# Patient Record
Sex: MALE | Race: WHITE | NOT HISPANIC OR LATINO | Employment: OTHER | URBAN - METROPOLITAN AREA
[De-identification: names, ages, dates, MRNs, and addresses within clinical notes are randomized per-mention and may not be internally consistent; named-entity substitution may affect disease eponyms.]

---

## 2018-05-21 ENCOUNTER — OFFICE VISIT CONVERTED (OUTPATIENT)
Dept: CARDIOLOGY | Facility: CLINIC | Age: 73
End: 2018-05-21
Attending: INTERNAL MEDICINE

## 2018-05-21 ENCOUNTER — CONVERSION ENCOUNTER (OUTPATIENT)
Dept: CARDIOLOGY | Facility: CLINIC | Age: 73
End: 2018-05-21

## 2018-10-04 ENCOUNTER — OFFICE VISIT CONVERTED (OUTPATIENT)
Dept: UROLOGY | Facility: CLINIC | Age: 73
End: 2018-10-04
Attending: UROLOGY

## 2018-11-26 ENCOUNTER — OFFICE VISIT CONVERTED (OUTPATIENT)
Dept: CARDIOLOGY | Facility: CLINIC | Age: 73
End: 2018-11-26
Attending: INTERNAL MEDICINE

## 2018-11-26 ENCOUNTER — CONVERSION ENCOUNTER (OUTPATIENT)
Dept: OTHER | Facility: HOSPITAL | Age: 73
End: 2018-11-26

## 2019-04-09 ENCOUNTER — HOSPITAL ENCOUNTER (OUTPATIENT)
Dept: SURGERY | Facility: HOSPITAL | Age: 74
Setting detail: HOSPITAL OUTPATIENT SURGERY
Discharge: HOME | End: 2019-04-09
Attending: UROLOGY

## 2019-06-01 ENCOUNTER — HOSPITAL ENCOUNTER (OUTPATIENT)
Dept: OTHER | Facility: HOSPITAL | Age: 74
Discharge: HOME | End: 2019-06-01
Attending: INTERNAL MEDICINE

## 2019-06-01 LAB
ALBUMIN SERPL-MCNC: 4.3 G/DL (ref 3.5–5)
ALBUMIN/GLOB SERPL: 1.5 {RATIO} (ref 1.4–2.6)
ALP SERPL-CCNC: 79 U/L (ref 56–155)
ALT SERPL-CCNC: 18 U/L (ref 10–40)
ANION GAP SERPL CALC-SCNC: 17 MMOL/L (ref 8–19)
AST SERPL-CCNC: 15 U/L (ref 15–50)
BILIRUB SERPL-MCNC: 0.81 MG/DL (ref 0.2–1.3)
BUN SERPL-MCNC: 18 MG/DL (ref 5–25)
BUN/CREAT SERPL: 16 {RATIO} (ref 6–20)
CALCIUM SERPL-MCNC: 9.2 MG/DL (ref 8.7–10.4)
CHLORIDE SERPL-SCNC: 105 MMOL/L (ref 99–111)
CHOLEST SERPL-MCNC: 136 MG/DL (ref 107–200)
CHOLEST/HDLC SERPL: 4.1 {RATIO} (ref 3–6)
CONV CO2: 27 MMOL/L (ref 22–32)
CONV TOTAL PROTEIN: 7.1 G/DL (ref 6.3–8.2)
CREAT UR-MCNC: 1.1 MG/DL (ref 0.7–1.2)
GFR SERPLBLD BASED ON 1.73 SQ M-ARVRAT: >60 ML/MIN/{1.73_M2}
GLOBULIN UR ELPH-MCNC: 2.8 G/DL (ref 2–3.5)
GLUCOSE SERPL-MCNC: 107 MG/DL (ref 70–99)
HDLC SERPL-MCNC: 33 MG/DL (ref 40–60)
LDLC SERPL CALC-MCNC: 82 MG/DL (ref 70–100)
OSMOLALITY SERPL CALC.SUM OF ELEC: 302 MOSM/KG (ref 273–304)
POTASSIUM SERPL-SCNC: 4.4 MMOL/L (ref 3.5–5.3)
SODIUM SERPL-SCNC: 145 MMOL/L (ref 135–147)
TRIGL SERPL-MCNC: 104 MG/DL (ref 40–150)
VLDLC SERPL-MCNC: 21 MG/DL (ref 5–37)

## 2019-06-18 ENCOUNTER — OFFICE VISIT CONVERTED (OUTPATIENT)
Dept: CARDIOLOGY | Facility: CLINIC | Age: 74
End: 2019-06-18
Attending: NURSE PRACTITIONER

## 2019-08-09 ENCOUNTER — HOSPITAL ENCOUNTER (OUTPATIENT)
Dept: OTHER | Facility: HOSPITAL | Age: 74
Discharge: HOME | End: 2019-08-09
Attending: DERMATOLOGY

## 2019-08-09 LAB
ALBUMIN SERPL-MCNC: 4.3 G/DL (ref 3.5–5)
ALP SERPL-CCNC: 79 U/L (ref 56–155)
ALT SERPL-CCNC: 17 U/L (ref 10–40)
AST SERPL-CCNC: 15 U/L (ref 15–50)
BASOPHILS # BLD AUTO: 0.07 10*3/UL (ref 0–0.2)
BASOPHILS NFR BLD AUTO: 1.2 % (ref 0–3)
BILIRUB SERPL-MCNC: 0.47 MG/DL (ref 0.2–1.3)
CONV ABS IMM GRAN: 0.01 10*3/UL (ref 0–0.2)
CONV BILI, CONJUGATED: <0.2 MG/DL (ref 0–0.6)
CONV IMMATURE GRAN: 0.2 % (ref 0–1.8)
CONV TOTAL PROTEIN: 6.7 G/DL (ref 6.3–8.2)
CONV UNCONJUGATED BILIRUBIN: 0.3 MG/DL (ref 0–1.1)
DEPRECATED RDW RBC AUTO: 41.2 FL (ref 35.1–43.9)
EOSINOPHIL # BLD AUTO: 0.25 10*3/UL (ref 0–0.7)
EOSINOPHIL # BLD AUTO: 4.4 % (ref 0–7)
ERYTHROCYTE [DISTWIDTH] IN BLOOD BY AUTOMATED COUNT: 13.2 % (ref 11.6–14.4)
HCT VFR BLD AUTO: 41.6 % (ref 42–52)
HGB BLD-MCNC: 13.9 G/DL (ref 14–18)
LYMPHOCYTES # BLD AUTO: 1.6 10*3/UL (ref 1–5)
LYMPHOCYTES NFR BLD AUTO: 28.1 % (ref 20–45)
MCH RBC QN AUTO: 28.9 PG (ref 27–31)
MCHC RBC AUTO-ENTMCNC: 33.4 G/DL (ref 33–37)
MCV RBC AUTO: 86.5 FL (ref 80–96)
MONOCYTES # BLD AUTO: 0.78 10*3/UL (ref 0.2–1.2)
MONOCYTES NFR BLD AUTO: 13.7 % (ref 3–10)
NEUTROPHILS # BLD AUTO: 2.98 10*3/UL (ref 2–8)
NEUTROPHILS NFR BLD AUTO: 52.4 % (ref 30–85)
NRBC CBCN: 0 % (ref 0–0.7)
PLATELET # BLD AUTO: 267 10*3/UL (ref 130–400)
PMV BLD AUTO: 9.9 FL (ref 9.4–12.4)
RBC # BLD AUTO: 4.81 10*6/UL (ref 4.7–6.1)
WBC # BLD AUTO: 5.69 10*3/UL (ref 4.8–10.8)

## 2019-08-14 LAB
CONV QUANTIFERON TB GOLD: NEGATIVE
QUANTIFERON CRITERIA: NORMAL
QUANTIFERON MITOGEN VALUE: >10 IU/ML
QUANTIFERON NIL VALUE: 0.03 IU/ML
QUANTIFERON TB1 AG VALUE: 0.03 IU/ML
QUANTIFERON TB2 AG VALUE: 0.02 IU/ML

## 2019-09-17 ENCOUNTER — HOSPITAL ENCOUNTER (OUTPATIENT)
Dept: OTHER | Facility: HOSPITAL | Age: 74
Discharge: HOME | End: 2019-09-17
Attending: NURSE PRACTITIONER

## 2019-09-17 LAB
ALBUMIN SERPL-MCNC: 4.5 G/DL (ref 3.5–5)
ALBUMIN/GLOB SERPL: 1.7 {RATIO} (ref 1.4–2.6)
ALP SERPL-CCNC: 90 U/L (ref 56–155)
ALT SERPL-CCNC: 39 U/L (ref 10–40)
ANION GAP SERPL CALC-SCNC: 15 MMOL/L (ref 8–19)
AST SERPL-CCNC: 27 U/L (ref 15–50)
BILIRUB SERPL-MCNC: 0.74 MG/DL (ref 0.2–1.3)
BUN SERPL-MCNC: 14 MG/DL (ref 5–25)
BUN/CREAT SERPL: 15 {RATIO} (ref 6–20)
CALCIUM SERPL-MCNC: 9.4 MG/DL (ref 8.7–10.4)
CHLORIDE SERPL-SCNC: 104 MMOL/L (ref 99–111)
CHOLEST SERPL-MCNC: 132 MG/DL (ref 107–200)
CHOLEST/HDLC SERPL: 3.8 {RATIO} (ref 3–6)
CONV CO2: 29 MMOL/L (ref 22–32)
CONV TOTAL PROTEIN: 7.1 G/DL (ref 6.3–8.2)
CREAT UR-MCNC: 0.94 MG/DL (ref 0.7–1.2)
GFR SERPLBLD BASED ON 1.73 SQ M-ARVRAT: >60 ML/MIN/{1.73_M2}
GLOBULIN UR ELPH-MCNC: 2.6 G/DL (ref 2–3.5)
GLUCOSE SERPL-MCNC: 102 MG/DL (ref 70–99)
HDLC SERPL-MCNC: 35 MG/DL (ref 40–60)
LDLC SERPL CALC-MCNC: 77 MG/DL (ref 70–100)
OSMOLALITY SERPL CALC.SUM OF ELEC: 299 MOSM/KG (ref 273–304)
POTASSIUM SERPL-SCNC: 3.9 MMOL/L (ref 3.5–5.3)
SODIUM SERPL-SCNC: 144 MMOL/L (ref 135–147)
TRIGL SERPL-MCNC: 99 MG/DL (ref 40–150)
VLDLC SERPL-MCNC: 20 MG/DL (ref 5–37)

## 2019-10-17 ENCOUNTER — OFFICE VISIT CONVERTED (OUTPATIENT)
Dept: UROLOGY | Facility: CLINIC | Age: 74
End: 2019-10-17
Attending: UROLOGY

## 2019-10-17 ENCOUNTER — CONVERSION ENCOUNTER (OUTPATIENT)
Dept: UROLOGY | Facility: CLINIC | Age: 74
End: 2019-10-17

## 2019-12-09 ENCOUNTER — HOSPITAL ENCOUNTER (OUTPATIENT)
Dept: OTHER | Facility: HOSPITAL | Age: 74
Discharge: HOME | End: 2019-12-09
Attending: NURSE PRACTITIONER

## 2019-12-09 LAB
ALBUMIN SERPL-MCNC: 4.3 G/DL (ref 3.5–5)
ALBUMIN/GLOB SERPL: 1.3 {RATIO} (ref 1.4–2.6)
ALP SERPL-CCNC: 102 U/L (ref 56–155)
ALT SERPL-CCNC: 25 U/L (ref 10–40)
ANION GAP SERPL CALC-SCNC: 20 MMOL/L (ref 8–19)
AST SERPL-CCNC: 17 U/L (ref 15–50)
BILIRUB SERPL-MCNC: 0.7 MG/DL (ref 0.2–1.3)
BUN SERPL-MCNC: 22 MG/DL (ref 5–25)
BUN/CREAT SERPL: 22 {RATIO} (ref 6–20)
CALCIUM SERPL-MCNC: 9.9 MG/DL (ref 8.7–10.4)
CHLORIDE SERPL-SCNC: 103 MMOL/L (ref 99–111)
CHOLEST SERPL-MCNC: 136 MG/DL (ref 107–200)
CHOLEST/HDLC SERPL: 3.7 {RATIO} (ref 3–6)
CONV CO2: 25 MMOL/L (ref 22–32)
CONV TOTAL PROTEIN: 7.5 G/DL (ref 6.3–8.2)
CREAT UR-MCNC: 1.02 MG/DL (ref 0.7–1.2)
GFR SERPLBLD BASED ON 1.73 SQ M-ARVRAT: >60 ML/MIN/{1.73_M2}
GLOBULIN UR ELPH-MCNC: 3.2 G/DL (ref 2–3.5)
GLUCOSE SERPL-MCNC: 104 MG/DL (ref 70–99)
HDLC SERPL-MCNC: 37 MG/DL (ref 40–60)
LDLC SERPL CALC-MCNC: 77 MG/DL (ref 70–100)
OSMOLALITY SERPL CALC.SUM OF ELEC: 302 MOSM/KG (ref 273–304)
POTASSIUM SERPL-SCNC: 3.7 MMOL/L (ref 3.5–5.3)
SODIUM SERPL-SCNC: 144 MMOL/L (ref 135–147)
TRIGL SERPL-MCNC: 112 MG/DL (ref 40–150)
VLDLC SERPL-MCNC: 22 MG/DL (ref 5–37)

## 2019-12-17 ENCOUNTER — OFFICE VISIT CONVERTED (OUTPATIENT)
Dept: CARDIOLOGY | Facility: CLINIC | Age: 74
End: 2019-12-17
Attending: NURSE PRACTITIONER

## 2020-06-09 ENCOUNTER — HOSPITAL ENCOUNTER (OUTPATIENT)
Dept: OTHER | Facility: HOSPITAL | Age: 75
Discharge: HOME | End: 2020-06-09
Attending: NURSE PRACTITIONER

## 2020-06-09 LAB
ALBUMIN SERPL-MCNC: 4.3 G/DL (ref 3.5–5)
ALBUMIN/GLOB SERPL: 1.6 {RATIO} (ref 1.4–2.6)
ALP SERPL-CCNC: 80 U/L (ref 56–155)
ALT SERPL-CCNC: 22 U/L (ref 10–40)
ANION GAP SERPL CALC-SCNC: 16 MMOL/L (ref 8–19)
AST SERPL-CCNC: 15 U/L (ref 15–50)
BILIRUB SERPL-MCNC: 0.66 MG/DL (ref 0.2–1.3)
BUN SERPL-MCNC: 22 MG/DL (ref 5–25)
BUN/CREAT SERPL: 22 {RATIO} (ref 6–20)
CALCIUM SERPL-MCNC: 9.1 MG/DL (ref 8.7–10.4)
CHLORIDE SERPL-SCNC: 103 MMOL/L (ref 99–111)
CHOLEST SERPL-MCNC: 121 MG/DL (ref 107–200)
CHOLEST/HDLC SERPL: 3.9 {RATIO} (ref 3–6)
CK SERPL-CCNC: 60 U/L (ref 57–374)
CONV CO2: 24 MMOL/L (ref 22–32)
CONV TOTAL PROTEIN: 7 G/DL (ref 6.3–8.2)
CREAT UR-MCNC: 1.01 MG/DL (ref 0.7–1.2)
GFR SERPLBLD BASED ON 1.73 SQ M-ARVRAT: >60 ML/MIN/{1.73_M2}
GLOBULIN UR ELPH-MCNC: 2.7 G/DL (ref 2–3.5)
GLUCOSE SERPL-MCNC: 112 MG/DL (ref 70–99)
HDLC SERPL-MCNC: 31 MG/DL (ref 40–60)
LDLC SERPL CALC-MCNC: 69 MG/DL (ref 70–100)
OSMOLALITY SERPL CALC.SUM OF ELEC: 292 MOSM/KG (ref 273–304)
POTASSIUM SERPL-SCNC: 4 MMOL/L (ref 3.5–5.3)
SODIUM SERPL-SCNC: 139 MMOL/L (ref 135–147)
TRIGL SERPL-MCNC: 105 MG/DL (ref 40–150)
VLDLC SERPL-MCNC: 21 MG/DL (ref 5–37)

## 2020-06-16 ENCOUNTER — OFFICE VISIT CONVERTED (OUTPATIENT)
Dept: CARDIOLOGY | Facility: CLINIC | Age: 75
End: 2020-06-16
Attending: NURSE PRACTITIONER

## 2020-07-16 ENCOUNTER — OFFICE VISIT CONVERTED (OUTPATIENT)
Dept: UROLOGY | Facility: CLINIC | Age: 75
End: 2020-07-16
Attending: UROLOGY

## 2020-10-07 ENCOUNTER — HOSPITAL ENCOUNTER (OUTPATIENT)
Dept: OTHER | Facility: HOSPITAL | Age: 75
Discharge: HOME | End: 2020-10-07
Attending: NURSE PRACTITIONER

## 2020-10-07 LAB
ALBUMIN SERPL-MCNC: 4.2 G/DL (ref 3.5–5)
ALBUMIN/GLOB SERPL: 1.5 {RATIO} (ref 1.4–2.6)
ALP SERPL-CCNC: 79 U/L (ref 56–155)
ALT SERPL-CCNC: 22 U/L (ref 10–40)
ANION GAP SERPL CALC-SCNC: 20 MMOL/L (ref 8–19)
AST SERPL-CCNC: 20 U/L (ref 15–50)
BASOPHILS # BLD AUTO: 0.1 10*3/UL (ref 0–0.2)
BASOPHILS NFR BLD AUTO: 1.6 % (ref 0–3)
BILIRUB SERPL-MCNC: 0.42 MG/DL (ref 0.2–1.3)
BILIRUB SERPL-MCNC: 0.45 MG/DL (ref 0.2–1.3)
BUN SERPL-MCNC: 20 MG/DL (ref 5–25)
BUN/CREAT SERPL: 18 {RATIO} (ref 6–20)
CALCIUM SERPL-MCNC: 9.2 MG/DL (ref 8.7–10.4)
CHLORIDE SERPL-SCNC: 103 MMOL/L (ref 99–111)
CHOLEST SERPL-MCNC: 126 MG/DL (ref 107–200)
CHOLEST/HDLC SERPL: 3.9 {RATIO} (ref 3–6)
CONV ABS IMM GRAN: 0.02 10*3/UL (ref 0–0.2)
CONV BILI, CONJUGATED: <0.2 MG/DL (ref 0–0.6)
CONV CO2: 23 MMOL/L (ref 22–32)
CONV IMMATURE GRAN: 0.3 % (ref 0–1.8)
CONV TOTAL PROTEIN: 7 G/DL (ref 6.3–8.2)
CONV UNCONJUGATED BILIRUBIN: 0.3 MG/DL (ref 0–1.1)
CREAT UR-MCNC: 1.09 MG/DL (ref 0.7–1.2)
DEPRECATED RDW RBC AUTO: 43.3 FL (ref 35.1–43.9)
EOSINOPHIL # BLD AUTO: 0.37 10*3/UL (ref 0–0.7)
EOSINOPHIL # BLD AUTO: 5.7 % (ref 0–7)
ERYTHROCYTE [DISTWIDTH] IN BLOOD BY AUTOMATED COUNT: 13.2 % (ref 11.6–14.4)
GFR SERPLBLD BASED ON 1.73 SQ M-ARVRAT: >60 ML/MIN/{1.73_M2}
GLOBULIN UR ELPH-MCNC: 2.8 G/DL (ref 2–3.5)
GLUCOSE SERPL-MCNC: 110 MG/DL (ref 70–99)
HCT VFR BLD AUTO: 43.1 % (ref 42–52)
HDLC SERPL-MCNC: 32 MG/DL (ref 40–60)
HGB BLD-MCNC: 13.8 G/DL (ref 14–18)
LDLC SERPL CALC-MCNC: 70 MG/DL (ref 70–100)
LYMPHOCYTES # BLD AUTO: 1.72 10*3/UL (ref 1–5)
LYMPHOCYTES NFR BLD AUTO: 26.7 % (ref 20–45)
MCH RBC QN AUTO: 28.8 PG (ref 27–31)
MCHC RBC AUTO-ENTMCNC: 32 G/DL (ref 33–37)
MCV RBC AUTO: 89.8 FL (ref 80–96)
MONOCYTES # BLD AUTO: 0.86 10*3/UL (ref 0.2–1.2)
MONOCYTES NFR BLD AUTO: 13.4 % (ref 3–10)
NEUTROPHILS # BLD AUTO: 3.37 10*3/UL (ref 2–8)
NEUTROPHILS NFR BLD AUTO: 52.3 % (ref 30–85)
NRBC CBCN: 0 % (ref 0–0.7)
OSMOLALITY SERPL CALC.SUM OF ELEC: 297 MOSM/KG (ref 273–304)
PLATELET # BLD AUTO: 297 10*3/UL (ref 130–400)
PMV BLD AUTO: 10.3 FL (ref 9.4–12.4)
POTASSIUM SERPL-SCNC: 4.2 MMOL/L (ref 3.5–5.3)
RBC # BLD AUTO: 4.8 10*6/UL (ref 4.7–6.1)
SODIUM SERPL-SCNC: 142 MMOL/L (ref 135–147)
TRIGL SERPL-MCNC: 120 MG/DL (ref 40–150)
VLDLC SERPL-MCNC: 24 MG/DL (ref 5–37)
WBC # BLD AUTO: 6.44 10*3/UL (ref 4.8–10.8)

## 2020-10-10 LAB
CONV QUANTIFERON TB GOLD: NEGATIVE
QUANTIFERON CRITERIA: NORMAL
QUANTIFERON MITOGEN VALUE: >10 IU/ML
QUANTIFERON NIL VALUE: 0.03 IU/ML
QUANTIFERON TB1 AG VALUE: 0.03 IU/ML
QUANTIFERON TB2 AG VALUE: 0.03 IU/ML

## 2021-01-12 ENCOUNTER — CONVERSION ENCOUNTER (OUTPATIENT)
Dept: CARDIOLOGY | Facility: CLINIC | Age: 76
End: 2021-01-12

## 2021-01-12 ENCOUNTER — OFFICE VISIT CONVERTED (OUTPATIENT)
Dept: CARDIOLOGY | Facility: CLINIC | Age: 76
End: 2021-01-12
Attending: NURSE PRACTITIONER

## 2021-04-17 ENCOUNTER — HOSPITAL ENCOUNTER (OUTPATIENT)
Dept: OTHER | Facility: HOSPITAL | Age: 76
Discharge: HOME | End: 2021-04-17
Attending: NURSE PRACTITIONER

## 2021-04-17 LAB
ALBUMIN SERPL-MCNC: 4.3 G/DL (ref 3.5–5)
ALBUMIN/GLOB SERPL: 1.4 {RATIO} (ref 1.4–2.6)
ALP SERPL-CCNC: 80 U/L (ref 56–155)
ALT SERPL-CCNC: 22 U/L (ref 10–40)
ANION GAP SERPL CALC-SCNC: 12 MMOL/L (ref 8–19)
AST SERPL-CCNC: 19 U/L (ref 15–50)
BILIRUB SERPL-MCNC: 0.54 MG/DL (ref 0.2–1.3)
BUN SERPL-MCNC: 19 MG/DL (ref 5–25)
BUN/CREAT SERPL: 19 {RATIO} (ref 6–20)
CALCIUM SERPL-MCNC: 9.4 MG/DL (ref 8.7–10.4)
CHLORIDE SERPL-SCNC: 104 MMOL/L (ref 99–111)
CHOLEST SERPL-MCNC: 121 MG/DL (ref 107–200)
CHOLEST/HDLC SERPL: 3.9 {RATIO} (ref 3–6)
CONV CO2: 28 MMOL/L (ref 22–32)
CONV TOTAL PROTEIN: 7.3 G/DL (ref 6.3–8.2)
CREAT UR-MCNC: 1.02 MG/DL (ref 0.7–1.2)
GFR SERPLBLD BASED ON 1.73 SQ M-ARVRAT: >60 ML/MIN/{1.73_M2}
GLOBULIN UR ELPH-MCNC: 3 G/DL (ref 2–3.5)
GLUCOSE SERPL-MCNC: 103 MG/DL (ref 70–99)
HDLC SERPL-MCNC: 31 MG/DL (ref 40–60)
LDLC SERPL CALC-MCNC: 62 MG/DL (ref 70–100)
OSMOLALITY SERPL CALC.SUM OF ELEC: 293 MOSM/KG (ref 273–304)
POTASSIUM SERPL-SCNC: 4.2 MMOL/L (ref 3.5–5.3)
SODIUM SERPL-SCNC: 140 MMOL/L (ref 135–147)
TRIGL SERPL-MCNC: 142 MG/DL (ref 40–150)
VLDLC SERPL-MCNC: 28 MG/DL (ref 5–37)

## 2021-05-13 NOTE — PROGRESS NOTES
Progress Note      Patient Name: Deacon Neil   Patient ID: [id number]   Sex: Male   YOB: 1945    Primary Care Provider: Ghazala DIAS   Referring Provider: Ghazala DIAS    Visit Date: June 16, 2020    Provider: ARUN Blue   Location: [facility]   Location Address: [address] [place] [address], [facility]   [place]  [id number]   Location Phone: [phone]          Chief Complaint  · Muscle aches       History Of Present Illness  REFERRING PROVIDER: Ghazala DIAS   Deacon Neil is a 75 year old male who continues to complain of muscle aches described as cramps, particularly in his right leg. He denies any chest pain or pressure. No palpitations, shortness of breath, swelling, dizziness, syncope, PND or orthopnea. He admits he is not getting much exercise, and he has gained 7 pounds since his last visit.   PAST MEDICAL HISTORY: (1) Coronary artery disease. Cardiac catheterization on 06/21/2013 showed 80-85% stenosis of the mid LAD, 70-75% lesion of the diagonal 1 branch, and 40% stenosis of the proximal left circumflex artery. Medical management was advised at that point. (2) Hyperlipidemia. (3) Hypertension. (4) Psoriasis.   FAMILY HISTORY: Positive for hypertension. Negative for diabetes or heart disease.   PSYCHOSOCIAL HISTORY: No history of mood changes or depression. He drinks a moderate amount of alcohol.   CURRENT MEDICATIONS: include Cosentyx every 4 weeks; ciclopirox shampoo; Clobetasol solution; Clobetasol cream; Desonide cream; Ketoconazole shampoo; econazole cream; desloratadine 5 mg daily; OTC guaifenesin p.r.n; acetaminophen p.r.n.; CoQ10; aspirin 81 mg daily; Atorvastatin 40 mg daily; Zetia 10 mg daily; Valsartan//12.5 mg 1/2 tablet in the morning; Metoprolol ER 25 mg daily. The dosage and frequency of the medications were reviewed with the patient.       Review of Systems  · Cardiovascular  o Denies  o :  "palpitations (fast, fluttering, or skipping beats), swelling (feet, ankles, hands), shortness of breath while walking or lying flat, chest pain or angina pectoris   · Respiratory  o Denies  o : chronic or frequent cough, asthma or wheezing      Vitals  Date Time BP Position Site L\R Cuff Size HR RR TEMP (F) WT  HT  BMI kg/m2 BSA m2 O2 Sat HC       06/16/2020 10:22 /70 Sitting    72 - R   224lbs 16oz 6'  2\" 28.89 2.31     06/16/2020 10:22 /66 Sitting                     Physical Examination  · Constitutional  o Appearance  o : Awake, alert, in no acute distress.  · Eyes  o Conjunctivae  o : Conjunctivae normal.  · Ears, Nose, Mouth and Throat  o Oral Cavity  o :   § Oral Mucosa  § : Normal.  · Neck  o Jugular Veins  o : No JVD. Good carotid upstroke. No bruits noted.  · Respiratory  o Respiratory  o : Good respiratory effort. Clear to percussion and auscultation.  · Cardiovascular  o Heart  o : Regular rate and rhythm. No murmur, rubs, or gallops. No jugular venous distention.   o Peripheral Vascular System  o :   § Extremities  § : Warm and well perfused. Distal pulses present. No pitting pedal edema.   · Gastrointestinal  o Abdominal Examination  o : Soft. No tenderness or masses felt. No hepatosplenomegaly. Abdominal aorta is not palpable.     Labs:  Blood sugar 112, , , HDL 31, LDL 69.    Home blood pressure log was reviewed.               Assessment     1.  Myalgias related to Atorvastatin as well as other statins.  2.  Coronary artery disease, without angina.  3.  Hypertension, with white-coat syndrome, with good control at home.       Plan     1.  Instructed him to hold the Atorvastatin for 2 weeks and call to tell me how he is doing.    2.  He is also instructed to do another blood pressure log prior to his next appointment.  3.  Follow up in 6 months with labs or p.r.n.    Sarahy moreno/juarez           This note was transcribed by Taylor Kaur.  juarez/josh  The above service was " transcribed by Taylor Kaur, and I attest to the accuracy of the note.  JF                 Electronically Signed by: Taylor Kaur-, -Author on July 2, 2020 08:16:00 AM  Electronically Co-signed by: ARUN Blue -Reviewer on July 2, 2020 08:50:02 AM

## 2021-05-13 NOTE — PROGRESS NOTES
"   Progress Note      Patient Name: Deacon Neil   Patient ID: [id number]   Sex: Male   YOB: 1945    Primary Care Provider: Ghazala DIAS   Referring Provider: Ghazala DIAS    Visit Date: July 16, 2020    Provider: Crispin Zendejas MD   Location[facility]   Location Address: [address] [place] [address], [address] [place] [address]  [id number]   Location Phone: [phone]          Chief Complaint  · \"Here for a prostate check\"      History Of Present Illness  The patient is a 75 year old /White male , who presents to follow up on bph. Patient was diagnosed with fibroepithelial bladder tumor in 2009. He has been doing very good. No hematuria or pressure in the urinary bladder. His stream is good and no burning           Past Medical History  Bladder tumor; BPH (benign prostatic hyperplasia); Psoriasis         Past Surgical History  Cataract surgery; Circumcision; Cystoscopy; Tonsillectomy; Vasectomy         Medication List  aspirin 81 mg oral tablet,delayed release (DR/EC); clobetasol 0.05 % topical solution; clobetasol 0.05 % topical cream; co-10 100 mg; Cosentyx 150 mg/mL subcutaneous syringe; desloratadine 5 mg oral tablet; desonide 0.05 % topical lotion; econazole 1 % topical cream; ezetimibe 10 mg oral tablet; ketoconazole 2 % topical shampoo; metoprolol succinate 25 mg oral tablet extended release 24 hr; rosuvastatin 10 mg oral tablet; Tylenol Extra Strength 500 mg oral tablet; valsartan-hydrochlorothiazide 320-12.5 mg oral tablet         Allergy List  Pseudoephedrine Cold/Allergy         Family Medical History  Hypertension         Social History  Alcohol (Current some day); Tobacco (Former)         Review of Systems  · Constitutional  o Denies  o : fever, headache, chills  · Eyes  o Denies  o : eye pain, double vision, blurred vision  · HENT  o Denies  o : sinus problems, sore throat, ear infection  · Respiratory  o Admits  o : cough  o Denies  o : " "shortness of breath, wheezing, frequent cough  · Gastrointestinal  o Denies  o : nausea, vomiting, heartburn, indigestion, abdominal pain  · Genitourinary  o Denies  o : urgency, frequency, urinary retention, painful urination  · Integument  o Denies  o : rash, itching, boils  · Neurologic  o Denies  o : tingling or numbness, tremors, dizzy spells  · Musculoskeletal  o Denies  o : joint pain, neck pain, back pain  · Endocrine  o Denies  o : cold intolerance, heat intolerance, tired, excessive thirst, sluggish  · Psychiatric  o Admits  o : feels satisfied with life  o Denies  o : severe depression, concerns with hurting themselves  · Heme-Lymph  o Denies  o : swollen glands, blood clotting problems  · Allergic-Immunologic  o Denies  o : sinus allergy symptoms, hay fever      Vitals  Date Time BP Position Site L\R Cuff Size HR RR TEMP (F) WT  HT  BMI kg/m2 BSA m2 O2 Sat HC       07/16/2020 02:17 /63 Sitting    79 - R  97.3 224lbs 16oz 6'  2\" 28.89 2.31           Physical Examination  · Constitutional  o Appearance  o : Well nourished, well developed patient in no acute distress. Ambulating without difficulty.  · Neck  o Thyroid  o : Normal size without tenderness, nodules or masses  · Respiratory  o Respiratory Effort  o : Breathing is unlabored without accessory muscle use  o Inspection of Chest  o : normal appearance, no retractions  o Auscultation of Lungs  o : Normal breath sounds  · Cardiovascular  o Heart  o :   § Auscultation of Heart  § : regular rate and rhythm, no murmurs, gallops or rubs  o Peripheral Vascular System  o : No abnormalities  · Gastrointestinal  o Abdominal Examination  o : abdomen nontender to palpation, normal bowel sounds, tone normal without rigidity or guarding, no masses present, abdomen obese upon supine  o Liver and spleen  o : No hepatomegaly present. Liver is non-tender to palpation and spleen is not palpable.  o Hernias  o : No abdominal wall hernias are " present.  · Genitourinary  o Bladder  o : no abnormalities  o Penis  o : Normal appearance without lesion, discharge or masses.  o Urethral Meatus  o : no abnormalities  o Scrotum and Scrotal Contents  o :   § Scrotum  § : no abnormalities  § Epididymides  § : no abnormalities  § Testes  § : no abnormalities  o Digital Rectal Examination  o :   § Prostate  § : Not done at patient request  · Lymphatic  o Neck  o : No lymphadenopathy present  o Groin  o : No lymphadenopathy present  · Skin and Subcutaneous Tissue  o General Inspection  o : No rashes, lesions or areas of discoloration present. Skin turgor is normal.  o General Palpation  o : No abnormalities, masses or tenderness on palpation.  · Neurologic  o Mental Status Examination  o : grossly oriented to person, place and time  o Gait and Station  o : normal gait, able to stand without difficulty  · Psychiatric  o Mood and Affect  o : mood normal, affect appropriate      Figure 1.0: Pain Rating Scale-Zap         Results  · In-Office Procedures  o Lab procedure  § Automated dipstick urinalysis with microscopy (78177)   § Color Ur: Yellow   § Clarity Ur: Clear   § Glucose Ur Ql Strip: Negative   § Bilirub Ur Ql Strip: Negative   § Ketones Ur Ql Strip: Negative   § Sp Gr Ur Qn: 1.025   § Hgb Ur Ql Strip: Negative   § pH Ur-LsCnc: 5.5   § Prot Ur Ql Strip: Negative   § Urobilinogen Ur Strip-mCnc: 0.2 E.U./dL   § Nitrite Ur Ql Strip: Negative   § WBC Est Ur Ql Strip: Negative   § RBC UrnS Qn HPF: 0   § WBC UrnS Qn HPF: 0   § Bacteria UrnS Qn HPF: 0   § Crystals UrnS Qn HPF: 0   § Epithelial Cells (non renal): 0 /HPF      Assessment  · BPH (benign prostatic hyperplasia)     600.00/N40.0  · 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

## 2021-05-14 VITALS
BODY MASS INDEX: 29.26 KG/M2 | HEIGHT: 74 IN | SYSTOLIC BLOOD PRESSURE: 160 MMHG | DIASTOLIC BLOOD PRESSURE: 70 MMHG | HEART RATE: 72 BPM | WEIGHT: 228 LBS

## 2021-05-14 NOTE — PROGRESS NOTES
Progress Note      Patient Name: Deacon Neil   Patient ID: [id number]   Sex: Male   YOB: 1945    Primary Care Provider: Ghazala DIAS   Referring Provider: Ghazala DIAS    Visit Date: January 12, 2021    Provider: ARUN Blue   Location: [facility]   Location Address: [address] [place] [address], [facility]   [place], [place]  [id number]   Location Phone: [phone]          Chief Complaint     Hip and muscle pain.       History Of Present Illness  REFERRING PROVIDER: Ghazala DIAS   Deacon Neil is a 75 year old /White male who states his hip and muscle pain have resolved when he stopped his atorvastatin, but it gradually came back again when he started rosuvastatin. He denies any chest pain. No palpitations, shortness of breath, swelling, dizziness, syncope, PND, or orthopnea. He has gained about 3 pounds since his last visit. He states his blood pressures are good at home, but he did not bring his readings.   PAST MEDICAL HISTORY: 1) Coronary artery disease. Cardiac catheterization on 06/21/2013 showed 80-85% stenosis of the mid LAD, 70-75% lesion of the diagonal 1 branch, and 40% stenosis of the proximal left circumflex artery. Medical management was advised at that point; 2) Hyperlipidemia; 3) Hypertension; 4) Psoriasis.   PSYCHOSOCIAL HISTORY: Previously smoked, but quit in 2013. Moderate alcohol consumption.   CURRENT MEDICATIONS: Cosentyx 300 mg injection q. 4 weeks; econazole nitrate cream 1% b.i.d.; desloratadine 5 mg daily; guaifenesin 400 mg q. 4 hours; acetaminophen 500 mg b.i.d.; Co-Q10 100 mg daily; aspirin 81 mg daily; rosuvastatin 10 mg daily; ezetimibe 10 mg daily; valsartan-hydrochlorothiazide 320-12.5 mg q. a.m.; metoprolol succinate ER 25 mg q. p.m.      ALLERGIES:  Lisinopril causes cough; pseudoephedrine cold/allergy.       Review of Systems  · Cardiovascular  o Denies  o : palpitations (fast, fluttering, or skipping  "beats), swelling (feet, ankles, hands), shortness of breath while walking or lying flat, chest pain or angina pectoris   · Respiratory  o Admits  o : chronic or frequent cough      Vitals  Date Time BP Position Site L\R Cuff Size HR RR TEMP (F) WT  HT  BMI kg/m2 BSA m2 O2 Sat FR L/min FiO2 HC       01/12/2021 10:02 /70 Sitting    72 - R   228lbs 0oz 6'  2\" 29.27 2.32       01/12/2021 10:02 /64 Sitting                       Physical Examination  · Constitutional  o Appearance  o : Awake, alert, in no acute distress, somewhat hard-of-hearing, walks stiffly.  · Eyes  o Conjunctivae  o : Conjunctivae normal.  · Ears, Nose, Mouth and Throat  o Oral Cavity  o :   § Oral Mucosa  § : Normal.  · Neck  o Jugular Veins  o : No JVD. Good carotid upstroke. No bruits noted.  · Respiratory  o Respiratory  o : Good respiratory effort. Clear to percussion and auscultation.  · Cardiovascular  o Heart  o : Regular rate and rhythm. No murmur, rubs, or gallops.   o Peripheral Vascular System  o :   § Extremities  § : Warm and well perfused. Distal pulses present. No pitting pedal edema.   · Gastrointestinal  o Abdominal Examination  o : Soft. No tenderness or masses felt. No hepatosplenomegaly. Abdominal aorta is not palpable.   · Labs  o Labs  o : In October, on rosuvastatin and Zetia combination, total cholesterol 126, triglycerides 120, HDL 32, LDL good at 70.           Assessment     1.  Hypertension, uncertain control.  2.  Hyperlipidemia, at goal.  3.  Myalgias with rosuvastatin and Zetia combo.  4.  Coronary artery disease without angina.       Plan     1.  He will do a blood pressure log, and we will adjust hypertensive medications if needed.    2.  Decrease rosuvastatin to 10 mg every other day to help with his aches and pains.  3.  He can increase his Co-Q10 to 200 mg a day.  4.  Continue Zetia for cholesterol.  5.  Continue valsartan-hydrochlorothiazide and metoprolol at this time for coronary artery disease, as " well as        hypertension, until we see the blood pressure log.    6.  Encouraged him to get the COVID vaccine when it becomes available, and he has every intention to do so.    7.  Follow up in 6 months or earlier if needed.      ARUN Lyons  JF:vm             Electronically Signed by: Amanda Aguilar-, Other -Author on January 23, 2021 07:29:05 AM  Electronically Co-signed by: ARUN Blue -Reviewer on January 26, 2021 11:40:43 AM

## 2021-05-15 VITALS
WEIGHT: 213 LBS | BODY MASS INDEX: 27.34 KG/M2 | HEIGHT: 74 IN | SYSTOLIC BLOOD PRESSURE: 161 MMHG | HEART RATE: 71 BPM | TEMPERATURE: 98.6 F | DIASTOLIC BLOOD PRESSURE: 93 MMHG

## 2021-05-15 VITALS
BODY MASS INDEX: 27.98 KG/M2 | WEIGHT: 218 LBS | SYSTOLIC BLOOD PRESSURE: 154 MMHG | HEART RATE: 74 BPM | HEIGHT: 74 IN | DIASTOLIC BLOOD PRESSURE: 68 MMHG

## 2021-05-15 VITALS
SYSTOLIC BLOOD PRESSURE: 132 MMHG | BODY MASS INDEX: 28.88 KG/M2 | HEART RATE: 79 BPM | WEIGHT: 225 LBS | HEIGHT: 74 IN | TEMPERATURE: 97.3 F | DIASTOLIC BLOOD PRESSURE: 63 MMHG

## 2021-05-15 VITALS
HEART RATE: 72 BPM | WEIGHT: 225 LBS | DIASTOLIC BLOOD PRESSURE: 70 MMHG | BODY MASS INDEX: 28.88 KG/M2 | HEIGHT: 74 IN | SYSTOLIC BLOOD PRESSURE: 148 MMHG

## 2021-05-15 VITALS
DIASTOLIC BLOOD PRESSURE: 72 MMHG | HEIGHT: 74 IN | BODY MASS INDEX: 27.46 KG/M2 | WEIGHT: 214 LBS | HEART RATE: 70 BPM | SYSTOLIC BLOOD PRESSURE: 130 MMHG

## 2021-05-16 VITALS
SYSTOLIC BLOOD PRESSURE: 152 MMHG | DIASTOLIC BLOOD PRESSURE: 80 MMHG | HEART RATE: 68 BPM | HEIGHT: 74 IN | WEIGHT: 217 LBS | BODY MASS INDEX: 27.85 KG/M2

## 2021-05-16 VITALS
WEIGHT: 217 LBS | BODY MASS INDEX: 27.85 KG/M2 | TEMPERATURE: 98.6 F | DIASTOLIC BLOOD PRESSURE: 71 MMHG | HEART RATE: 77 BPM | SYSTOLIC BLOOD PRESSURE: 146 MMHG | HEIGHT: 74 IN

## 2021-05-16 VITALS
SYSTOLIC BLOOD PRESSURE: 142 MMHG | BODY MASS INDEX: 27.85 KG/M2 | HEIGHT: 74 IN | DIASTOLIC BLOOD PRESSURE: 68 MMHG | HEART RATE: 74 BPM | WEIGHT: 217 LBS

## 2021-06-12 RX ORDER — ASPIRIN 81 MG/1
TABLET, COATED ORAL
Qty: 90 TABLET | Refills: 1 | Status: SHIPPED | OUTPATIENT
Start: 2021-06-12 | End: 2021-12-13

## 2021-06-17 RX ORDER — EZETIMIBE 10 MG/1
TABLET ORAL
COMMUNITY
Start: 2020-12-18 | End: 2021-06-17 | Stop reason: SDUPTHER

## 2021-06-17 RX ORDER — EZETIMIBE 10 MG/1
10 TABLET ORAL DAILY
Qty: 90 TABLET | Refills: 1 | Status: SHIPPED | OUTPATIENT
Start: 2021-06-17 | End: 2022-04-04

## 2021-07-15 RX ORDER — ACETAMINOPHEN 500 MG
1000 TABLET ORAL EVERY 6 HOURS PRN
COMMUNITY
End: 2022-03-19 | Stop reason: HOSPADM

## 2021-07-15 RX ORDER — DESONIDE 0.5 MG/ML
LOTION TOPICAL
COMMUNITY
End: 2022-11-15 | Stop reason: SDUPTHER

## 2021-07-15 RX ORDER — SECUKINUMAB 150 MG/ML
1 INJECTION SUBCUTANEOUS
COMMUNITY
End: 2021-11-29

## 2021-07-15 RX ORDER — KETOCONAZOLE 20 MG/ML
SHAMPOO TOPICAL
COMMUNITY
End: 2021-07-21

## 2021-07-15 RX ORDER — ROSUVASTATIN CALCIUM 10 MG/1
TABLET, COATED ORAL
COMMUNITY
Start: 2020-12-18 | End: 2021-07-21 | Stop reason: SDUPTHER

## 2021-07-15 RX ORDER — METOPROLOL SUCCINATE 25 MG/1
25 TABLET, EXTENDED RELEASE ORAL NIGHTLY
COMMUNITY
Start: 2021-04-05 | End: 2022-06-29

## 2021-07-15 RX ORDER — DESLORATADINE 5 MG/1
5 TABLET ORAL NIGHTLY
COMMUNITY
Start: 2021-06-11 | End: 2022-06-27 | Stop reason: SDUPTHER

## 2021-07-15 RX ORDER — APREMILAST 30 MG/1
TABLET, FILM COATED ORAL
COMMUNITY
End: 2021-07-21

## 2021-07-15 RX ORDER — ADALIMUMAB 40MG/0.8ML
KIT SUBCUTANEOUS
COMMUNITY
End: 2021-07-21

## 2021-07-15 RX ORDER — CLOBETASOL PROPIONATE 0.46 MG/ML
SOLUTION TOPICAL
COMMUNITY
End: 2021-07-21

## 2021-07-15 RX ORDER — CLOBETASOL PROPIONATE 0.5 MG/G
CREAM TOPICAL
COMMUNITY
End: 2021-11-29

## 2021-07-15 RX ORDER — VALSARTAN AND HYDROCHLOROTHIAZIDE 320; 12.5 MG/1; MG/1
0.5 TABLET, FILM COATED ORAL DAILY
COMMUNITY
Start: 2021-05-11 | End: 2022-02-09 | Stop reason: SDUPTHER

## 2021-07-19 ENCOUNTER — TELEPHONE (OUTPATIENT)
Dept: CARDIOLOGY | Facility: CLINIC | Age: 76
End: 2021-07-19

## 2021-07-19 PROBLEM — L40.9 PSORIASIS: Status: ACTIVE | Noted: 2021-07-19

## 2021-07-19 PROBLEM — N40.0 BPH (BENIGN PROSTATIC HYPERPLASIA): Status: ACTIVE | Noted: 2021-07-19

## 2021-07-19 PROBLEM — D49.4 BLADDER TUMOR: Status: ACTIVE | Noted: 2021-07-19

## 2021-07-20 PROBLEM — I10 HYPERTENSION, ESSENTIAL: Status: ACTIVE | Noted: 2021-07-20

## 2021-07-20 PROBLEM — I25.10 CORONARY ARTERY DISEASE INVOLVING NATIVE HEART WITHOUT ANGINA PECTORIS: Status: ACTIVE | Noted: 2021-07-20

## 2021-07-20 PROBLEM — E78.5 HYPERLIPIDEMIA: Status: ACTIVE | Noted: 2021-07-20

## 2021-07-21 ENCOUNTER — OFFICE VISIT (OUTPATIENT)
Dept: CARDIOLOGY | Facility: CLINIC | Age: 76
End: 2021-07-21

## 2021-07-21 ENCOUNTER — OFFICE VISIT (OUTPATIENT)
Dept: UROLOGY | Facility: CLINIC | Age: 76
End: 2021-07-21

## 2021-07-21 VITALS
DIASTOLIC BLOOD PRESSURE: 51 MMHG | HEART RATE: 84 BPM | SYSTOLIC BLOOD PRESSURE: 120 MMHG | TEMPERATURE: 97.5 F | HEIGHT: 74 IN | BODY MASS INDEX: 28.36 KG/M2 | WEIGHT: 221 LBS

## 2021-07-21 VITALS
WEIGHT: 222 LBS | BODY MASS INDEX: 28.49 KG/M2 | HEART RATE: 74 BPM | DIASTOLIC BLOOD PRESSURE: 56 MMHG | SYSTOLIC BLOOD PRESSURE: 138 MMHG | HEIGHT: 74 IN

## 2021-07-21 DIAGNOSIS — N40.0 BENIGN PROSTATIC HYPERPLASIA, UNSPECIFIED WHETHER LOWER URINARY TRACT SYMPTOMS PRESENT: ICD-10-CM

## 2021-07-21 DIAGNOSIS — E78.5 HYPERLIPIDEMIA, UNSPECIFIED HYPERLIPIDEMIA TYPE: ICD-10-CM

## 2021-07-21 DIAGNOSIS — I25.10 CORONARY ARTERY DISEASE INVOLVING NATIVE HEART WITHOUT ANGINA PECTORIS, UNSPECIFIED VESSEL OR LESION TYPE: Primary | ICD-10-CM

## 2021-07-21 DIAGNOSIS — I10 HYPERTENSION, ESSENTIAL: ICD-10-CM

## 2021-07-21 DIAGNOSIS — D49.4 BLADDER TUMOR: Primary | ICD-10-CM

## 2021-07-21 LAB
BILIRUB BLD-MCNC: NEGATIVE MG/DL
CLARITY, POC: CLEAR
COLOR UR: YELLOW
GLUCOSE UR STRIP-MCNC: NEGATIVE MG/DL
KETONES UR QL: NEGATIVE
LEUKOCYTE EST, POC: NEGATIVE
NITRITE UR-MCNC: NEGATIVE MG/ML
PH UR: 5.5 [PH] (ref 5–8)
PROT UR STRIP-MCNC: NEGATIVE MG/DL
RBC # UR STRIP: NEGATIVE /UL
SP GR UR: 1.02 (ref 1–1.03)
UROBILINOGEN UR QL: NORMAL

## 2021-07-21 PROCEDURE — 99212 OFFICE O/P EST SF 10 MIN: CPT | Performed by: UROLOGY

## 2021-07-21 PROCEDURE — 99214 OFFICE O/P EST MOD 30 MIN: CPT | Performed by: NURSE PRACTITIONER

## 2021-07-21 PROCEDURE — 81003 URINALYSIS AUTO W/O SCOPE: CPT | Performed by: UROLOGY

## 2021-07-21 RX ORDER — ROSUVASTATIN CALCIUM 10 MG/1
TABLET, COATED ORAL
Qty: 45 TABLET | Refills: 3 | Status: SHIPPED | OUTPATIENT
Start: 2021-07-21 | End: 2022-05-26 | Stop reason: DRUGHIGH

## 2021-07-21 RX ORDER — SECUKINUMAB 150 MG/ML
300 INJECTION SUBCUTANEOUS
COMMUNITY
Start: 2021-07-08

## 2021-07-21 NOTE — PROGRESS NOTES
Chief Complaint  Follow-up, Hypertension, and Coronary Artery Disease    Subjective            Deacon Neil presents to [facility]   He Is a 76-year-old white male who comes in Denies any chest pains, shortness of breath, palpitations, dizziness, syncope, swelling, PND, or orthopnea.  Cardiac wise he has no complaints.  Continues to monitor his blood pressures at home and now is doing good.  He has lost 6 pound since his last visit.  He is back both of his Covid vaccines.              Past History:    Past Medical History:   Diagnosis Date   • Bladder tumor     MYOFIBROBLASTIC TUMOR   • BPH (benign prostatic hyperplasia)    • Coronary artery disease involving native heart without angina pectoris 6/21/2013     Coronary artery disease. Cardiac catheterization on 06/21/2013 showed 80-85% stenosis of the mid LAD, 70-75% lesion of the diagonal 1 branch, and 40% stenosis of the proximal left circumflex artery. Medical management was advised at that point;    • Hyperlipidemia    • Hypertension, essential    • Psoriasis         Family History: family history includes Hypertension in his mother.     Social History: reports that he has quit smoking. He has never used smokeless tobacco. He reports current alcohol use. He reports that he does not use drugs.    Allergies: Pseudoephedrine and Lisinopril      Past Surgical History:   Procedure Laterality Date   • CATARACT EXTRACTION     • CIRCUMCISION     • CYSTOSCOPY     • TONSILLECTOMY     • VASECTOMY          Prior to Admission medications    Medication Sig Start Date End Date Taking? Authorizing Provider   acetaminophen (TYLENOL) 500 MG tablet Tylenol Extra Strength 500 mg oral tablet take 2 tablets (1,000 mg) by oral route every 6 hours as needed   Active   Yes Provider, MD Jamar   Aspirin Low Dose 81 MG EC tablet TAKE 1 TABLET BY MOUTH EVERY DAY 6/12/21  Yes Haily Rooney, ARUN           Cosentyx Sensoready, 300 MG, 150 MG/ML  solution auto-injector  7/8/21  Yes Jamar Frankel MD   desloratadine (CLARINEX) 5 MG tablet  6/11/21  Yes ProviderJamar MD   econazole nitrate (SPECTAZOLE) 1 % cream econazole 1 % topical cream apply to the affected and surrounding areas of skin by topical route once daily   Active   Yes Jamar Frankel MD   ezetimibe (ZETIA) 10 MG tablet Take 1 tablet by mouth Daily. 6/17/21  Yes Haily Rooney APRN   metoprolol succinate XL (TOPROL-XL) 25 MG 24 hr tablet metoprolol succinate 25 mg oral tablet extended release 24 hr TAKE 1 TABLET BY MOUTH EVERY DAY 4/5/2021  Active 4/5/21  Yes Jamar Frankel MD   rosuvastatin (CRESTOR) 10 MG tablet rosuvastatin 10 mg oral tablet take 1 tablet (10 mg) by oral route every other day 12/18/20  Yes ProviderJamar MD   valsartan-hydrochlorothiazide (DIOVAN-HCT) 320-12.5 MG per tablet Take 0.5 tablets by mouth Daily. 5/11/21  Yes ProviderJamar MD                           clobetasol (TEMOVATE) 0.05 % external solution clobetasol 0.05 % scalp solution apply to affected area by topical route daily   Active    ProviderJamar MD   desonide (DESOWEN) 0.05 % lotion desonide 0.05 % topical lotion apply sparingly and rub gently into the affected area(s) by topical route 2 times per day   Active    ProviderJamar MD                   Secukinumab (Cosentyx) 150 MG/ML solution prefilled syringe 1 mL.    Provider, MD Jamar        Review of Systems   Respiratory: Positive for cough (Mild and states this is a side effect of Cosentyx). Negative for chest tightness and shortness of breath.    Musculoskeletal: Positive for arthralgias.   All other systems reviewed and are negative.       Objective     Physical Exam  Constitutional:       Appearance: Normal appearance. He is normal weight.   Eyes:      Pupils: Pupils are equal, round, and reactive to light.   Neck:      Vascular: No carotid bruit.   Cardiovascular:      Rate and Rhythm:  "Normal rate and regular rhythm.      Heart sounds: No murmur heard.     Pulmonary:      Effort: Pulmonary effort is normal.      Breath sounds: Normal breath sounds.   Abdominal:      General: Abdomen is flat. Bowel sounds are normal.      Palpations: Abdomen is soft.   Neurological:      Mental Status: He is alert.       /56   Pulse 74   Ht 188 cm (74\")   Wt 101 kg (222 lb)   BMI 28.50 kg/m²       Vitals:    07/21/21 0957   BP: 138/56   Pulse: 74       Result Review :         The following data was reviewed by: ARUN Turner on 07/21/2021:      No results found for: PROBNP, BNP  CMP    CMP 10/7/20 10/7/20 4/17/21    0927 0927    Glucose 110 (A)  103 (A)   BUN 20  19   Creatinine 1.09  1.02   Sodium 142  140   Potassium 4.2  4.2   Chloride 103  104   Calcium 9.2  9.4   Albumin 4.2  4.3   Total Bilirubin 0.42 0.45 0.54   Alkaline Phosphatase 79  80   AST (SGOT) 20  19   ALT (SGPT) 22  22   (A) Abnormal value            CBC w/diff    CBC w/Diff 10/7/20   WBC 6.44   RBC 4.80   Hemoglobin 13.8 (A)   Hematocrit 43.1   MCV 89.8   MCH 28.8   MCHC 32.0 (A)   RDW 13.2   Platelets 297   Neutrophil Rel % 52.3   Lymphocyte Rel % 26.7   Monocyte Rel % 13.4 (A)   Eosinophil Rel % 5.7   Basophil Rel % 1.6   (A) Abnormal value             Lipid Panel    Lipid Panel 10/7/20 4/17/21   Total Cholesterol 126 121   Triglycerides 120 142   HDL Cholesterol 32 (A) 31 (A)   VLDL Cholesterol 24 28   LDL Cholesterol  70 62 (A)   (A) Abnormal value       Comments are available for some flowsheets but are not being displayed.                          Assessment and Plan        Diagnoses and all orders for this visit:    1. Coronary artery disease involving native heart without angina pectoris, unspecified vessel or lesion type (Primary)  Assessment & Plan:  Without angina.  Continue aspirin      2. Hyperlipidemia, unspecified hyperlipidemia type  Assessment & Plan:  LDL at goal.  Continue Zetia daily and rosuvastatin 10 mg " every other day.    Orders:  -     Lipid Panel; Future  -     Comprehensive Metabolic Panel; Future  -     rosuvastatin (CRESTOR) 10 MG tablet; Take 1 tab every other day.  Dispense: 45 tablet; Refill: 3    3. Hypertension, essential  Assessment & Plan:  Controlled.  Continue valsartan HCT and metoprolol.    Orders:  -     Comprehensive Metabolic Panel; Future  -     Magnesium; Future            Follow Up     Return in about 9 months (around 4/21/2022) for with Dr. Albert.    Patient was given instructions and counseling regarding his condition or for health maintenance advice. Please see specific information pulled into the AVS if appropriate.       ARUN Lyons  07/21/21 10:03 EDT

## 2021-07-21 NOTE — PROGRESS NOTES
"Chief Complaint  Follow-up (1 year) for BPH and fibroepithelial bladder tumor    Subjective  Patient is doing fine        Deacon Neil presents to [facility] UROLOGY  History of Present Illness.    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  He has been doing fine since that time.  He does have some urgency but not too bad and patient wants to manage it without any medicine.  He has no dysuria or gross hematuria.  Urinating fine.  Good stream.  He has no nocturia.  Patient has pain in the right hip radiating to right leg which is becoming uncomfortable.    Objective   Vital Signs:   /51 (BP Location: Right arm, Patient Position: Sitting, Cuff Size: Adult)   Pulse 84   Temp 97.5 °F (36.4 °C)   Ht 188 cm (74\")   Wt 100 kg (221 lb)   BMI 28.37 kg/m²     Allergies   Allergen Reactions   • Pseudoephedrine Anaphylaxis   • Lisinopril Cough      Review of Systems   Constitutional: Negative.    HENT: Negative.    Eyes: Negative.    Respiratory: Negative.    Cardiovascular: Negative.    Gastrointestinal: Negative.    Endocrine: Negative.    Genitourinary: Negative.    Musculoskeletal: Positive for back pain, gait problem and myalgias.   Skin: Positive for rash.        On the scrotum which seem like depigmentation of the scrotal skin like leukoderma   Allergic/Immunologic: Negative.    Neurological: Negative for dizziness and facial asymmetry.   Hematological: Negative.    Psychiatric/Behavioral: Negative.  Negative for agitation, behavioral problems, confusion, decreased concentration, dysphoric mood, hallucinations, self-injury, sleep disturbance and suicidal ideas. The patient is not nervous/anxious and is not hyperactive.    All other systems reviewed and are negative.       Physical Exam  Constitutional:       Appearance: Normal appearance. He is obese.   HENT:      Head: Normocephalic and atraumatic.      Nose: Nose normal.   Neck:      Vascular: " No carotid bruit.   Cardiovascular:      Rate and Rhythm: Normal rate and regular rhythm.      Pulses: Normal pulses.      Heart sounds: Normal heart sounds. No murmur heard.     Pulmonary:      Effort: Pulmonary effort is normal. No respiratory distress.      Breath sounds: Normal breath sounds. No wheezing, rhonchi or rales.   Abdominal:      General: There is no distension.      Palpations: Abdomen is soft. There is no mass.      Tenderness: There is no right CVA tenderness or left CVA tenderness.   Genitourinary:     Penis: Normal.       Testes: Normal.      Prostate: Normal.      Rectum: Normal.      Comments: Lesions on both sides of scrotum patient looks like leukoderma.  Is going to see Dr. Quiñones in August  Musculoskeletal:         General: No swelling or tenderness.      Cervical back: Normal range of motion and neck supple. No rigidity or tenderness.   Lymphadenopathy:      Cervical: No cervical adenopathy.   Skin:     General: Skin is warm.   Neurological:      General: No focal deficit present.      Mental Status: He is alert and oriented to person, place, and time.   Psychiatric:         Mood and Affect: Mood normal.         Behavior: Behavior normal.         Thought Content: Thought content normal.         Judgment: Judgment normal.        Result Review :                 Assessment and Plan    Diagnoses and all orders for this visit:    1. Bladder tumor (Primary)    2. Benign prostatic hyperplasia, unspecified whether lower urinary tract symptoms present  -     POC Urinalysis Dipstick, Automated      Will recheck the patient in 1 year's time    Follow Up   No follow-ups on file.  Patient was given instructions and counseling regarding his condition or for health maintenance advice. Please see specific information pulled into the AVS if appropriate.     Crispin Zendejas MD

## 2021-08-06 ENCOUNTER — TELEPHONE (OUTPATIENT)
Dept: NEUROLOGY | Facility: OTHER | Age: 76
End: 2021-08-06

## 2021-08-06 NOTE — TELEPHONE ENCOUNTER
Caller: Deacon Neil    Relationship: Self    Best call back number: [phone]    What was the call regarding: PT CALLED STATING THAT HIS UROLOGIST, DR. GUTIERREZ, WAS REFERRING HIM TO [facility]. INFORMED PT THAT I WAS WITH NEUROLOGY SIDE OF THE OFFICE, HOWEVER, I DID NOT SEE ANY INDICATION IN HIS CHART THAT HIS UROLOGIST HAD PLACED THE INTERNAL REFERRAL TO NEUROSURGERY. I ADVISED PT TO CONTACT DR. GUTIERREZ'S OFFICE AND REQUEST THAT INTERNAL REFERRAL BE PLACED AND NEUROSURGERY AGENTS WOULD GATHER THE INFORMATION THAT THEY NEED IN ORDER TO GET HIM SCHEDULED. PT VERBALIZED UNDERSTANDING AND STATED HE CONTACT DR. GUTIERREZ'S OFFICE ONCE OUR CALL ENDED.    DOCUMENTING PER [facility] PROTOCOL.

## 2021-08-18 ENCOUNTER — OFFICE VISIT (OUTPATIENT)
Dept: FAMILY MEDICINE CLINIC | Facility: CLINIC | Age: 76
End: 2021-08-18

## 2021-08-18 ENCOUNTER — HOSPITAL ENCOUNTER (OUTPATIENT)
Dept: GENERAL RADIOLOGY | Facility: HOSPITAL | Age: 76
Discharge: HOME | End: 2021-08-18

## 2021-08-18 VITALS
DIASTOLIC BLOOD PRESSURE: 68 MMHG | SYSTOLIC BLOOD PRESSURE: 138 MMHG | RESPIRATION RATE: 20 BRPM | BODY MASS INDEX: 28.81 KG/M2 | OXYGEN SATURATION: 96 % | TEMPERATURE: 98.6 F | WEIGHT: 224.5 LBS | HEIGHT: 74 IN | HEART RATE: 86 BPM

## 2021-08-18 DIAGNOSIS — M54.50 BILATERAL LOW BACK PAIN WITHOUT SCIATICA, UNSPECIFIED CHRONICITY: ICD-10-CM

## 2021-08-18 DIAGNOSIS — M54.10 RADICULAR PAIN OF BOTH LOWER EXTREMITIES: ICD-10-CM

## 2021-08-18 DIAGNOSIS — M54.50 BILATERAL LOW BACK PAIN WITHOUT SCIATICA, UNSPECIFIED CHRONICITY: Primary | ICD-10-CM

## 2021-08-18 PROCEDURE — 99203 OFFICE O/P NEW LOW 30 MIN: CPT | Performed by: NURSE PRACTITIONER

## 2021-08-18 PROCEDURE — 73521 X-RAY EXAM HIPS BI 2 VIEWS: CPT

## 2021-08-18 PROCEDURE — 72114 X-RAY EXAM L-S SPINE BENDING: CPT

## 2021-08-18 NOTE — PROGRESS NOTES
Chief Complaint  Leg Pain (right leg pain )    Subjective          Deacon Neil presents to [facility]  History of Present Illness    Patient has been a patient here previously though it has been over 3 years role.  Patient started with right leg pain several years ago when he was placed on statins by cardiology.  Each time he tried a different statin he started having the leg pain.  He is currently on Crestor by cardiology but at a very low dose.  He does have to hold onto things for support on that right side it was both legs but it is mainly in the right leg.  He has weakness shooting pains at times to the knees.  He was having knee pain but the CBD oil that he was trying did help and that took the pressure and pain of his knees away.  He said he has had an MRI at the [place] several years ago.  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  He is not having any numbness or tingling noted.  No previous back surgeries or x-rays recently.  No bowel or bladder leaking noted.  He sees Dr. Zendejas and he tried to do a referral to the neuro spine but they told him that it has to come from primary and that is why is here today for referral for neuro spine.    Past Medical History:   • Bladder tumor    MYOFIBROBLASTIC TUMOR   • BPH (benign prostatic hyperplasia)   • Coronary artery disease involving native heart without angina pectoris     Coronary artery disease. Cardiac catheterization on 06/21/2013 showed 80-85% stenosis of the mid LAD, 70-75% lesion of the diagonal 1 branch, and 40% stenosis of the proximal left circumflex artery. Medical management was advised at that point;    • Hyperlipidemia   • Hypertension, essential   • Psoriasis       Allergies  Pseudoephedrine and Lisinopril    Past Surgical History:   • CATARACT EXTRACTION   • CIRCUMCISION   • CYSTOSCOPY   • TONSILLECTOMY   • VASECTOMY       Social History     Tobacco Use   • Smoking status: Former Smoker      Quit date: 2014     Years since quittin.2   • Smokeless tobacco: Never Used   • Tobacco comment: STARTED AGE 15 QUIT AGE 67   Vaping Use   • Vaping Use: Never used   Substance Use Topics   • Alcohol use: Yes     Comment: OCCASIONALLY   • Drug use: Never       Family History   Problem Relation Age of Onset   • Hypertension Mother         Health Maintenance Due   Topic Date Due   • ANNUAL PHYSICAL  Never done   • TDAP/TD VACCINES (1 - Tdap) Never done   • ZOSTER VACCINE (1 of 2) Never done   • Pneumococcal Vaccine 65+ (1 of 1 - PPSV23) Never done   • HEPATITIS C SCREENING  Never done          Current Outpatient Medications:   •  acetaminophen (TYLENOL) 500 MG tablet, Tylenol Extra Strength 500 mg oral tablet take 2 tablets (1,000 mg) by oral route every 6 hours as needed   Active, Disp: , Rfl:   •  Aspirin Low Dose 81 MG EC tablet, TAKE 1 TABLET BY MOUTH EVERY DAY, Disp: 90 tablet, Rfl: 1  •  Cosentyx Sensoready, 300 MG, 150 MG/ML solution auto-injector, , Disp: , Rfl:   •  desloratadine (CLARINEX) 5 MG tablet, , Disp: , Rfl:   •  desonide (DESOWEN) 0.05 % lotion, desonide 0.05 % topical lotion apply sparingly and rub gently into the affected area(s) by topical route 2 times per day   Active, Disp: , Rfl:   •  econazole nitrate (SPECTAZOLE) 1 % cream, econazole 1 % topical cream apply to the affected and surrounding areas of skin by topical route once daily   Active, Disp: , Rfl:   •  ezetimibe (ZETIA) 10 MG tablet, Take 1 tablet by mouth Daily., Disp: 90 tablet, Rfl: 1  •  metoprolol succinate XL (TOPROL-XL) 25 MG 24 hr tablet, metoprolol succinate 25 mg oral tablet extended release 24 hr TAKE 1 TABLET BY MOUTH EVERY DAY 2021  Active, Disp: , Rfl:   •  rosuvastatin (CRESTOR) 10 MG tablet, Take 1 tab every other day., Disp: 45 tablet, Rfl: 3  •  Secukinumab (Cosentyx) 150 MG/ML solution prefilled syringe, 1 mL., Disp: , Rfl:   •  valsartan-hydrochlorothiazide (DIOVAN-HCT) 320-12.5 MG per tablet, Take 0.5  tablets by mouth Daily., Disp: , Rfl:   •  clobetasol (TEMOVATE) 0.05 % cream, clobetasol 0.05 % topical cream apply a thin layer to the affected area(s) by topical route 2 times per day   Active, Disp: , Rfl:     There are no discontinued medications.      There is no immunization history on file for this patient.    Review of Systems   Constitutional: Negative for appetite change and fever.   Musculoskeletal: Positive for arthralgias, back pain and gait problem.        Objective       Vitals:    08/18/21 1055   BP: 138/68   Pulse:    Resp:    Temp:    SpO2:      Body mass index is 28.82 kg/m².         Physical Exam  Vitals reviewed.   Constitutional:       Appearance: Normal appearance. He is well-developed.   Cardiovascular:      Rate and Rhythm: Normal rate and regular rhythm.      Heart sounds: Normal heart sounds. No murmur heard.     Pulmonary:      Effort: Pulmonary effort is normal.      Breath sounds: Normal breath sounds.   Musculoskeletal:         General: No swelling or tenderness.      Lumbar back: No swelling, edema, deformity or tenderness. Normal range of motion.   Neurological:      Mental Status: He is alert and oriented to person, place, and time.      Cranial Nerves: No cranial nerve deficit.      Motor: No weakness.   Psychiatric:         Mood and Affect: Mood and affect normal.       Patient had no tenderness or pain with raising legs.  He did have issues getting up out of the chair and stabilization as walking.  He did have to hold onto the wall as he came for checkout.  He has not had any falls.      Result Review :     The following data was reviewed by: ARUN Chambers on 08/18/2021:                     Assessment and Plan      Diagnoses and all orders for this visit:    1. Bilateral low back pain without sciatica, unspecified chronicity (Primary)  -     Ambulatory Referral to Neurosurgery  -     XR Hips Bilateral With or Without Pelvis 2 View; Future  -     XR Spine Lumbar  Complete With Flex & Ext; Future    2. Radicular pain of both lower extremities  -     Ambulatory Referral to Neurosurgery  -     XR Hips Bilateral With or Without Pelvis 2 View; Future  -     XR Spine Lumbar Complete With Flex & Ext; Future        I spent 36 minutes caring for Deacon on this date of service. This time includes time spent by me in the following activities:preparing for the visit, counseling and educating the patient/family/caregiver, ordering medications, tests, or procedures and referring and communicating with other health care professionals     Follow Up     No follow-ups on file.  We will do xray of the back and hip.  We will refer to neurospine for eval since this has been going on for several years.  Discussed the need for possible walker or cane for support.  He has not had any falls and he takes his time per patient.  Patient was given instructions and counseling regarding his condition or for health maintenance advice. Please see specific information pulled into the AVS if appropriate.

## 2021-10-13 ENCOUNTER — OFFICE VISIT (OUTPATIENT)
Dept: NEUROSURGERY | Facility: CLINIC | Age: 76
End: 2021-10-13

## 2021-10-13 VITALS
HEART RATE: 66 BPM | HEIGHT: 74 IN | BODY MASS INDEX: 29 KG/M2 | DIASTOLIC BLOOD PRESSURE: 57 MMHG | SYSTOLIC BLOOD PRESSURE: 133 MMHG | WEIGHT: 226 LBS

## 2021-10-13 DIAGNOSIS — R29.898 WEAKNESS OF BOTH LOWER EXTREMITIES: ICD-10-CM

## 2021-10-13 DIAGNOSIS — M47.27 OSTEOARTHRITIS OF SPINE WITH RADICULOPATHY, LUMBOSACRAL REGION: Primary | ICD-10-CM

## 2021-10-13 PROCEDURE — 99215 OFFICE O/P EST HI 40 MIN: CPT | Performed by: NURSE PRACTITIONER

## 2021-10-13 RX ORDER — TRAMADOL HYDROCHLORIDE 50 MG/1
50 TABLET ORAL EVERY 8 HOURS PRN
Qty: 45 TABLET | Refills: 0 | Status: SHIPPED | OUTPATIENT
Start: 2021-10-13 | End: 2021-11-02 | Stop reason: SDUPTHER

## 2021-10-13 NOTE — PATIENT INSTRUCTIONS
-MRI Lumbar Spine  -Tramadol 50 mg every 8 hours as needed for pain  -Reduce Tylenol to <3000 mg daily  -Follow up in 6 weeks on Tuesday or Thursday

## 2021-10-13 NOTE — PROGRESS NOTES
"Chief Complaint  Back Pain    Subjective          Deacon Neil who is a 76 y.o. year old male who presents to [facility] for evaluation of his low back and right leg pain.     Pt with concerns of chronic back pain for many years. Over the past two to three months he has had worsened back pain with radiating pain primarily in the right leg, though now starting in the left leg as well. Pain is constant, 8/10. Pain increases with prolonged standing, walking, bending, and twisting. He will have weakness in the legs, worse on the right. Weakness with rising from a seated position. He has started using a cane for ambulation and support.     Primarily taking Tylenol XR for his pain, has been taking 1000 mg daily for years though more recently has increased this to 4000 mg daily due to increased pain. Has not had any recent physical therapy. He has never seen chiropractic. Has never seen pain management.       Recent Interventions: Tylenol      Review of Systems   Musculoskeletal: Positive for arthralgias, back pain and gait problem.   Neurological: Positive for weakness and numbness.   All other systems reviewed and are negative.       Objective   Vital Signs:   /57   Pulse 66   Ht 188 cm (74\")   Wt 103 kg (226 lb)   BMI 29.02 kg/m²       Physical Exam  Vitals reviewed.   Constitutional:       Appearance: Normal appearance.   Musculoskeletal:      Lumbar back: No tenderness. Positive right straight leg raise test. Negative left straight leg raise test.      Right hip: Tenderness present. Decreased range of motion.      Left hip: No tenderness. Normal range of motion.   Neurological:      Mental Status: He is alert and oriented to person, place, and time.      Deep Tendon Reflexes:      Reflex Scores:       Patellar reflexes are 0 on the right side and 0 on the left side.       Achilles reflexes are 0 on the right side and 0 on the left side.       Neurologic Exam "     Mental Status   Oriented to person, place, and time.   Level of consciousness: alert    Motor Exam   Muscle bulk: normal  Overall muscle tone: normal    Strength   Strength 5/5 except as noted.   Right peroneal: 4/5  Left peroneal: 4/5  Right gastroc: 4/5  Left gastroc: 4/5    Sensory Exam   Sensory deficit distribution on right: L4  Sensory deficit distribution on left: L4    Gait, Coordination, and Reflexes     Gait  Gait: wide-based    Reflexes   Right patellar: 0  Left patellar: 0  Right achilles: 0  Left achilles: 0Antalgic gait with forward flexion at the waist.        Result Review :       Data reviewed: Radiologic studies XR Lumbar Spine showing degenerative changes with disc space narrowing at several levels, most significant at L4/5.        XR Hips shows degenerative changes worse on the right side.     Assessment and Plan    Diagnoses and all orders for this visit:    1. Osteoarthritis of spine with radiculopathy, lumbosacral region (Primary)  -     MRI Lumbar Spine Without Contrast; Future  -     traMADol (ULTRAM) 50 MG tablet; Take 1 tablet by mouth Every 8 (Eight) Hours As Needed for Moderate Pain  or Severe Pain .  Dispense: 45 tablet; Refill: 0    2. Weakness of both lower extremities  -     MRI Lumbar Spine Without Contrast; Future    Pt with chronic low back pain, recently worsened with radiating pain and weakness in the legs worse on the right side. XR Lumbar Spine showing degenerative changes with DDD. Exam shows hyporeflexia, weakness with DF and PF and sensory deficit in L4 distribution. Will proceed with MRI Lumbar Spine to evaluate for canal stenosis contributing to neurogenic claudication. His pain is rather severe, he is taking too much Tylenol. Unable to take NSAIDs. Will start Tramadol 50 mg every 8 hours as needed. Bhanu reviewed. He will follow up in 6 weeks to review MRI for consideration of surgical intervention.     I spent 45 minutes caring for Deacon on this date of service.  This time includes time spent by me in the following activities:preparing for the visit, reviewing tests, obtaining and/or reviewing a separately obtained history, performing a medically appropriate examination and/or evaluation , counseling and educating the patient/family/caregiver, ordering medications, tests, or procedures, documenting information in the medical record and independently interpreting results and communicating that information with the patient/family/caregiver.    Follow Up   Return in about 6 weeks (around 11/24/2021) for Review imaging.  Patient was given instructions and counseling regarding his condition or for health maintenance advice.     -MRI Lumbar Spine  -Tramadol 50 mg every 8 hours as needed for pain  -Reduce Tylenol to <3000 mg daily  -Follow up in 6 weeks on Tuesday or Thursday

## 2021-11-02 DIAGNOSIS — M47.27 OSTEOARTHRITIS OF SPINE WITH RADICULOPATHY, LUMBOSACRAL REGION: ICD-10-CM

## 2021-11-02 NOTE — TELEPHONE ENCOUNTER
Caller: Deacon Neil    Relationship: Self    Requested Prescriptions:   Requested Prescriptions     Pending Prescriptions Disp Refills   • traMADol (ULTRAM) 50 MG tablet 45 tablet 0     Sig: Take 1 tablet by mouth Every 8 (Eight) Hours As Needed for Moderate Pain  or Severe Pain .        Pharmacy where request should be sent: [id number] - [place], [place] - [address] AT [facility] - [phone]  - [phone] FX  [phone]    Additional details provided by patient: PT REQUESTED A REFILL SINCE HE HAD TO RESCHED HIS APPT TO 11/29 AND FEELS HE WONT HAVE ENOUGH TO GET HIM THROUGH UNTIL THEN     PLEASE CALL THE PT IF YOU HAVE ANY QUESTIONS THANK YOU     Best call back number: [phone]    Does the patient have less than a 3 day supply:  [] Yes  [x] No    Bobby Ansari Rep   11/02/21 09:15 EDT

## 2021-11-03 ENCOUNTER — HOSPITAL ENCOUNTER (OUTPATIENT)
Dept: MRI IMAGING | Facility: HOSPITAL | Age: 76
Discharge: HOME | End: 2021-11-03
Admitting: NURSE PRACTITIONER

## 2021-11-03 DIAGNOSIS — M47.27 OSTEOARTHRITIS OF SPINE WITH RADICULOPATHY, LUMBOSACRAL REGION: ICD-10-CM

## 2021-11-03 DIAGNOSIS — R29.898 WEAKNESS OF BOTH LOWER EXTREMITIES: ICD-10-CM

## 2021-11-03 PROCEDURE — 72148 MRI LUMBAR SPINE W/O DYE: CPT

## 2021-11-03 RX ORDER — TRAMADOL HYDROCHLORIDE 50 MG/1
50 TABLET ORAL EVERY 8 HOURS PRN
Qty: 45 TABLET | Refills: 0 | Status: SHIPPED | OUTPATIENT
Start: 2021-11-03 | End: 2021-11-29 | Stop reason: SDUPTHER

## 2021-11-29 ENCOUNTER — HOSPITAL ENCOUNTER (OUTPATIENT)
Dept: GENERAL RADIOLOGY | Facility: HOSPITAL | Age: 76
Discharge: HOME | End: 2021-11-29
Admitting: NURSE PRACTITIONER

## 2021-11-29 ENCOUNTER — OFFICE VISIT (OUTPATIENT)
Dept: NEUROSURGERY | Facility: CLINIC | Age: 76
End: 2021-11-29

## 2021-11-29 ENCOUNTER — TELEPHONE (OUTPATIENT)
Dept: NEUROSURGERY | Facility: CLINIC | Age: 76
End: 2021-11-29

## 2021-11-29 VITALS
DIASTOLIC BLOOD PRESSURE: 60 MMHG | BODY MASS INDEX: 27.98 KG/M2 | HEIGHT: 74 IN | SYSTOLIC BLOOD PRESSURE: 130 MMHG | WEIGHT: 218 LBS

## 2021-11-29 DIAGNOSIS — M47.817 SPONDYLOSIS OF LUMBOSACRAL REGION WITHOUT MYELOPATHY OR RADICULOPATHY: Primary | ICD-10-CM

## 2021-11-29 DIAGNOSIS — M25.559 HIP PAIN: ICD-10-CM

## 2021-11-29 DIAGNOSIS — M47.27 OSTEOARTHRITIS OF SPINE WITH RADICULOPATHY, LUMBOSACRAL REGION: ICD-10-CM

## 2021-11-29 DIAGNOSIS — M16.11 OSTEOARTHRITIS OF RIGHT HIP, UNSPECIFIED OSTEOARTHRITIS TYPE: ICD-10-CM

## 2021-11-29 PROCEDURE — 73522 X-RAY EXAM HIPS BI 3-4 VIEWS: CPT

## 2021-11-29 PROCEDURE — 99214 OFFICE O/P EST MOD 30 MIN: CPT | Performed by: NURSE PRACTITIONER

## 2021-11-29 RX ORDER — TRAMADOL HYDROCHLORIDE 50 MG/1
50 TABLET ORAL EVERY 8 HOURS PRN
Qty: 60 TABLET | Refills: 0 | Status: SHIPPED | OUTPATIENT
Start: 2021-11-29 | End: 2021-12-16

## 2021-11-29 NOTE — PATIENT INSTRUCTIONS
-XR Hips   -Physical therapy for low back and hip pain  -Tramadol 50 mg as needed  -Follow up in 8 weeks

## 2021-11-29 NOTE — TELEPHONE ENCOUNTER
BARTOLO WITH [facility] CALLED DUE TO MAHSA BEING IN THE PATIENT'S CHART AS SHE WAS ATTEMPTING TO GIVE HIS XRAY. THREE ATTEMPTS TO WARM TRANSFER TO PRACTICE; NO ANSWER, CALL WAS DISCONNECTED DURING 3RD ATTEMPT. DOCUMENTING PER [facility] PROTOCOL.

## 2021-11-29 NOTE — PROGRESS NOTES
"Chief Complaint  Back Pain    Subjective          Deacon Neil who is a 76 y.o. year old male who presents to [facility] NE[facility] for follow up of his low back and leg pain.     MRI Lumbar Spine on 11/3/21 personally reviewed. Multilevel spondylosis, most significant at L4/5 where this is mild anterolisthesis and osteophyte formation resulting in mild right lateral recess and foraminal narrowing. Moderate right foraminal narrowing at L2/3 and L3/4. No high grade canal stenosis.     Pain is severe, radiating into the right anterior thigh. Does not go past the knee. Pain is worse with standing, walking. Tramadol is providing benefit, though will occasionally take Tylenol in between doses.       Interval History 10/13/21  Deacon Neil who is a 76 y.o. year old male who presents to [facility] NEUROSURGERY for evaluation of his low back and right leg pain.      Pt with concerns of chronic back pain for many years. Over the past two to three months he has had worsened back pain with radiating pain primarily in the right leg, though now starting in the left leg as well. Pain is constant, 8/10. Pain increases with prolonged standing, walking, bending, and twisting. He will have weakness in the legs, worse on the right. Weakness with rising from a seated position. He has started using a cane for ambulation and support.      Primarily taking Tylenol XR for his pain, has been taking 1000 mg daily for years though more recently has increased this to 4000 mg daily due to increased pain. Has not had any recent physical therapy. He has never seen chiropractic. Has never seen pain management.      Recent Interventions: Tramadol      Review of Systems   Musculoskeletal: Positive for arthralgias, back pain, gait problem and bursitis.   All other systems reviewed and are negative.       Objective   Vital Signs:   /60   Ht 188 cm (74\")   Wt 98.9 kg (218 " lb)   BMI 27.99 kg/m²       Physical Exam  Vitals reviewed.   Constitutional:       Appearance: Normal appearance.   Musculoskeletal:      Lumbar back: Tenderness present. Negative right straight leg raise test and negative left straight leg raise test.      Right hip: Tenderness present. Decreased range of motion.      Left hip: Tenderness present. Normal range of motion.      Comments: Positive James's on the right   Neurological:      Mental Status: He is alert and oriented to person, place, and time.        Neurologic Exam     Mental Status   Oriented to person, place, and time.   Level of consciousness: alert    Gait, Coordination, and Reflexes     Gait  Gait: wide-basedAntalgic limp on the right        Result Review :       Data reviewed: Radiologic studies MRI Lumbar Spine on 11/3/21 personally reviewed. Multilevel spondylosis, most significant at L4/5 where this is mild anterolisthesis and osteophyte formation resulting in mild right lateral recess and foraminal narrowing. Moderate foraminal narrowing at L2/3 and L3/4. No high grade canal stenosis.           Assessment and Plan    Diagnoses and all orders for this visit:    1. Spondylosis of lumbosacral region without myelopathy or radiculopathy (Primary)  -     Ambulatory Referral to Physical Therapy Evaluate and treat; Heat; Moist heat; Cross Fiber; Stretching (Traction), ROM, Strengthening    2. Hip pain  -     XR Hips Bilateral With or Without Pelvis 3-4 View; Future  -     Ambulatory Referral to Physical Therapy Evaluate and treat; Heat; Moist heat; Cross Fiber; Stretching (Traction), ROM, Strengthening    3. Osteoarthritis of spine with radiculopathy, lumbosacral region  -     traMADol (ULTRAM) 50 MG tablet; Take 1 tablet by mouth Every 8 (Eight) Hours As Needed for Moderate Pain  or Severe Pain .  Dispense: 60 tablet; Refill: 0    Pt with concerns of low back and right thigh pain. We reviewed his MRI Lumbar Spine, showing multilevel spondylosis  without high grade canal or foraminal narrowing. I am not sure this would entirely explain his symptoms. He has pain in the hips, worse on the right with positive Patricks on exam. Will order XR of the hips and refer to Orthopedics pending results. Will refer to Physical therapy for low back and hip pain. Continue Tramadol for pain. Bhanu reviewed. He will follow up in 8 weeks upon completion of PT.      Follow Up   Return in about 8 weeks (around 1/24/2022).  Patient was given instructions and counseling regarding his condition or for health maintenance advice.     -XR Hips   -Physical therapy for low back and hip pain  -Tramadol 50 mg as needed  -Follow up in 8 weeks

## 2021-12-13 RX ORDER — ASPIRIN 81 MG/1
TABLET ORAL
Qty: 90 TABLET | Refills: 1 | Status: SHIPPED | OUTPATIENT
Start: 2021-12-13 | End: 2022-03-19 | Stop reason: HOSPADM

## 2021-12-16 ENCOUNTER — OFFICE VISIT (OUTPATIENT)
Dept: ORTHOPEDIC SURGERY | Facility: CLINIC | Age: 76
End: 2021-12-16

## 2021-12-16 VITALS — WEIGHT: 218 LBS | HEIGHT: 74 IN | BODY MASS INDEX: 27.98 KG/M2

## 2021-12-16 DIAGNOSIS — M16.11 PRIMARY OSTEOARTHRITIS OF RIGHT HIP: Primary | ICD-10-CM

## 2021-12-16 PROCEDURE — 99203 OFFICE O/P NEW LOW 30 MIN: CPT | Performed by: ORTHOPAEDIC SURGERY

## 2021-12-16 PROCEDURE — 96372 THER/PROPH/DIAG INJ SC/IM: CPT | Performed by: ORTHOPAEDIC SURGERY

## 2021-12-16 RX ORDER — TRAMADOL HYDROCHLORIDE 50 MG/1
50 TABLET ORAL EVERY 8 HOURS PRN
Qty: 28 TABLET | Refills: 0 | Status: SHIPPED | OUTPATIENT
Start: 2021-12-16 | End: 2022-02-08 | Stop reason: SDUPTHER

## 2021-12-16 RX ORDER — DEXAMETHASONE SODIUM PHOSPHATE 4 MG/ML
8 INJECTION, SOLUTION INTRA-ARTICULAR; INTRALESIONAL; INTRAMUSCULAR; INTRAVENOUS; SOFT TISSUE ONCE
Status: COMPLETED | OUTPATIENT
Start: 2021-12-16 | End: 2021-12-16

## 2021-12-16 RX ADMIN — DEXAMETHASONE SODIUM PHOSPHATE 8 MG: 4 INJECTION, SOLUTION INTRA-ARTICULAR; INTRALESIONAL; INTRAMUSCULAR; INTRAVENOUS; SOFT TISSUE at 09:42

## 2021-12-16 NOTE — PROGRESS NOTES
"Chief Complaint  Initial Evaluation of the Right Hip     Subjective      Deacon Neil presents to [facility] for an evaluation of right hip. Patient states he has bilateral hip pain. He states his right hip is worse than the left. Hip pain has been ongoing for several years but progressively got worse. He recently began using the cane more frequently due to hip pain. He states pain starts laterally that radiates down to his thigh. He doesn’t have much groin pain. Patient takes acetaminophen and Tramadol.     Allergies   Allergen Reactions   • Pseudoephedrine Anaphylaxis   • Lisinopril Cough        Social History     Socioeconomic History   • Marital status:    Tobacco Use   • Smoking status: Former Smoker     Quit date: 2014     Years since quittin.5   • Smokeless tobacco: Never Used   • Tobacco comment: STARTED AGE 15 QUIT AGE 67   Vaping Use   • Vaping Use: Never used   Substance and Sexual Activity   • Alcohol use: Yes     Comment: OCCASIONALLY   • Drug use: Never        Review of Systems     Objective   Vital Signs:   Ht 188 cm (74\")   Wt 98.9 kg (218 lb)   BMI 27.99 kg/m²       Physical Exam  Constitutional:       Appearance: Normal appearance. Patient is well-developed and normal weight.   HENT:      Head: Normocephalic.      Right Ear: Hearing and external ear normal.      Left Ear: Hearing and external ear normal.      Nose: Nose normal.   Eyes:      Conjunctiva/sclera: Conjunctivae normal.   Cardiovascular:      Rate and Rhythm: Normal rate.   Pulmonary:      Effort: Pulmonary effort is normal.      Breath sounds: No wheezing or rales.   Abdominal:      Palpations: Abdomen is soft.      Tenderness: There is no abdominal tenderness.   Musculoskeletal:      Cervical back: Normal range of motion.   Skin:     Findings: No rash.   Neurological:      Mental Status: Patient is alert and oriented to person, place, and time.   Psychiatric:         Mood and Affect: " Mood and affect normal.         Judgment: Judgment normal.       Ortho Exam      RIGHT HIP: Ambulation with a cane. Good tone of hip flexors, hip extensors, hip adductor, hip abductors. Tender hip and pelvic muscles. Dorsal Pedal Pulse 2+, posterior tibialis pulse 2+. Calf supple, non-tender, no signs of DVT. Limping gait. Tender greater trochanter. Good leg raise. Pain with internal and external rotation.       Procedures      Imaging Results (Most Recent)     None           Result Review :         XR Hips Bilateral With or Without Pelvis 3-4 View    Result Date: 11/29/2021  Narrative: PROCEDURE: XR HIPS BILATERAL W OR WO PELVIS 3-4 VIEW  COMPARISON: [facility], CR, XR HIPS BILATERAL W OR WO PELVIS 2 VIEW, 8/18/2021, 14:30.  INDICATIONS: CHRONIC BILATERAL HIP PAIN, NO INJURY.  FINDINGS:  There is marked joint space narrowing identified at the right hip joint with subchondral sclerosis and cyst formation.  The joint space narrowing is primarily superior.  There is mild irregularity of the articular surface.  There is moderate joint space narrowing identified in a similar fashion in the left hip.  No fractures or subluxation is identified.  CONCLUSION: Radiographic changes of osteoarthritis in both hips, right much worse than left.      Rogelio Ram MD       Electronically Signed and Approved By: oRgelio Ram MD on 11/29/2021 at 13:13                     Assessment and Plan     DX: Right hip osteoarthritis     Discussed treatment plans and diagnosis. Total hip arthroplasty discussed, he is a candidate. Patient wishes to try conservative measures. A prescription for PT given. An IM injection administered, patient tolerated this well.     Discussed surgery., Risks/benefits discussed with patient including, but not limited to: infection, bleeding, neurovascular damage, malunion, nonunion, aesthetic deformity, need for further surgery, and death., Discussed with patient the implant type being  used during surgery and patient understands and desires to proceed. and Call or return if worsening symptoms.    Follow Up     4-6 weeks.     Patient was given instructions and counseling regarding his condition or for health maintenance advice. Please see specific information pulled into the AVS if appropriate.     Scribed for Bhavesh Anderson MD by Ilda Graham.  12/16/21   09:22 EST        I have personally performed the services described in this document as scribed by the above individual and it is both accurate and complete. Bhavesh Anderson MD 12/18/21

## 2022-01-19 ENCOUNTER — TELEPHONE (OUTPATIENT)
Dept: FAMILY MEDICINE CLINIC | Facility: CLINIC | Age: 77
End: 2022-01-19

## 2022-01-19 NOTE — TELEPHONE ENCOUNTER
Caller: Deacon Neil    Relationship: Self    Best call back number: [phone]    What is the best time to reach you: ANYTIME    Who are you requesting to speak with (clinical staff, provider,  specific staff member): CLINICAL      What was the call regarding: THE PATIENT STATED HE IS IN PHYSICAL THERAPY FOR HIS BACK AND HIP PROBLEMS. THE PATIENT STATED HE WAS PUT ON MACHINES TO INCREASE FLEXIBILITY. HE STATED HE FELT ENERGIZED AT FIRST BUT SEVERAL DAYS LATER HE WAS BLEEDING WITH BOWEL MOVEMENTS. HE BELIEVES HE HAS AN INTERNAL HEMORRHOIDS. HE STATED THE BLEEDING HAS STOPPED FOR NOW BUT HE WOULD LIKE PCP RECOMMENDATION    Do you require a callback: YES

## 2022-01-19 NOTE — TELEPHONE ENCOUNTER
I would take it easy on any PT for the next 48-72 hours.  If the bleeding persist or comes back he needs to let us know or to the ER for eval. Soft foods/bland diet.

## 2022-01-24 ENCOUNTER — OFFICE VISIT (OUTPATIENT)
Dept: NEUROSURGERY | Facility: CLINIC | Age: 77
End: 2022-01-24

## 2022-01-24 VITALS — WEIGHT: 213 LBS | BODY MASS INDEX: 27.34 KG/M2 | HEIGHT: 74 IN

## 2022-01-24 DIAGNOSIS — M47.817 SPONDYLOSIS OF LUMBOSACRAL REGION WITHOUT MYELOPATHY OR RADICULOPATHY: Primary | ICD-10-CM

## 2022-01-24 DIAGNOSIS — M16.11 OSTEOARTHRITIS OF RIGHT HIP, UNSPECIFIED OSTEOARTHRITIS TYPE: ICD-10-CM

## 2022-01-24 PROCEDURE — 99212 OFFICE O/P EST SF 10 MIN: CPT | Performed by: NURSE PRACTITIONER

## 2022-01-24 NOTE — PROGRESS NOTES
"Chief Complaint  Back Pain    Subjective          Deacon Neil who is a 76 y.o. year old male who presents to [facility] for follow up of low back and right hip pain.     Pt has been working with physical therapy, which has provided improvement in his back pain and mobility. He is able to rise from a seated position and strength has improved. He continues to use a cane for support though ambulation has improved some. Continues to have pain in the right hip, radiating to the thigh stopping at the knee. He saw Dr. Anderson in December following his XR of the hips which revealed osteoarthritis worse on the right. There was discussion of hip surgery. He sees Orthopedic this week.       Interval History 11/29/21  Deacon Neil who is a 76 y.o. year old male who presents to [facility] for follow up of his low back and leg pain.      MRI Lumbar Spine on 11/3/21 personally reviewed. Multilevel spondylosis, most significant at L4/5 where this is mild anterolisthesis and osteophyte formation resulting in mild right lateral recess and foraminal narrowing. Moderate right foraminal narrowing at L2/3 and L3/4. No high grade canal stenosis.      Pain is severe, radiating into the right anterior thigh. Does not go past the knee. Pain is worse with standing, walking. Tramadol is providing benefit, though will occasionally take Tylenol in between doses.     Recent Interventions: Physical therapy      Review of Systems   Musculoskeletal: Positive for arthralgias, back pain, gait problem and bursitis.   All other systems reviewed and are negative.       Objective   Vital Signs:   Ht 188 cm (74\")   Wt 96.6 kg (213 lb)   BMI 27.35 kg/m²       Physical Exam  Vitals reviewed.   Constitutional:       Appearance: Normal appearance.   Neurological:      Mental Status: He is alert and oriented to person, place, and time.        Neurologic Exam " Refilled 3/24       Mental Status   Oriented to person, place, and time.   Level of consciousness: alert       Result Review :       Data reviewed: Radiologic studies XR bilateral hips revealing OA in both hips, right worse than left.           Assessment and Plan    Diagnoses and all orders for this visit:    1. Spondylosis of lumbosacral region without myelopathy or radiculopathy (Primary)    2. Osteoarthritis of right hip, unspecified osteoarthritis type    Pt with low back and right hip pain. He has responded well to physical therapy for his back pain. He is being followed by Ortho for arthritis of the right hip. He can follow up in our office on an as needed basis.       Follow Up   Return if symptoms worsen or fail to improve.  Patient was given instructions and counseling regarding his condition or for health maintenance advice.     -Keep appointment with Orthopedics   -Continue exercises with physical therapy  -Follow up as needed

## 2022-01-24 NOTE — PATIENT INSTRUCTIONS
-Keep appointment with Orthopedics   -Continue exercises with physical therapy  -Follow up as needed

## 2022-01-26 ENCOUNTER — TELEPHONE (OUTPATIENT)
Dept: FAMILY MEDICINE CLINIC | Facility: CLINIC | Age: 77
End: 2022-01-26

## 2022-01-26 DIAGNOSIS — K62.5 RECTAL BLEED: Primary | ICD-10-CM

## 2022-01-26 NOTE — TELEPHONE ENCOUNTER
Patient states he did exercises Monday and as of yesterday , he has had a large stool with bright red blood again. States he thinks possible internal hemorrhoid. States he is sched for PT today should he go?

## 2022-01-26 NOTE — TELEPHONE ENCOUNTER
Caller: Deacon Neil    Relationship: Self    Best call back number: [phone]    What is the best time to reach you: ANYTIME    Who are you requesting to speak with (clinical staff, provider,  specific staff member): SAURABH DOE       What was the call regarding: PATIENT IS RETURNING A CALL TO SAURABH DOE     Do you require a callback: YES

## 2022-01-26 NOTE — TELEPHONE ENCOUNTER
Patient aware , I advise the schedulers will call patient with sched appointment to GI. Patient verbalized understanding.

## 2022-01-27 ENCOUNTER — OFFICE VISIT (OUTPATIENT)
Dept: ORTHOPEDIC SURGERY | Facility: CLINIC | Age: 77
End: 2022-01-27

## 2022-01-27 ENCOUNTER — TELEPHONE (OUTPATIENT)
Dept: ORTHOPEDIC SURGERY | Facility: CLINIC | Age: 77
End: 2022-01-27

## 2022-01-27 VITALS — WEIGHT: 218 LBS | HEART RATE: 81 BPM | HEIGHT: 74 IN | BODY MASS INDEX: 27.98 KG/M2 | OXYGEN SATURATION: 91 %

## 2022-01-27 DIAGNOSIS — M16.11 PRIMARY OSTEOARTHRITIS OF RIGHT HIP: Primary | ICD-10-CM

## 2022-01-27 PROBLEM — M16.9 OA (OSTEOARTHRITIS) OF HIP: Status: ACTIVE | Noted: 2022-01-27

## 2022-01-27 PROCEDURE — 99214 OFFICE O/P EST MOD 30 MIN: CPT | Performed by: PHYSICIAN ASSISTANT

## 2022-01-27 NOTE — TELEPHONE ENCOUNTER
Caller: NAT SPENCER   Relationship to Patient: SELF     Phone Number: [phone]  Reason for Call: PATIENT IS CONFUSED BECAUSE HE GOT PAPER STATING THAT HE IS SUPPOSED TO DO PRE-OP APPTS TODAY?

## 2022-01-27 NOTE — PATIENT INSTRUCTIONS
Treatment options to include conservative management with steroid injections, intra-articular injections, physical therapy and home exercises as well as surgical intervention were discussed.  Given progression of symptoms and worsening pain patient would like to move forward with surgical intervention.  He spoke to myself and Dr. Anderson regarding the risks and benefits of a total hip arthroplasty, the procedure itself and rehab timeline.  Surgery ordered today, follow-up 2 weeks postop.

## 2022-01-27 NOTE — PROGRESS NOTES
"Chief Complaint  Pain of the Right Hip    Subjective          Deacon Neil presents to [facility] for follow-up on right hip, history of right hip osteoarthritis.  Patient does have a history of bilateral hip osteoarthritis, right worse than left.  At last visit he was given an intramuscular injection and started doing physical therapy which was greatly helping with the pain.  He states that heat and cold treatments at the end of therapy were beneficial in helping with pain.  He is using a cane for ambulation today but states whenever he was doing physical therapy he was able to discontinue the use of the cane.  Takes tramadol and Tylenol for pain relief.  He states he had to stop going to therapy because he has developed rectal bleeding with bowel movements.  He was told by his doctor to avoid heavy strenuous activity at this time.  He plans to follow-up with RIVAS Barker for further evaluation.  He states pain is worsening, he is walking with a limp and back on the cane and he would like to talk about surgical intervention.  He does have a history of lumbar spine L4-L5 disease and symptoms of sciatica which she sees Dr. Xie for.    Objective   Allergies   Allergen Reactions   • Pseudoephedrine Anaphylaxis   • Lisinopril Cough       Vital Signs:   Pulse 81   Ht 188 cm (74\")   Wt 98.9 kg (218 lb)   SpO2 91%   BMI 27.99 kg/m²       Physical Exam  Constitutional:       Appearance: Normal appearance. Patient is well-developed and normal weight.   HENT:      Head: Normocephalic.      Right Ear: Hearing and external ear normal.      Left Ear: Hearing and external ear normal.      Nose: Nose normal.   Eyes:      Conjunctiva/sclera: Conjunctivae normal.   Cardiovascular:      Rate and Rhythm: Normal rate.   Pulmonary:      Effort: Pulmonary effort is normal.      Breath sounds: No wheezing or rales.   Abdominal:      Palpations: Abdomen is soft.      Tenderness: There is no " abdominal tenderness.   Musculoskeletal:      Cervical back: Normal range of motion.   Skin:     Findings: No rash.   Neurological:      Mental Status: Patient is alert and oriented to person, place, and time.   Psychiatric:         Mood and Affect: Mood and affect normal.         Judgment: Judgment normal.     Ortho Exam  Right hip: Skin intact, no swelling, tenderness to the groin, pain with internal and external rotation, gait antalgic, walks with a limp while using the cane.  Good range of motion right knee ankle and digits.  Sensations intact in posterior tib pulses 2+, limited hip flexion and abduction due to pain and stiffness.  Result Review :            Imaging Results (Most Recent)     None                Assessment and Plan    Problem List Items Addressed This Visit        Musculoskeletal and Injuries    OA (osteoarthritis) of hip - Primary    Current Assessment & Plan     Treatment options to include conservative management with steroid injections, intra-articular injections, physical therapy and home exercises as well as surgical intervention were discussed.  Given progression of symptoms and worsening pain patient would like to move forward with surgical intervention.  He spoke to myself and Dr. Anderson regarding the risks and benefits of a total hip arthroplasty, the procedure itself and rehab timeline.  Surgery ordered today, follow-up 2 weeks postop.         Relevant Orders    Case Request (Completed)    Instructions on coughing, deep breathing, and incentive spirometry.    CBC & Differential    Comprehensive Metabolic Panel    Protime-INR    Hemoglobin A1c    Type & Screen    ECG 12 Lead    COVID PRE-OP / PRE-PROCEDURE SCREENING ORDER (NO ISOLATION) - Swab, Nasopharynx          Follow Up   Return for Post-operatively.  Patient Instructions   Treatment options to include conservative management with steroid injections, intra-articular injections, physical therapy and home exercises as well as surgical  intervention were discussed.  Given progression of symptoms and worsening pain patient would like to move forward with surgical intervention.  He spoke to myself and Dr. Anderson regarding the risks and benefits of a total hip arthroplasty, the procedure itself and rehab timeline.  Surgery ordered today, follow-up 2 weeks postop.    Patient was given instructions and counseling regarding his condition or for health maintenance advice. Please see specific information pulled into the AVS if appropriate.

## 2022-01-28 ENCOUNTER — TELEPHONE (OUTPATIENT)
Dept: FAMILY MEDICINE CLINIC | Facility: CLINIC | Age: 77
End: 2022-01-28

## 2022-02-02 ENCOUNTER — TELEPHONE (OUTPATIENT)
Dept: CARDIOLOGY | Facility: CLINIC | Age: 77
End: 2022-02-02

## 2022-02-02 NOTE — TELEPHONE ENCOUNTER
Procedure: (R) Total Hip Replacement    Med Directive: ASA    PMH: CAD s/p PCI 2013; HLD; HTN    Last Seen: 07/21/2021

## 2022-02-02 NOTE — TELEPHONE ENCOUNTER
Mr Neil may proceed with hip replacement surgery as planned. No further preoperative cardiac work-up required before the anticipated surgery. He will be at a moderate risk for perioperative cardiac events.    We prefer to continue aspirin 81 mg daily during the perioperative period. However, it may be held for 5 to 7 days if absolutely necessary from surgical standpoint.      Electronically signed by Randell Albert MD, 02/02/22, 12:13 PM EST.

## 2022-02-08 DIAGNOSIS — M16.11 PRIMARY OSTEOARTHRITIS OF RIGHT HIP: ICD-10-CM

## 2022-02-08 RX ORDER — TRAMADOL HYDROCHLORIDE 50 MG/1
50 TABLET ORAL EVERY 8 HOURS PRN
Qty: 28 TABLET | Refills: 0 | Status: SHIPPED | OUTPATIENT
Start: 2022-02-08 | End: 2022-03-10 | Stop reason: SDUPTHER

## 2022-02-09 RX ORDER — VALSARTAN AND HYDROCHLOROTHIAZIDE 320; 12.5 MG/1; MG/1
0.5 TABLET, FILM COATED ORAL DAILY
Qty: 45 TABLET | Refills: 1 | Status: SHIPPED | OUTPATIENT
Start: 2022-02-09 | End: 2022-06-29

## 2022-02-10 ENCOUNTER — TELEPHONE (OUTPATIENT)
Dept: ORTHOPEDIC SURGERY | Facility: CLINIC | Age: 77
End: 2022-02-10

## 2022-02-10 ENCOUNTER — PREP FOR SURGERY (OUTPATIENT)
Dept: OTHER | Facility: HOSPITAL | Age: 77
End: 2022-02-10

## 2022-02-10 ENCOUNTER — OFFICE VISIT (OUTPATIENT)
Dept: GASTROENTEROLOGY | Facility: CLINIC | Age: 77
End: 2022-02-10

## 2022-02-10 VITALS
HEIGHT: 74 IN | DIASTOLIC BLOOD PRESSURE: 66 MMHG | SYSTOLIC BLOOD PRESSURE: 145 MMHG | HEART RATE: 68 BPM | BODY MASS INDEX: 27.09 KG/M2 | WEIGHT: 211.1 LBS | OXYGEN SATURATION: 97 %

## 2022-02-10 DIAGNOSIS — K62.5 RECTAL BLEEDING: ICD-10-CM

## 2022-02-10 DIAGNOSIS — Z12.11 SCREENING FOR COLON CANCER: ICD-10-CM

## 2022-02-10 DIAGNOSIS — R19.4 ALTERED BOWEL HABITS: Primary | ICD-10-CM

## 2022-02-10 DIAGNOSIS — K59.00 CONSTIPATION, UNSPECIFIED CONSTIPATION TYPE: ICD-10-CM

## 2022-02-10 DIAGNOSIS — M25.551 BILATERAL HIP PAIN: Primary | ICD-10-CM

## 2022-02-10 DIAGNOSIS — M25.552 BILATERAL HIP PAIN: Primary | ICD-10-CM

## 2022-02-10 PROCEDURE — 99204 OFFICE O/P NEW MOD 45 MIN: CPT | Performed by: NURSE PRACTITIONER

## 2022-02-10 RX ORDER — HYDROCORTISONE 25 MG/G
CREAM TOPICAL 2 TIMES DAILY
Qty: 28 G | Refills: 1 | Status: SHIPPED | OUTPATIENT
Start: 2022-02-10 | End: 2022-11-15 | Stop reason: ALTCHOICE

## 2022-02-10 RX ORDER — POLYETHYLENE GLYCOL 3350 17 G/17G
17 POWDER, FOR SOLUTION ORAL DAILY
Qty: 578 G | Refills: 1 | Status: ON HOLD | OUTPATIENT
Start: 2022-02-10 | End: 2022-02-15

## 2022-02-10 RX ORDER — UBIDECARENONE 100 MG
200 CAPSULE ORAL EVERY MORNING
COMMUNITY

## 2022-02-10 NOTE — TELEPHONE ENCOUNTER
PATIENT CALLED AND IS REQUESTING AN ORDER FOR OUTPATIENT PT FOR BILATERAL HIP OA TO BE DONE AT [facility].     RT GUALBERTO SCHEDULED FOR 3/18/22.

## 2022-02-10 NOTE — PROGRESS NOTES
Chief Complaint        Constipation, and rectal bleeding     History of Present Illness      Deacon Neil is a 76 y.o. male who presents to [facility] as a new patient with a history of constipation and rectal bleeding.  Patient reports that he was started on tramadol by his orthopedic surgeon and developed constipation with firm difficult to pass stools.  He developed rectal bleeding that is occurring every 3 to 4 days with bowel movements.  Patient reports he attends physical therapy for his hip on Mondays and Wednesdays and he usually has a bowel movement following those appointments.  Patient is scheduled for total hip replacement in March.  Patient reports has been trying to eat a bland diet.  He has lost 14 pounds over the past 3 months.  Patient has not taking anything for constipation.  He has not used any medication for the hemorrhoids.  Denies family history of colon cancer.  Patient denies fever, nausea, vomiting, night sweats, melena, hematochezia, hematemesis.    Patient has never had a colonoscopy.     Results       Result Review :   The following data was reviewed by: Hannah Steven NP on 02/10/2022     CMP    CMP 4/17/21   Glucose 103 (A)   BUN 19   Creatinine 1.02   Sodium 140   Potassium 4.2   Chloride 104   Calcium 9.4   Albumin 4.3   Total Bilirubin 0.54   Alkaline Phosphatase 80   AST (SGOT) 19   ALT (SGPT) 22   (A) Abnormal value                           Past Medical History       Past Medical History:   Diagnosis Date   • Bladder tumor     MYOFIBROBLASTIC TUMOR   • BPH (benign prostatic hyperplasia)    • Coronary artery disease involving native heart without angina pectoris 6/21/2013     Coronary artery disease. Cardiac catheterization on 06/21/2013 showed 80-85% stenosis of the mid LAD, 70-75% lesion of the diagonal 1 branch, and 40% stenosis of the proximal left circumflex artery. Medical management was advised at that point;    • Hyperlipidemia    •  Hypertension, essential    • Psoriasis        Past Surgical History:   Procedure Laterality Date   • CATARACT EXTRACTION     • CIRCUMCISION     • CYSTOSCOPY     • TONSILLECTOMY     • VASECTOMY           Current Outpatient Medications:   •  acetaminophen (TYLENOL) 500 MG tablet, Tylenol Extra Strength 500 mg oral tablet take 2 tablets (1,000 mg) by oral route every 6 hours as needed   Active, Disp: , Rfl:   •  aspirin 81 MG EC tablet, TAKE 1 TABLET BY MOUTH EVERY DAY, Disp: 90 tablet, Rfl: 1  •  coenzyme Q10 100 MG capsule, Take 200 mg by mouth Daily., Disp: , Rfl:   •  Cosentyx Sensoready, 300 MG, 150 MG/ML solution auto-injector, , Disp: , Rfl:   •  desloratadine (CLARINEX) 5 MG tablet, , Disp: , Rfl:   •  desonide (DESOWEN) 0.05 % lotion, desonide 0.05 % topical lotion apply sparingly and rub gently into the affected area(s) by topical route 2 times per day   Active, Disp: , Rfl:   •  econazole nitrate (SPECTAZOLE) 1 % cream, econazole 1 % topical cream apply to the affected and surrounding areas of skin by topical route once daily   Active, Disp: , Rfl:   •  ezetimibe (ZETIA) 10 MG tablet, Take 1 tablet by mouth Daily., Disp: 90 tablet, Rfl: 1  •  guaiFENesin 100 MG pack, Take 400 mg by mouth Every 4 (Four) Hours As Needed., Disp: , Rfl:   •  metoprolol succinate XL (TOPROL-XL) 25 MG 24 hr tablet, metoprolol succinate 25 mg oral tablet extended release 24 hr TAKE 1 TABLET BY MOUTH EVERY DAY 4/5/2021  Active, Disp: , Rfl:   •  rosuvastatin (CRESTOR) 10 MG tablet, Take 1 tab every other day., Disp: 45 tablet, Rfl: 3  •  traMADol (ULTRAM) 50 MG tablet, Take 1 tablet by mouth Every 8 (Eight) Hours As Needed for Moderate Pain ., Disp: 28 tablet, Rfl: 0  •  valsartan-hydrochlorothiazide (DIOVAN-HCT) 320-12.5 MG per tablet, Take 0.5 tablets by mouth Daily., Disp: 45 tablet, Rfl: 1  •  Hydrocortisone, Perianal, (Anusol-HC) 2.5 % rectal cream, Insert  into the rectum 2 (Two) Times a Day., Disp: 28 g, Rfl: 1  •   "polyethylene glycol (MIRALAX) 17 GM/SCOOP powder, Take 17 g by mouth Daily., Disp: 578 g, Rfl: 1     Allergies   Allergen Reactions   • Pseudoephedrine Anaphylaxis   • Lisinopril Cough       Family History   Problem Relation Age of Onset   • Hypertension Mother    • Colon cancer Neg Hx         Social History     Social History Narrative   • Not on file       Objective       Objective     Vital Signs:   /66 (BP Location: Left arm, Patient Position: Sitting, Cuff Size: Adult)   Pulse 68   Ht 188 cm (74\")   Wt 95.8 kg (211 lb 1.6 oz)   SpO2 97%   BMI 27.10 kg/m²     Body mass index is 27.1 kg/m².    Physical Exam  Constitutional:       General: He is not in acute distress.     Appearance: Normal appearance.   HENT:      Head: Normocephalic.   Eyes:      Conjunctiva/sclera: Conjunctivae normal.      Pupils: Pupils are equal, round, and reactive to light.      Visual Fields: Right eye visual fields normal and left eye visual fields normal.   Neck:      Trachea: Trachea normal.   Cardiovascular:      Rate and Rhythm: Normal rate and regular rhythm.      Heart sounds: Normal heart sounds.   Pulmonary:      Effort: Pulmonary effort is normal.      Breath sounds: Normal breath sounds and air entry.      Comments: Inspection of chest: normal appearance  Abdominal:      General: Abdomen is flat. Bowel sounds are normal.      Palpations: Abdomen is soft. There is no mass.      Tenderness: There is no guarding.   Musculoskeletal:      Right lower leg: No edema.      Left lower leg: No edema.   Skin:     Findings: No lesion.      Comments: Turgor is normal   Neurological:      Mental Status: He is alert and oriented to person, place, and time.   Psychiatric:         Mood and Affect: Mood and affect normal.              Assessment & Plan          Assessment and Plan    Diagnoses and all orders for this visit:    1. Altered bowel habits (Primary)    2. Constipation, unspecified constipation type    3. Rectal " bleeding    4. Screening for colon cancer    Other orders  -     Hydrocortisone, Perianal, (Anusol-HC) 2.5 % rectal cream; Insert  into the rectum 2 (Two) Times a Day.  Dispense: 28 g; Refill: 1  -     polyethylene glycol (MIRALAX) 17 GM/SCOOP powder; Take 17 g by mouth Daily.  Dispense: 578 g; Refill: 1      76-year-old male presenting the office today as a new patient with a history of constipation, rectal bleeding and in need of a screening colonoscopy.  Plan:  I have recommended that the patient undergo further evaluation with a colonoscopy.  I have discussed this procedure in detail with the patient.  I have discussed the risks, benefits and alternatives.  I have discussed the risk of anesthesia, bleeding and perforation.  Patient understands these risks, benefits and alternatives and wishes to proceed.  I will schedule him at his earliest convenience.  I have prescribed Anusol HC to be used as needed for hemorrhoid relief.  I have prescribed MiraLAX to be used daily to help soften the stools and prevent constipation.  Patient will follow up in the office if needed after colonoscopy.  Patient agreeable to this plan will call with any questions or concerns.            Follow Up       Follow Up   Return for Follow up after endoscopy in office.  Patient was given instructions and counseling regarding his condition or for health maintenance advice. Please see specific information pulled into the AVS if appropriate.

## 2022-02-10 NOTE — PROGRESS NOTES
Mr Neil is scheduled for a colonoscopy on Monday.. clenpiq sample bowel prep given to julian Mares

## 2022-02-11 NOTE — PRE-PROCEDURE INSTRUCTIONS
Pt. Instructed on laxative and skin prep, pre-op meds,clear liquid diet. Ok to take all meds except Aspirin,Co-Q10, Valsartan a.m. of procedure.

## 2022-02-14 ENCOUNTER — PREP FOR SURGERY (OUTPATIENT)
Dept: OTHER | Facility: HOSPITAL | Age: 77
End: 2022-02-14

## 2022-02-14 ENCOUNTER — ANESTHESIA EVENT (OUTPATIENT)
Dept: GASTROENTEROLOGY | Facility: HOSPITAL | Age: 77
End: 2022-02-14

## 2022-02-14 ENCOUNTER — HOSPITAL ENCOUNTER (OUTPATIENT)
Facility: HOSPITAL | Age: 77
Setting detail: HOSPITAL OUTPATIENT SURGERY
Discharge: HOME | End: 2022-02-14
Attending: INTERNAL MEDICINE | Admitting: INTERNAL MEDICINE

## 2022-02-14 ENCOUNTER — ANESTHESIA (OUTPATIENT)
Dept: GASTROENTEROLOGY | Facility: HOSPITAL | Age: 77
End: 2022-02-14

## 2022-02-14 VITALS
WEIGHT: 214.29 LBS | RESPIRATION RATE: 18 BRPM | TEMPERATURE: 97.8 F | SYSTOLIC BLOOD PRESSURE: 141 MMHG | HEART RATE: 74 BPM | DIASTOLIC BLOOD PRESSURE: 78 MMHG | OXYGEN SATURATION: 93 % | BODY MASS INDEX: 27.51 KG/M2

## 2022-02-14 DIAGNOSIS — M16.11 PRIMARY OSTEOARTHRITIS OF RIGHT HIP: Primary | ICD-10-CM

## 2022-02-14 DIAGNOSIS — Z12.11 SCREENING FOR COLON CANCER: Primary | ICD-10-CM

## 2022-02-14 RX ORDER — SODIUM CHLORIDE, SODIUM LACTATE, POTASSIUM CHLORIDE, CALCIUM CHLORIDE 600; 310; 30; 20 MG/100ML; MG/100ML; MG/100ML; MG/100ML
30 INJECTION, SOLUTION INTRAVENOUS CONTINUOUS
Status: DISCONTINUED | OUTPATIENT
Start: 2022-02-14 | End: 2022-02-14 | Stop reason: HOSPADM

## 2022-02-14 NOTE — ANESTHESIA PREPROCEDURE EVALUATION
Anesthesia Evaluation     Patient summary reviewed and Nursing notes reviewed   no history of anesthetic complications:  NPO Solid Status: > 8 hours  NPO Liquid Status: > 2 hours           Airway   Mallampati: I  TM distance: >3 FB  Neck ROM: full  No difficulty expected  Dental      Pulmonary - normal exam    breath sounds clear to auscultation  (+) a smoker Former,   Cardiovascular - normal exam  Exercise tolerance: poor (<4 METS)    Rhythm: regular  Rate: normal    (+) hypertension, CAD, hyperlipidemia,       Neuro/Psych- negative ROS  GI/Hepatic/Renal/Endo    (+)  GI bleeding ,     Musculoskeletal     (+) back pain,   Abdominal    Substance History - negative use     OB/GYN          Other   arthritis,                      Anesthesia Plan    ASA 3     general   total IV anesthesia    Anesthetic plan, all risks, benefits, and alternatives have been provided, discussed and informed consent has been obtained with: patient.        CODE STATUS:

## 2022-02-14 NOTE — ANESTHESIA POSTPROCEDURE EVALUATION
Patient: Deacon Neil    Procedure Summary     Date: 02/14/22 Room / Location: [facility] ENDOSCOPY 2 / [facility] ENDOSCOPY    Anesthesia Start:  Anesthesia Stop:     Procedure: COLONOSCOPY (N/A ) Diagnosis:       Altered bowel habits      Constipation, unspecified constipation type      Rectal bleeding      Screening for colon cancer      (Altered bowel habits [R19.4])      (Constipation, unspecified constipation type [K59.00])      (Rectal bleeding [K62.5])      (Screening for colon cancer [Z12.11])    Surgeons: Roni Pedraza MD Provider:     Anesthesia Type: general ASA Status: 3          Anesthesia Type: general    Vitals  No vitals data found for the desired time range.          Post Anesthesia Care and Evaluation    Patient location during evaluation: bedside  Patient participation: complete - patient participated  Level of consciousness: awake  Pain management: adequate  Airway patency: patent  Anesthetic complications: No anesthetic complications  PONV Status: none  Cardiovascular status: acceptable and stable  Respiratory status: acceptable  Hydration status: acceptable    Comments: An Anesthesiologist personally participated in the most demanding procedures (including induction and emergence if applicable) in the anesthesia plan, monitored the course of anesthesia administration at frequent intervals and remained physically present and available for immediate diagnosis and treatment of emergencies.

## 2022-02-15 ENCOUNTER — ANESTHESIA (OUTPATIENT)
Dept: GASTROENTEROLOGY | Facility: HOSPITAL | Age: 77
End: 2022-02-15

## 2022-02-15 ENCOUNTER — HOSPITAL ENCOUNTER (OUTPATIENT)
Facility: HOSPITAL | Age: 77
Setting detail: HOSPITAL OUTPATIENT SURGERY
Discharge: HOME | End: 2022-02-15
Attending: INTERNAL MEDICINE | Admitting: INTERNAL MEDICINE

## 2022-02-15 ENCOUNTER — ANESTHESIA EVENT (OUTPATIENT)
Dept: GASTROENTEROLOGY | Facility: HOSPITAL | Age: 77
End: 2022-02-15

## 2022-02-15 VITALS
WEIGHT: 211.64 LBS | RESPIRATION RATE: 18 BRPM | OXYGEN SATURATION: 94 % | SYSTOLIC BLOOD PRESSURE: 126 MMHG | HEART RATE: 74 BPM | DIASTOLIC BLOOD PRESSURE: 69 MMHG | TEMPERATURE: 97.3 F | BODY MASS INDEX: 27.17 KG/M2

## 2022-02-15 PROCEDURE — 45378 DIAGNOSTIC COLONOSCOPY: CPT | Performed by: INTERNAL MEDICINE

## 2022-02-15 PROCEDURE — 25010000002 PROPOFOL 10 MG/ML EMULSION: Performed by: NURSE ANESTHETIST, CERTIFIED REGISTERED

## 2022-02-15 RX ORDER — SODIUM CHLORIDE, SODIUM LACTATE, POTASSIUM CHLORIDE, CALCIUM CHLORIDE 600; 310; 30; 20 MG/100ML; MG/100ML; MG/100ML; MG/100ML
30 INJECTION, SOLUTION INTRAVENOUS CONTINUOUS
Status: DISCONTINUED | OUTPATIENT
Start: 2022-02-15 | End: 2022-02-15 | Stop reason: HOSPADM

## 2022-02-15 RX ORDER — PROPOFOL 10 MG/ML
VIAL (ML) INTRAVENOUS AS NEEDED
Status: DISCONTINUED | OUTPATIENT
Start: 2022-02-15 | End: 2022-02-15 | Stop reason: SURG

## 2022-02-15 RX ORDER — LIDOCAINE HYDROCHLORIDE 20 MG/ML
INJECTION, SOLUTION INFILTRATION; PERINEURAL AS NEEDED
Status: DISCONTINUED | OUTPATIENT
Start: 2022-02-15 | End: 2022-02-15 | Stop reason: SURG

## 2022-02-15 RX ADMIN — PROPOFOL 50 MG: 10 INJECTION, EMULSION INTRAVENOUS at 08:05

## 2022-02-15 RX ADMIN — PROPOFOL 150 MCG/KG/MIN: 10 INJECTION, EMULSION INTRAVENOUS at 08:05

## 2022-02-15 RX ADMIN — LIDOCAINE HYDROCHLORIDE 100 MG: 20 INJECTION, SOLUTION INFILTRATION; PERINEURAL at 08:05

## 2022-02-15 RX ADMIN — SODIUM CHLORIDE, POTASSIUM CHLORIDE, SODIUM LACTATE AND CALCIUM CHLORIDE 30 ML/HR: 600; 310; 30; 20 INJECTION, SOLUTION INTRAVENOUS at 07:24

## 2022-02-15 NOTE — ANESTHESIA PREPROCEDURE EVALUATION
Anesthesia Evaluation     Patient summary reviewed and Nursing notes reviewed                Airway   Mallampati: I  TM distance: >3 FB  Neck ROM: full  No difficulty expected  Dental      Pulmonary - negative pulmonary ROS and normal exam    breath sounds clear to auscultation  Cardiovascular - normal exam    Rhythm: regular  Rate: normal    (+) hypertension, CAD, hyperlipidemia,       Neuro/Psych- negative ROS  GI/Hepatic/Renal/Endo    (+)  GI bleeding ,     Musculoskeletal     (+) back pain,   Abdominal    Substance History - negative use     OB/GYN negative ob/gyn ROS         Other   arthritis,                      Anesthesia Plan    ASA 3     general     intravenous induction     Anesthetic plan, all risks, benefits, and alternatives have been provided, discussed and informed consent has been obtained with: patient.        CODE STATUS:

## 2022-02-15 NOTE — H&P
ScreeningPre Procedure History & Physical    Chief Complaint:   Screening     Subjective     HPI:   Screening     Past Medical History:   Past Medical History:   Diagnosis Date   • Anesthesia complication     heart rate dropped    • Arthritis     hips   • Back pain    • Bladder tumor     MYOFIBROBLASTIC TUMOR   • BPH (benign prostatic hyperplasia)    • Coronary artery disease involving native heart without angina pectoris 6/21/2013     Coronary artery disease. Cardiac catheterization on 06/21/2013 showed 80-85% stenosis of the mid LAD, 70-75% lesion of the diagonal 1 branch, and 40% stenosis of the proximal left circumflex artery. Medical management was advised at that point;    • Hyperlipidemia    • Hypertension, essential    • Psoriasis        Past Surgical History:  Past Surgical History:   Procedure Laterality Date   • CATARACT EXTRACTION     • CIRCUMCISION     • CYSTOSCOPY     • TONSILLECTOMY     • VASECTOMY         Family History:  Family History   Problem Relation Age of Onset   • Hypertension Mother    • Colon cancer Neg Hx    • Malig Hyperthermia Neg Hx        Social History:   reports that he quit smoking about 8 years ago. He has never used smokeless tobacco. He reports previous alcohol use. He reports that he does not use drugs.    Medications:   Medications Prior to Admission   Medication Sig Dispense Refill Last Dose   • acetaminophen (TYLENOL) 500 MG tablet Tylenol Extra Strength 500 mg oral tablet take 2 tablets (1,000 mg) by oral route every 6 hours as needed   Active   2/15/2022 at Unknown time   • aspirin 81 MG EC tablet TAKE 1 TABLET BY MOUTH EVERY DAY 90 tablet 1 Past Week at Unknown time   • coenzyme Q10 100 MG capsule Take 200 mg by mouth Daily.   2/14/2022 at Unknown time   • Cosentyx Sensoready, 300 MG, 150 MG/ML solution auto-injector Daily As Needed.   2/14/2022 at Unknown time   • desloratadine (CLARINEX) 5 MG tablet Take 5 mg by mouth Every Night.   2/14/2022 at Unknown time   • desonide  (DESOWEN) 0.05 % lotion desonide 0.05 % topical lotion apply sparingly and rub gently into the affected area(s) by topical route 2 times per day   Active   2/14/2022 at Unknown time   • econazole nitrate (SPECTAZOLE) 1 % cream econazole 1 % topical cream apply to the affected and surrounding areas of skin by topical route once daily   Active   2/14/2022 at Unknown time   • ezetimibe (ZETIA) 10 MG tablet Take 1 tablet by mouth Daily. 90 tablet 1 2/14/2022 at Unknown time   • guaiFENesin 100 MG pack Take 400 mg by mouth Every 4 (Four) Hours As Needed.   2/14/2022 at Unknown time   • Hydrocortisone, Perianal, (Anusol-HC) 2.5 % rectal cream Insert  into the rectum 2 (Two) Times a Day. 28 g 1 2/14/2022 at Unknown time   • metoprolol succinate XL (TOPROL-XL) 25 MG 24 hr tablet metoprolol succinate 25 mg oral tablet extended release 24 hr TAKE 1 TABLET BY MOUTH EVERY DAY 4/5/2021  Active   2/14/2022 at Unknown time   • rosuvastatin (CRESTOR) 10 MG tablet Take 1 tab every other day. 45 tablet 3 2/14/2022 at Unknown time   • Simethicone (GAS-X PO) Take  by mouth. For procedure.   2/14/2022 at Unknown time   • traMADol (ULTRAM) 50 MG tablet Take 1 tablet by mouth Every 8 (Eight) Hours As Needed for Moderate Pain . 28 tablet 0 2/15/2022 at Unknown time   • valsartan-hydrochlorothiazide (DIOVAN-HCT) 320-12.5 MG per tablet Take 0.5 tablets by mouth Daily. 45 tablet 1 2/14/2022 at Unknown time       Allergies:  Pseudoephedrine and Lisinopril        Objective     Blood pressure 132/66, pulse 69, temperature 97.3 °F (36.3 °C), temperature source Temporal, resp. rate 18, weight 96 kg (211 lb 10.3 oz), SpO2 94 %.    Physical Exam   Constitutional: Pt is oriented to person, place, and time and well-developed, well-nourished, and in no distress.   Mouth/Throat: Oropharynx is clear and moist.   Neck: Normal range of motion.   Cardiovascular: Normal rate, regular rhythm and normal heart sounds.    Pulmonary/Chest: Effort normal and  breath sounds normal.   Abdominal: Soft. Nontender  Skin: Skin is warm and dry.   Psychiatric: Mood, memory, affect and judgment normal.     Assessment/Plan     Diagnosis:  Screening colonoscopy       Anticipated Surgical Procedure:  colonoscopy    The risks, benefits, and alternatives of this procedure have been discussed with the patient or the responsible party- the patient understands and agrees to proceed.

## 2022-02-15 NOTE — ANESTHESIA POSTPROCEDURE EVALUATION
Patient: Deacon Neil    Procedure Summary     Date: 02/15/22 Room / Location: [facility] ENDOSCOPY 2 / [facility] ENDOSCOPY    Anesthesia Start: 0803 Anesthesia Stop: 0856    Procedure: COLONOSCOPY (N/A ) Diagnosis:       Screening for colon cancer      (Screening for colon cancer [Z12.11])    Surgeons: Roni Pedraza MD Provider: Dany Quintana MD    Anesthesia Type: general ASA Status: 3          Anesthesia Type: general    Vitals  Vitals Value Taken Time   /60 02/15/22 0859   Temp     Pulse 81 02/15/22 0900   Resp     SpO2 94 % 02/15/22 0900   Vitals shown include unvalidated device data.        Post Anesthesia Care and Evaluation    Patient location during evaluation: bedside  Patient participation: complete - patient participated  Level of consciousness: awake  Pain management: adequate  Airway patency: patent  Anesthetic complications: No anesthetic complications  PONV Status: none  Cardiovascular status: acceptable  Respiratory status: acceptable  Hydration status: acceptable    Comments: An Anesthesiologist personally participated in the most demanding procedures (including induction and emergence if applicable) in the anesthesia plan, monitored the course of anesthesia administration at frequent intervals and remained physically present and available for immediate diagnosis and treatment of emergencies.

## 2022-03-08 ENCOUNTER — PRE-ADMISSION TESTING (OUTPATIENT)
Dept: PREADMISSION TESTING | Facility: HOSPITAL | Age: 77
End: 2022-03-08

## 2022-03-08 VITALS
TEMPERATURE: 97.5 F | BODY MASS INDEX: 27.02 KG/M2 | OXYGEN SATURATION: 94 % | SYSTOLIC BLOOD PRESSURE: 142 MMHG | HEIGHT: 74 IN | DIASTOLIC BLOOD PRESSURE: 70 MMHG | WEIGHT: 210.54 LBS | RESPIRATION RATE: 16 BRPM | HEART RATE: 64 BPM

## 2022-03-08 DIAGNOSIS — M16.11 PRIMARY OSTEOARTHRITIS OF RIGHT HIP: ICD-10-CM

## 2022-03-08 LAB
ALBUMIN SERPL-MCNC: 4.3 G/DL (ref 3.5–5.2)
ALBUMIN/GLOB SERPL: 1.5 G/DL
ALP SERPL-CCNC: 97 U/L (ref 39–117)
ALT SERPL W P-5'-P-CCNC: 18 U/L (ref 1–41)
ANION GAP SERPL CALCULATED.3IONS-SCNC: 11.3 MMOL/L (ref 5–15)
AST SERPL-CCNC: 15 U/L (ref 1–40)
BASOPHILS # BLD AUTO: 0.1 10*3/MM3 (ref 0–0.2)
BASOPHILS NFR BLD AUTO: 1.4 % (ref 0–1.5)
BILIRUB SERPL-MCNC: 0.4 MG/DL (ref 0–1.2)
BUN SERPL-MCNC: 17 MG/DL (ref 8–23)
BUN/CREAT SERPL: 16.8 (ref 7–25)
CALCIUM SPEC-SCNC: 9.3 MG/DL (ref 8.6–10.5)
CHLORIDE SERPL-SCNC: 100 MMOL/L (ref 98–107)
CO2 SERPL-SCNC: 24.7 MMOL/L (ref 22–29)
CREAT SERPL-MCNC: 1.01 MG/DL (ref 0.76–1.27)
DEPRECATED RDW RBC AUTO: 40 FL (ref 37–54)
EGFRCR SERPLBLD CKD-EPI 2021: 76.6 ML/MIN/1.73
EOSINOPHIL # BLD AUTO: 0.31 10*3/MM3 (ref 0–0.4)
EOSINOPHIL NFR BLD AUTO: 4.4 % (ref 0.3–6.2)
ERYTHROCYTE [DISTWIDTH] IN BLOOD BY AUTOMATED COUNT: 12.8 % (ref 12.3–15.4)
GLOBULIN UR ELPH-MCNC: 2.8 GM/DL
GLUCOSE SERPL-MCNC: 106 MG/DL (ref 65–99)
HBA1C MFR BLD: 5.7 % (ref 4.8–5.6)
HCT VFR BLD AUTO: 41.2 % (ref 37.5–51)
HGB BLD-MCNC: 13.8 G/DL (ref 13–17.7)
IMM GRANULOCYTES # BLD AUTO: 0.01 10*3/MM3 (ref 0–0.05)
IMM GRANULOCYTES NFR BLD AUTO: 0.1 % (ref 0–0.5)
INR PPP: 1.02 (ref 2–3)
LYMPHOCYTES # BLD AUTO: 1.51 10*3/MM3 (ref 0.7–3.1)
LYMPHOCYTES NFR BLD AUTO: 21.2 % (ref 19.6–45.3)
MCH RBC QN AUTO: 28.8 PG (ref 26.6–33)
MCHC RBC AUTO-ENTMCNC: 33.5 G/DL (ref 31.5–35.7)
MCV RBC AUTO: 85.8 FL (ref 79–97)
MONOCYTES # BLD AUTO: 0.8 10*3/MM3 (ref 0.1–0.9)
MONOCYTES NFR BLD AUTO: 11.3 % (ref 5–12)
NEUTROPHILS NFR BLD AUTO: 4.38 10*3/MM3 (ref 1.7–7)
NEUTROPHILS NFR BLD AUTO: 61.6 % (ref 42.7–76)
NRBC BLD AUTO-RTO: 0 /100 WBC (ref 0–0.2)
PLATELET # BLD AUTO: 321 10*3/MM3 (ref 140–450)
PMV BLD AUTO: 10.3 FL (ref 6–12)
POTASSIUM SERPL-SCNC: 4.1 MMOL/L (ref 3.5–5.2)
PROT SERPL-MCNC: 7.1 G/DL (ref 6–8.5)
PROTHROMBIN TIME: 10.7 SECONDS (ref 9.4–12)
RBC # BLD AUTO: 4.8 10*6/MM3 (ref 4.14–5.8)
SODIUM SERPL-SCNC: 136 MMOL/L (ref 136–145)
WBC NRBC COR # BLD: 7.11 10*3/MM3 (ref 3.4–10.8)

## 2022-03-08 PROCEDURE — 85610 PROTHROMBIN TIME: CPT

## 2022-03-08 PROCEDURE — 36415 COLL VENOUS BLD VENIPUNCTURE: CPT

## 2022-03-08 PROCEDURE — 93005 ELECTROCARDIOGRAM TRACING: CPT

## 2022-03-08 PROCEDURE — 83036 HEMOGLOBIN GLYCOSYLATED A1C: CPT

## 2022-03-08 PROCEDURE — 85025 COMPLETE CBC W/AUTO DIFF WBC: CPT

## 2022-03-08 PROCEDURE — 80053 COMPREHEN METABOLIC PANEL: CPT

## 2022-03-08 RX ORDER — POLYETHYLENE GLYCOL 3350 17 G/17G
17 POWDER, FOR SOLUTION ORAL DAILY
COMMUNITY
Start: 2022-02-10 | End: 2022-03-24

## 2022-03-08 ASSESSMENT — HOOS JR
HOOS JR SCORE: 12
HOOS JR SCORE: 52.965

## 2022-03-08 NOTE — SIGNIFICANT NOTE
PT ATTENDED THE TOTAL JOINT INSTRUCTION CLASS AND INSTRUCTIONAL MATERIALS REVIEWED AND GIVEN. PT VERBALIZED UNDERSTANDING.

## 2022-03-08 NOTE — DISCHARGE INSTRUCTIONS
IMPORTANT INSTRUCTIONS - PRE-ADMISSION TESTING  DO NOT EAT OR CHEW anything after midnight the night before your procedure.    You may have CLEAR liquids up to ___3___ hours prior to ARRIVAL time. INCLUDES 20 OZ OF GATORADE NO RED  Take the following medications the morning of your procedure with JUST A SIP OF WATER:  _CRESTOR, AS NEEDED: TRAMADOL, TYLENOL______________________________________________________________________________________________________________________________________________________________________________________    DO NOT BRING your medications to the hospital with you, UNLESS something has changed since your PRE-Admission Testing appointment.  Hold all vitamins, supplements, and NSAIDS (Non- steroidal anti-inflammatory meds) for one week prior to surgery (you MAY take Tylenol or Acetaminophen). HOLD CO Q 10 LAST DOSE 3/10/22  If you are diabetic, check your blood sugar the morning of your procedure. If it is less than 70 or if you are feeling symptomatic, call the following number for further instructions: [phone] SAME DAY SURGERY WILL CALL 3/17/22 BY 3 P.M. YOUR ARRIVAL TIME______.  Use your inhalers/nebulizers as usual, the morning of your procedure. BRING YOUR INHALERS with you. NA  Bring your CPAP or BIPAP to hospital, ONLY IF YOU WILL BE SPENDING THE NIGHT. NA  Make sure you have a ride home and have someone who will stay with you the day of your procedure after you go home.  If you have any questions, please call your Pre-Admission Testing Nurse, _____FORD___________ at [phone]___________.   Per anesthesia request, do not smoke for 24 hours before your procedure or as instructed by your surgeon. NA  SHOWER WITH SURGICAL SOAP AS SCHEDULED AND DIRECTED ON PAGE 9 OF TOTAL JOINT EDUCATION BOOKLET  RETURN THE DAY OF SURGERY WITH TOTAL JOINT EDUCATION BOOKLET.

## 2022-03-08 NOTE — SIGNIFICANT NOTE
PLANS TO USE [facility]. WALKER TO BE DELIVERED TO [facility] AND PT HAS REVIEVED 3 IN 1  PT DOES NOT KNOW WHICH HOME HEALTH PT AGENCY HE WOULD USE.

## 2022-03-08 NOTE — SIGNIFICANT NOTE
PT ATTENDED THE TOTAL JOINT CLASS. EDUCATIONAL MATERIALS AND SOAP GIVEN. PT VERBALIZED UNDERSTANDING

## 2022-03-09 ENCOUNTER — TELEPHONE (OUTPATIENT)
Dept: GASTROENTEROLOGY | Facility: CLINIC | Age: 77
End: 2022-03-09

## 2022-03-09 NOTE — TELEPHONE ENCOUNTER
"Patient called the office stating he has had diarrhea since taking the Linzess and Miralax. I explained to patient that the Linzess does have a \"clean out\" phase. I advised pt to stop taking the Miralax, and if the Linzess alone continues to cause diarrhea vs regulating bowel movements, then maybe we can discuss with provider about lowering the dose of the Linzess. Pt agreed to this plan. Pt aware to contact me at the office if the diarrhea worsens, or if he needs anything at all. Pt expressed understanding.   "

## 2022-03-10 DIAGNOSIS — M16.11 PRIMARY OSTEOARTHRITIS OF RIGHT HIP: ICD-10-CM

## 2022-03-10 RX ORDER — TRAMADOL HYDROCHLORIDE 50 MG/1
50 TABLET ORAL EVERY 8 HOURS PRN
Qty: 21 TABLET | Refills: 0 | Status: SHIPPED | OUTPATIENT
Start: 2022-03-10 | End: 2022-03-19 | Stop reason: HOSPADM

## 2022-03-10 NOTE — TELEPHONE ENCOUNTER
Patient is requesting enough Tramadol to get him to his surgery date 3-18-22. He is also requesting something to help with his anxiety he is having over the up coming surgery.   I did explain this in not something we usually do.

## 2022-03-11 ENCOUNTER — ANESTHESIA EVENT (OUTPATIENT)
Dept: PERIOP | Facility: HOSPITAL | Age: 77
End: 2022-03-11

## 2022-03-12 LAB — QT INTERVAL: 436 MS

## 2022-03-18 ENCOUNTER — APPOINTMENT (OUTPATIENT)
Dept: GENERAL RADIOLOGY | Facility: HOSPITAL | Age: 77
End: 2022-03-18

## 2022-03-18 ENCOUNTER — HOSPITAL ENCOUNTER (OUTPATIENT)
Facility: HOSPITAL | Age: 77
Discharge: HOME HEALTH | End: 2022-03-19
Attending: ORTHOPAEDIC SURGERY | Admitting: ORTHOPAEDIC SURGERY

## 2022-03-18 ENCOUNTER — ANESTHESIA (OUTPATIENT)
Dept: PERIOP | Facility: HOSPITAL | Age: 77
End: 2022-03-18

## 2022-03-18 DIAGNOSIS — M16.11 PRIMARY OSTEOARTHRITIS OF RIGHT HIP: ICD-10-CM

## 2022-03-18 DIAGNOSIS — M16.6 OTHER SECONDARY OSTEOARTHRITIS OF BOTH HIPS: ICD-10-CM

## 2022-03-18 DIAGNOSIS — R26.2 DIFFICULTY WALKING: Primary | ICD-10-CM

## 2022-03-18 DIAGNOSIS — Z78.9 DECREASED ACTIVITIES OF DAILY LIVING (ADL): ICD-10-CM

## 2022-03-18 PROCEDURE — 94799 UNLISTED PULMONARY SVC/PX: CPT

## 2022-03-18 PROCEDURE — 94761 N-INVAS EAR/PLS OXIMETRY MLT: CPT

## 2022-03-18 PROCEDURE — 76000 FLUOROSCOPY <1 HR PHYS/QHP: CPT

## 2022-03-18 PROCEDURE — 25010000002 EPINEPHRINE 1 MG/ML SOLUTION: Performed by: ORTHOPAEDIC SURGERY

## 2022-03-18 PROCEDURE — 25010000002 CEFAZOLIN IN DEXTROSE 2-4 GM/100ML-% SOLUTION: Performed by: ORTHOPAEDIC SURGERY

## 2022-03-18 PROCEDURE — 25010000002 ROPIVACAINE PER 1 MG: Performed by: ORTHOPAEDIC SURGERY

## 2022-03-18 PROCEDURE — 27130 TOTAL HIP ARTHROPLASTY: CPT | Performed by: ORTHOPAEDIC SURGERY

## 2022-03-18 PROCEDURE — 25010000002 KETOROLAC TROMETHAMINE PER 15 MG: Performed by: ORTHOPAEDIC SURGERY

## 2022-03-18 PROCEDURE — 99220 PR INITIAL OBSERVATION CARE/DAY 70 MINUTES: CPT | Performed by: INTERNAL MEDICINE

## 2022-03-18 PROCEDURE — 25010000002 MIDAZOLAM PER 1 MG: Performed by: ANESTHESIOLOGY

## 2022-03-18 PROCEDURE — 73502 X-RAY EXAM HIP UNI 2-3 VIEWS: CPT

## 2022-03-18 PROCEDURE — C1776 JOINT DEVICE (IMPLANTABLE): HCPCS | Performed by: ORTHOPAEDIC SURGERY

## 2022-03-18 PROCEDURE — 25010000002 MORPHINE (PF) 10 MG/ML SOLUTION: Performed by: ORTHOPAEDIC SURGERY

## 2022-03-18 PROCEDURE — 25010000002 PROPOFOL 10 MG/ML EMULSION: Performed by: NURSE ANESTHETIST, CERTIFIED REGISTERED

## 2022-03-18 PROCEDURE — 97161 PT EVAL LOW COMPLEX 20 MIN: CPT

## 2022-03-18 PROCEDURE — 0 MEPIVACAINE HCL (PF) 1.5 % SOLUTION: Performed by: NURSE ANESTHETIST, CERTIFIED REGISTERED

## 2022-03-18 DEVICE — SCRW ACET CORT TRILOGY S/TAP 6.5X25: Type: IMPLANTABLE DEVICE | Site: HIP | Status: FUNCTIONAL

## 2022-03-18 DEVICE — HD FEM/HIP G7 BIOLOX/DELTA OPTN 36MM: Type: IMPLANTABLE DEVICE | Site: HIP | Status: FUNCTIONAL

## 2022-03-18 DEVICE — SHLL ACET G7 PPS LTD/HL TI SZF 54MM: Type: IMPLANTABLE DEVICE | Site: HIP | Status: FUNCTIONAL

## 2022-03-18 DEVICE — LINER ACET G7 VIVACIT/E NTRL HXPE SZF 36MM: Type: IMPLANTABLE DEVICE | Site: HIP | Status: FUNCTIONAL

## 2022-03-18 DEVICE — TOTAL HIP PRIMARY: Type: IMPLANTABLE DEVICE | Site: HIP | Status: FUNCTIONAL

## 2022-03-18 DEVICE — ADAPT HIP BIOLOX OPTN TYPE1 TPR STD: Type: IMPLANTABLE DEVICE | Site: HIP | Status: FUNCTIONAL

## 2022-03-18 DEVICE — IMPLANTABLE DEVICE: Type: IMPLANTABLE DEVICE | Site: HIP | Status: FUNCTIONAL

## 2022-03-18 RX ORDER — TRANEXAMIC ACID 10 MG/ML
1000 INJECTION, SOLUTION INTRAVENOUS ONCE
Status: COMPLETED | OUTPATIENT
Start: 2022-03-18 | End: 2022-03-18

## 2022-03-18 RX ORDER — PHENYLEPHRINE HCL IN 0.9% NACL 1 MG/10 ML
SYRINGE (ML) INTRAVENOUS AS NEEDED
Status: DISCONTINUED | OUTPATIENT
Start: 2022-03-18 | End: 2022-03-18

## 2022-03-18 RX ORDER — ACETAMINOPHEN 500 MG
1000 TABLET ORAL EVERY 8 HOURS
Status: DISCONTINUED | OUTPATIENT
Start: 2022-03-18 | End: 2022-03-19 | Stop reason: HOSPADM

## 2022-03-18 RX ORDER — CEFAZOLIN SODIUM 2 G/100ML
2 INJECTION, SOLUTION INTRAVENOUS ONCE
Status: COMPLETED | OUTPATIENT
Start: 2022-03-18 | End: 2022-03-18

## 2022-03-18 RX ORDER — SODIUM CHLORIDE, SODIUM LACTATE, POTASSIUM CHLORIDE, CALCIUM CHLORIDE 600; 310; 30; 20 MG/100ML; MG/100ML; MG/100ML; MG/100ML
100 INJECTION, SOLUTION INTRAVENOUS CONTINUOUS
Status: DISCONTINUED | OUTPATIENT
Start: 2022-03-18 | End: 2022-03-19 | Stop reason: HOSPADM

## 2022-03-18 RX ORDER — ACETAMINOPHEN 500 MG
1000 TABLET ORAL ONCE
Status: DISCONTINUED | OUTPATIENT
Start: 2022-03-18 | End: 2022-03-18

## 2022-03-18 RX ORDER — BISACODYL 10 MG
10 SUPPOSITORY, RECTAL RECTAL DAILY PRN
Status: DISCONTINUED | OUTPATIENT
Start: 2022-03-18 | End: 2022-03-19 | Stop reason: HOSPADM

## 2022-03-18 RX ORDER — PROMETHAZINE HYDROCHLORIDE 12.5 MG/1
25 TABLET ORAL ONCE AS NEEDED
Status: DISCONTINUED | OUTPATIENT
Start: 2022-03-18 | End: 2022-03-18 | Stop reason: HOSPADM

## 2022-03-18 RX ORDER — SODIUM CHLORIDE 0.9 % (FLUSH) 0.9 %
10 SYRINGE (ML) INJECTION AS NEEDED
Status: DISCONTINUED | OUTPATIENT
Start: 2022-03-18 | End: 2022-03-19 | Stop reason: HOSPADM

## 2022-03-18 RX ORDER — SODIUM CHLORIDE, SODIUM LACTATE, POTASSIUM CHLORIDE, CALCIUM CHLORIDE 600; 310; 30; 20 MG/100ML; MG/100ML; MG/100ML; MG/100ML
9 INJECTION, SOLUTION INTRAVENOUS CONTINUOUS PRN
Status: DISCONTINUED | OUTPATIENT
Start: 2022-03-18 | End: 2022-03-19 | Stop reason: HOSPADM

## 2022-03-18 RX ORDER — OXYCODONE HYDROCHLORIDE 5 MG/1
5 TABLET ORAL
Status: DISCONTINUED | OUTPATIENT
Start: 2022-03-18 | End: 2022-03-18 | Stop reason: HOSPADM

## 2022-03-18 RX ORDER — KETOROLAC TROMETHAMINE 30 MG/ML
15 INJECTION, SOLUTION INTRAMUSCULAR; INTRAVENOUS EVERY 6 HOURS SCHEDULED
Status: COMPLETED | OUTPATIENT
Start: 2022-03-18 | End: 2022-03-19

## 2022-03-18 RX ORDER — CEFAZOLIN SODIUM 2 G/100ML
2 INJECTION, SOLUTION INTRAVENOUS EVERY 8 HOURS
Status: COMPLETED | OUTPATIENT
Start: 2022-03-18 | End: 2022-03-19

## 2022-03-18 RX ORDER — ACETAMINOPHEN 325 MG/1
325 TABLET ORAL EVERY 4 HOURS PRN
Status: DISCONTINUED | OUTPATIENT
Start: 2022-03-18 | End: 2022-03-19 | Stop reason: HOSPADM

## 2022-03-18 RX ORDER — SODIUM CHLORIDE 0.9 % (FLUSH) 0.9 %
10 SYRINGE (ML) INJECTION EVERY 12 HOURS SCHEDULED
Status: DISCONTINUED | OUTPATIENT
Start: 2022-03-18 | End: 2022-03-19 | Stop reason: HOSPADM

## 2022-03-18 RX ORDER — MEPERIDINE HYDROCHLORIDE 25 MG/ML
12.5 INJECTION INTRAMUSCULAR; INTRAVENOUS; SUBCUTANEOUS
Status: DISCONTINUED | OUTPATIENT
Start: 2022-03-18 | End: 2022-03-18 | Stop reason: HOSPADM

## 2022-03-18 RX ORDER — EPHEDRINE SULFATE 50 MG/ML
INJECTION, SOLUTION INTRAVENOUS AS NEEDED
Status: DISCONTINUED | OUTPATIENT
Start: 2022-03-18 | End: 2022-03-18 | Stop reason: SURG

## 2022-03-18 RX ORDER — CELECOXIB 100 MG/1
200 CAPSULE ORAL ONCE
Status: COMPLETED | OUTPATIENT
Start: 2022-03-18 | End: 2022-03-18

## 2022-03-18 RX ORDER — FAMOTIDINE 20 MG/1
40 TABLET, FILM COATED ORAL DAILY
Status: DISCONTINUED | OUTPATIENT
Start: 2022-03-18 | End: 2022-03-19 | Stop reason: HOSPADM

## 2022-03-18 RX ORDER — TRANEXAMIC ACID 10 MG/ML
1000 INJECTION, SOLUTION INTRAVENOUS
Status: COMPLETED | OUTPATIENT
Start: 2022-03-19 | End: 2022-03-18

## 2022-03-18 RX ORDER — SODIUM CHLORIDE 0.9 % (FLUSH) 0.9 %
10 SYRINGE (ML) INJECTION AS NEEDED
Status: DISCONTINUED | OUTPATIENT
Start: 2022-03-18 | End: 2022-03-18 | Stop reason: HOSPADM

## 2022-03-18 RX ORDER — CEFAZOLIN SODIUM IN 0.9 % NACL 3 G/100 ML
3 INTRAVENOUS SOLUTION, PIGGYBACK (ML) INTRAVENOUS ONCE
Status: DISCONTINUED | OUTPATIENT
Start: 2022-03-18 | End: 2022-03-18 | Stop reason: ALTCHOICE

## 2022-03-18 RX ORDER — LIDOCAINE HYDROCHLORIDE 20 MG/ML
INJECTION, SOLUTION INFILTRATION; PERINEURAL AS NEEDED
Status: DISCONTINUED | OUTPATIENT
Start: 2022-03-18 | End: 2022-03-18 | Stop reason: SURG

## 2022-03-18 RX ORDER — PROMETHAZINE HYDROCHLORIDE 25 MG/1
25 SUPPOSITORY RECTAL ONCE AS NEEDED
Status: DISCONTINUED | OUTPATIENT
Start: 2022-03-18 | End: 2022-03-18 | Stop reason: HOSPADM

## 2022-03-18 RX ORDER — MAGNESIUM HYDROXIDE 1200 MG/15ML
LIQUID ORAL AS NEEDED
Status: DISCONTINUED | OUTPATIENT
Start: 2022-03-18 | End: 2022-03-18 | Stop reason: HOSPADM

## 2022-03-18 RX ORDER — ONDANSETRON 2 MG/ML
4 INJECTION INTRAMUSCULAR; INTRAVENOUS EVERY 6 HOURS PRN
Status: DISCONTINUED | OUTPATIENT
Start: 2022-03-18 | End: 2022-03-19 | Stop reason: HOSPADM

## 2022-03-18 RX ORDER — PROMETHAZINE HYDROCHLORIDE 12.5 MG/1
12.5 TABLET ORAL EVERY 6 HOURS PRN
Status: DISCONTINUED | OUTPATIENT
Start: 2022-03-18 | End: 2022-03-19 | Stop reason: HOSPADM

## 2022-03-18 RX ORDER — ONDANSETRON 4 MG/1
4 TABLET, FILM COATED ORAL EVERY 6 HOURS PRN
Status: DISCONTINUED | OUTPATIENT
Start: 2022-03-18 | End: 2022-03-19 | Stop reason: HOSPADM

## 2022-03-18 RX ORDER — NALOXONE HCL 0.4 MG/ML
0.4 VIAL (ML) INJECTION
Status: DISCONTINUED | OUTPATIENT
Start: 2022-03-18 | End: 2022-03-19 | Stop reason: HOSPADM

## 2022-03-18 RX ORDER — MIDAZOLAM HYDROCHLORIDE 1 MG/ML
2 INJECTION INTRAMUSCULAR; INTRAVENOUS ONCE
Status: COMPLETED | OUTPATIENT
Start: 2022-03-18 | End: 2022-03-18

## 2022-03-18 RX ORDER — HYDROCODONE BITARTRATE AND ACETAMINOPHEN 7.5; 325 MG/1; MG/1
2 TABLET ORAL EVERY 4 HOURS PRN
Status: DISCONTINUED | OUTPATIENT
Start: 2022-03-18 | End: 2022-03-19 | Stop reason: HOSPADM

## 2022-03-18 RX ORDER — AMOXICILLIN 250 MG
2 CAPSULE ORAL 2 TIMES DAILY
Status: DISCONTINUED | OUTPATIENT
Start: 2022-03-18 | End: 2022-03-19 | Stop reason: HOSPADM

## 2022-03-18 RX ORDER — HYDROCODONE BITARTRATE AND ACETAMINOPHEN 7.5; 325 MG/1; MG/1
1 TABLET ORAL EVERY 4 HOURS PRN
Status: DISCONTINUED | OUTPATIENT
Start: 2022-03-18 | End: 2022-03-19 | Stop reason: HOSPADM

## 2022-03-18 RX ORDER — PROMETHAZINE HYDROCHLORIDE 12.5 MG/1
12.5 SUPPOSITORY RECTAL EVERY 6 HOURS PRN
Status: DISCONTINUED | OUTPATIENT
Start: 2022-03-18 | End: 2022-03-19 | Stop reason: HOSPADM

## 2022-03-18 RX ORDER — ONDANSETRON 2 MG/ML
4 INJECTION INTRAMUSCULAR; INTRAVENOUS ONCE AS NEEDED
Status: DISCONTINUED | OUTPATIENT
Start: 2022-03-18 | End: 2022-03-18 | Stop reason: HOSPADM

## 2022-03-18 RX ADMIN — KETOROLAC TROMETHAMINE 15 MG: 30 INJECTION, SOLUTION INTRAMUSCULAR; INTRAVENOUS at 17:11

## 2022-03-18 RX ADMIN — CELECOXIB 200 MG: 100 CAPSULE ORAL at 09:00

## 2022-03-18 RX ADMIN — EPHEDRINE SULFATE 5 MG: 50 INJECTION INTRAVENOUS at 11:20

## 2022-03-18 RX ADMIN — CEFAZOLIN SODIUM 2 G: 2 INJECTION, SOLUTION INTRAVENOUS at 17:11

## 2022-03-18 RX ADMIN — EPHEDRINE SULFATE 5 MG: 50 INJECTION INTRAVENOUS at 11:15

## 2022-03-18 RX ADMIN — ACETAMINOPHEN 1000 MG: 500 TABLET ORAL at 23:15

## 2022-03-18 RX ADMIN — SODIUM CHLORIDE, POTASSIUM CHLORIDE, SODIUM LACTATE AND CALCIUM CHLORIDE: 600; 310; 30; 20 INJECTION, SOLUTION INTRAVENOUS at 11:20

## 2022-03-18 RX ADMIN — PROPOFOL 190 MCG/KG/MIN: 10 INJECTION, EMULSION INTRAVENOUS at 10:30

## 2022-03-18 RX ADMIN — CEFAZOLIN SODIUM 2 G: 2 INJECTION, SOLUTION INTRAVENOUS at 10:20

## 2022-03-18 RX ADMIN — KETOROLAC TROMETHAMINE 15 MG: 30 INJECTION, SOLUTION INTRAMUSCULAR; INTRAVENOUS at 23:15

## 2022-03-18 RX ADMIN — EPHEDRINE SULFATE 10 MG: 50 INJECTION INTRAVENOUS at 11:22

## 2022-03-18 RX ADMIN — EPHEDRINE SULFATE 10 MG: 50 INJECTION INTRAVENOUS at 11:43

## 2022-03-18 RX ADMIN — MEPIVACAINE HYDROCHLORIDE 3.5 ML: 15 INJECTION, SOLUTION EPIDURAL; INFILTRATION at 10:27

## 2022-03-18 RX ADMIN — FAMOTIDINE 40 MG: 20 TABLET ORAL at 14:49

## 2022-03-18 RX ADMIN — EPHEDRINE SULFATE 10 MG: 50 INJECTION INTRAVENOUS at 11:01

## 2022-03-18 RX ADMIN — TRANEXAMIC ACID 1000 MG: 10 INJECTION, SOLUTION INTRAVENOUS at 10:04

## 2022-03-18 RX ADMIN — LIDOCAINE HYDROCHLORIDE 100 MG: 20 INJECTION, SOLUTION INFILTRATION; PERINEURAL at 10:30

## 2022-03-18 RX ADMIN — MIDAZOLAM HYDROCHLORIDE 2 MG: 1 INJECTION, SOLUTION INTRAMUSCULAR; INTRAVENOUS at 10:04

## 2022-03-18 RX ADMIN — ACETAMINOPHEN 1000 MG: 500 TABLET ORAL at 14:49

## 2022-03-18 RX ADMIN — SENNOSIDES AND DOCUSATE SODIUM 2 TABLET: 50; 8.6 TABLET ORAL at 21:02

## 2022-03-18 RX ADMIN — SODIUM CHLORIDE, POTASSIUM CHLORIDE, SODIUM LACTATE AND CALCIUM CHLORIDE 9 ML/HR: 600; 310; 30; 20 INJECTION, SOLUTION INTRAVENOUS at 12:46

## 2022-03-18 RX ADMIN — TRANEXAMIC ACID 1000 MG: 10 INJECTION, SOLUTION INTRAVENOUS at 12:00

## 2022-03-18 RX ADMIN — EPHEDRINE SULFATE 10 MG: 50 INJECTION INTRAVENOUS at 11:08

## 2022-03-18 RX ADMIN — EPHEDRINE SULFATE 5 MG: 50 INJECTION INTRAVENOUS at 11:37

## 2022-03-18 RX ADMIN — EPHEDRINE SULFATE 5 MG: 50 INJECTION INTRAVENOUS at 11:35

## 2022-03-18 RX ADMIN — SODIUM CHLORIDE, POTASSIUM CHLORIDE, SODIUM LACTATE AND CALCIUM CHLORIDE 9 ML/HR: 600; 310; 30; 20 INJECTION, SOLUTION INTRAVENOUS at 09:00

## 2022-03-18 RX ADMIN — SODIUM CHLORIDE, POTASSIUM CHLORIDE, SODIUM LACTATE AND CALCIUM CHLORIDE 100 ML/HR: 600; 310; 30; 20 INJECTION, SOLUTION INTRAVENOUS at 21:09

## 2022-03-18 NOTE — PROGRESS NOTES
Chief Complaint  No chief complaint on file.    Subjective          Deacon Neil presents to [facility] for follow-up on right hip, history of right hip osteoarthritis.  Patient does have a history of bilateral hip osteoarthritis, right worse than left.  At last visit he was given an intramuscular injection and started doing physical therapy which was greatly helping with the pain.  He states that heat and cold treatments at the end of therapy were beneficial in helping with pain.  He is using a cane for ambulation today but states whenever he was doing physical therapy he was able to discontinue the use of the cane.  Takes tramadol and Tylenol for pain relief.  He states he had to stop going to therapy because he has developed rectal bleeding with bowel movements.  He was told by his doctor to avoid heavy strenuous activity at this time.  He plans to follow-up with RIVAS Barker for further evaluation.  He states pain is worsening, he is walking with a limp and back on the cane and he would like to talk about surgical intervention.  He does have a history of lumbar spine L4-L5 disease and symptoms of sciatica which she sees Dr. Xie for.    Objective   Allergies   Allergen Reactions   • Pseudoephedrine Anaphylaxis   • Lisinopril Cough       Vital Signs:   There were no vitals taken for this visit.      Physical Exam  Constitutional:       Appearance: Normal appearance. Patient is well-developed and normal weight.   HENT:      Head: Normocephalic.      Right Ear: Hearing and external ear normal.      Left Ear: Hearing and external ear normal.      Nose: Nose normal.   Eyes:      Conjunctiva/sclera: Conjunctivae normal.   Cardiovascular:      Rate and Rhythm: Normal rate.   Pulmonary:      Effort: Pulmonary effort is normal.      Breath sounds: No wheezing or rales.   Abdominal:      Palpations: Abdomen is soft.      Tenderness: There is no abdominal tenderness.   Musculoskeletal:       Cervical back: Normal range of motion.   Skin:     Findings: No rash.   Neurological:      Mental Status: Patient is alert and oriented to person, place, and time.   Psychiatric:         Mood and Affect: Mood and affect normal.         Judgment: Judgment normal.     Ortho Exam  Right hip: Skin intact, no swelling, tenderness to the groin, pain with internal and external rotation, gait antalgic, walks with a limp while using the cane.  Good range of motion right knee ankle and digits.  Sensations intact in posterior tib pulses 2+, limited hip flexion and abduction due to pain and stiffness.  Result Review :            Imaging Results (Most Recent)     None                Assessment and Plan    Problem List Items Addressed This Visit    None         Follow Up   Post op     Risks and benefits of R total hip arthroplasty were explained and patient wishes to proceed

## 2022-03-18 NOTE — ANESTHESIA PREPROCEDURE EVALUATION
Anesthesia Evaluation     Patient summary reviewed and Nursing notes reviewed   no history of anesthetic complications:  NPO Solid Status: > 8 hours  NPO Liquid Status: > 2 hours           Airway   Mallampati: II  TM distance: >3 FB  Neck ROM: full  No difficulty expected  Dental      Pulmonary - negative pulmonary ROS and normal exam    breath sounds clear to auscultation  Cardiovascular - normal exam  Exercise tolerance: good (4-7 METS)    Rhythm: regular  Rate: normal    (+) hypertension, CAD, hyperlipidemia,       Neuro/Psych- negative ROS  GI/Hepatic/Renal/Endo    (+)  GI bleeding upper ,     Musculoskeletal     (+) back pain,   Abdominal    Substance History - negative use     OB/GYN negative ob/gyn ROS         Other - negative ROS                       Anesthesia Plan    ASA 3     spinal   (Patient understands anesthesia not responsible for dental damage.)  intravenous induction     Anesthetic plan, all risks, benefits, and alternatives have been provided, discussed and informed consent has been obtained with: patient.    Plan discussed with CRNA.        CODE STATUS:

## 2022-03-18 NOTE — PLAN OF CARE
Goal Outcome Evaluation:         Pt evaluated and deficits noted with strength, bed mobility, transfers, and ambulation. Inpatient physical therapy recommended to address deficits. Recommend outpatient services upon d/c home from hospital.

## 2022-03-18 NOTE — SIGNIFICANT NOTE
03/18/22 1618   Plan   Final Discharge Disposition Code 06 - home with home health care   [facility]

## 2022-03-18 NOTE — OP NOTE
TOTAL HIP ARTHROPLASTY ANTERIOR  Procedure Report    Patient Name:  Deacon Neil  YOB: 1945    Date of Surgery:  3/18/2022     Indications:  Advanced hip arthritis, failed conservative care    Pre-op Diagnosis:   Primary osteoarthritis of right hip [M16.11]       Post-Op Diagnosis Codes:     * Primary osteoarthritis of right hip [M16.11]    Procedure/CPT® Codes:      Procedure(s):  TOTAL HIP ARTHROPLASTY ANTERIOR right    Staff:  Surgeon(s):  Bhavesh Anderson MD    Assistant: Sonal Zepeda RN    Anesthesia: Choice    Estimated Blood Loss: 100ml    Implants:    Implant Name Type Inv. Item Serial No.  Lot No. LRB No. Used Action   SHLL ACET G7 PPS SZF 54MM - [id number] Implant SHLL ACET G7 PPS SZF 54MM  [employer] [id number] Right 1 Implanted   LINER ACET G7 VIVACIT/E NTRL HXPE SZF 36MM - [id number] Implant LINER ACET G7 VIVACIT/E NTRL HXPE SZF 36MM  [employer] [id number] Right 1 Implanted   HD FEM/HIP G7 BIOLOX/DELTA OPTN 36MM - [id number] Implant HD FEM/HIP G7 BIOLOX/DELTA OPTN 36MM  [employer] [id number] Right 1 Implanted   SCRW ACET MICHAEL TRILOGY S/TAP 6.5X25 - [id number] Implant SCRW ACET MICHAEL TRILOGY S/TAP 6.5X25  [employer] [id number] Right 1 Implanted   STEM FEM/HIP TAPERLOC COMPL MICROPLASTY HI/OFFST SZ12 - [id number] Implant STEM FEM/HIP TAPERLOC COMPL MICROPLASTY HI/OFFST SZ12  [employer] [id number] Right 1 Implanted   ADAPT HIP BIOLOX OPTN TYPE1 TPR STD - [id number] Implant ADAPT HIP BIOLOX OPTN TYPE1 TPR STD  [employer] [id number] Right 1 Implanted       Specimen:          None        Findings: Advanced arthritis    Complications:  None    Description of Procedure: The patient went to the operating room and  underwent anesthesia, received a preoperative antibiotic, was placed on the [place] table and was then prepped and draped in standard fashion. A standard anterior hip incision was made. The interval was found and retractors were placed. The capsule was then opened.  The femoral neck was identified. Retractors were placed around the neck. The femoral neck cut was then made and then the head was removed from the acetabulum. Acetabular retractors were then placed. The labrum was removed. C-arm fluoroscopy was used to help guide the reaming for the acetabulum. This was sequentially reamed and then the appropriate size shell was then inserted, followed by a screw and then the  liner. The bed was raised, the leg was dropped, and femoral exposure was obtained. This was then opened using a rat-tail reamer and then sequentially broached  and then a stem was inserted. Leg lengths were measured after reduction and a ceramic head was then chosen. This was then thoroughly irrigated, tranexamic acid and local were injected, and then closed using #1 Vicryl, 2-0 Vicryl and staples. Sterile dressing was applied. The patient was then taken to recovery in stable condition. There were no complications.    Assistant: Sonal Zepeda, LUDIN  was responsible for performing the following activities: Retraction, Suction, Irrigation, Closing and Placing Dressing and their skilled assistance was necessary for the success of this case.    Bhavesh Anderson MD     Date: 3/18/2022  Time: 12:12 EDT

## 2022-03-18 NOTE — H&P (VIEW-ONLY)
Chief Complaint  No chief complaint on file.    Subjective          Deacon Neil presents to [facility] for follow-up on right hip, history of right hip osteoarthritis.  Patient does have a history of bilateral hip osteoarthritis, right worse than left.  At last visit he was given an intramuscular injection and started doing physical therapy which was greatly helping with the pain.  He states that heat and cold treatments at the end of therapy were beneficial in helping with pain.  He is using a cane for ambulation today but states whenever he was doing physical therapy he was able to discontinue the use of the cane.  Takes tramadol and Tylenol for pain relief.  He states he had to stop going to therapy because he has developed rectal bleeding with bowel movements.  He was told by his doctor to avoid heavy strenuous activity at this time.  He plans to follow-up with RIVAS Barker for further evaluation.  He states pain is worsening, he is walking with a limp and back on the cane and he would like to talk about surgical intervention.  He does have a history of lumbar spine L4-L5 disease and symptoms of sciatica which she sees Dr. Xie for.    Objective   Allergies   Allergen Reactions   • Pseudoephedrine Anaphylaxis   • Lisinopril Cough       Vital Signs:   There were no vitals taken for this visit.      Physical Exam  Constitutional:       Appearance: Normal appearance. Patient is well-developed and normal weight.   HENT:      Head: Normocephalic.      Right Ear: Hearing and external ear normal.      Left Ear: Hearing and external ear normal.      Nose: Nose normal.   Eyes:      Conjunctiva/sclera: Conjunctivae normal.   Cardiovascular:      Rate and Rhythm: Normal rate.   Pulmonary:      Effort: Pulmonary effort is normal.      Breath sounds: No wheezing or rales.   Abdominal:      Palpations: Abdomen is soft.      Tenderness: There is no abdominal tenderness.   Musculoskeletal:       Cervical back: Normal range of motion.   Skin:     Findings: No rash.   Neurological:      Mental Status: Patient is alert and oriented to person, place, and time.   Psychiatric:         Mood and Affect: Mood and affect normal.         Judgment: Judgment normal.     Ortho Exam  Right hip: Skin intact, no swelling, tenderness to the groin, pain with internal and external rotation, gait antalgic, walks with a limp while using the cane.  Good range of motion right knee ankle and digits.  Sensations intact in posterior tib pulses 2+, limited hip flexion and abduction due to pain and stiffness.  Result Review :            Imaging Results (Most Recent)     None                Assessment and Plan    Problem List Items Addressed This Visit    None         Follow Up   Post op     Risks and benefits of R total hip arthroplasty were explained and patient wishes to proceed

## 2022-03-18 NOTE — THERAPY EVALUATION
Acute Care - Physical Therapy Initial Evaluation   Rickey     Patient Name: Deacon Neil  : 1945  MRN: [id number]  Today's Date: 3/18/2022     Admit date: 3/18/2022     Referring Physician: Bhavesh Anderson MD     Surgery Date:3/18/2022   Procedure(s) (LRB):  TOTAL HIP ARTHROPLASTY ANTERIOR right (Right)        Visit Dx:     ICD-10-CM ICD-9-CM   1. Difficulty walking  R26.2 719.7   2. Primary osteoarthritis of right hip  M16.11 715.15     Patient Active Problem List   Diagnosis   • Bladder tumor   • BPH (benign prostatic hyperplasia)   • Psoriasis   • Coronary artery disease involving native heart without angina pectoris   • Hyperlipidemia   • Hypertension, essential   • OA (osteoarthritis) of hip   • Altered bowel habits   • Constipation   • Rectal bleeding   • Screening for colon cancer     Past Medical History:   Diagnosis Date   • Anesthesia complication     heart rate dropped    • Arthritis     hips   • Back pain    • Bladder tumor     MYOFIBROBLASTIC TUMOR   • BPH (benign prostatic hyperplasia)    • Coronary artery disease involving native heart without angina pectoris 2013     Coronary artery disease. Cardiac catheterization on 2013 showed 80-85% stenosis of the mid LAD, 70-75% lesion of the diagonal 1 branch, and 40% stenosis of the proximal left circumflex artery. Medical management was advised at that point;    • Hyperlipidemia    • Hypertension, essential    • Psoriasis      Past Surgical History:   Procedure Laterality Date   • CATARACT EXTRACTION     • CIRCUMCISION     • COLONOSCOPY N/A 2/15/2022    Procedure: COLONOSCOPY;  Surgeon: Roni Pedraza MD;  Location: [facility] ENDOSCOPY;  Service: Gastroenterology;  Laterality: N/A;  HEMORRHOIDS  DIVERTICULOSIS   • CYSTOSCOPY     • TONSILLECTOMY     • VASECTOMY       PT Assessment (last 12 hours)     PT Evaluation and Treatment     Row Name 22 1358          Physical Therapy Time and Intention    Subjective Information  pain  -DP     Document Type evaluation  -DP     Mode of Treatment individual therapy;physical therapy  -DP     Patient Effort good  -DP     Symptoms Noted During/After Treatment none  -DP     Row Name 03/18/22 1358          General Information    Patient Profile Reviewed yes  -DP     Patient Observations alert;cooperative;agree to therapy  -DP     Prior Level of Function independent:;all household mobility;community mobility;ADL's  -DP     Equipment Currently Used at Home none  Recent use of SPC due to R hip pain  -DP     Existing Precautions/Restrictions fall;hip, anterior;weight bearing  WBAT  -DP     Barriers to Rehab none identified  -DP     Row Name 03/18/22 1358          Living Environment    Current Living Arrangements home  -DP     Home Accessibility stairs to enter home  -DP     People in Home spouse  -DP     Primary Care Provided by self  -DP     Row Name 03/18/22 1358          Home Main Entrance    Number of Stairs, Main Entrance four  -DP     Stair Railings, Main Entrance railings on both sides of stairs  -DP     Row Name 03/18/22 1358          Pain    Pretreatment Pain Rating 3/10  -DP     Posttreatment Pain Rating 3/10  -DP     Pain Location - Side/Orientation Right  -DP     Pain Location lateral  -DP     Pain Location - hip  -DP     Pain Intervention(s) Nursing Notified  -DP     Row Name 03/18/22 1358          Cognition    Orientation Status (Cognition) oriented x 3  -DP     Follows Commands (Cognition) WFL  -DP     Row Name 03/18/22 1358          Range of Motion (ROM)    Range of Motion bilateral lower extremities;ROM is WFL  -DP     Row Name 03/18/22 1358          Strength (Manual Muscle Testing)    Strength (Manual Muscle Testing) right lower extremity strength  -DP     Right Lower Extremity Strength right LE strength is WFL except;hip  -DP     Hip, Right (Strength) 3+/5  -DP     Row Name 03/18/22 1358          Mobility    Extremity Weight-bearing Status right lower extremity  -DP     Right Lower  Extremity (Weight-bearing Status) weight-bearing as tolerated (WBAT)  -DP     Row Name 03/18/22 1358          Bed Mobility    Bed Mobility supine-sit  -DP     Supine-Sit Grady (Bed Mobility) maximum assist (25% patient effort);1 person assist;verbal cues  -DP     Bed Mobility, Safety Issues decreased use of arms for pushing/pulling;decreased use of legs for bridging/pushing  -DP     Assistive Device (Bed Mobility) head of bed elevated  -DP     Row Name 03/18/22 1358          Transfers    Transfers sit-stand transfer;bed-chair transfer  -DP     Maintains Weight-bearing Status (Transfers) able to maintain  -DP     Bed-Chair Grady (Transfers) contact guard;1 person assist;verbal cues  -DP     Assistive Device (Bed-Chair Transfers) walker, front-wheeled  -DP     Sit-Stand Grady (Transfers) minimum assist (75% patient effort);1 person assist;verbal cues  -DP     Row Name 03/18/22 135          Sit-Stand Transfer    Assistive Device (Sit-Stand Transfers) walker, front-wheeled  -DP     Row Name 03/18/22 1354          Gait/Stairs (Locomotion)    Gait/Stairs Locomotion gait/ambulation independence;gait/ambulation assistive device;distance ambulated;gait pattern;gait deviations  -DP     Grady Level (Gait) contact guard  -DP     Assistive Device (Gait) walker, front-wheeled  -DP     Distance in Feet (Gait) 20  -DP     Pattern (Gait) 4-point;step-to  -DP     Deviations/Abnormal Patterns (Gait) right sided deviations;antalgic;carolin decreased;gait speed decreased;stride length decreased;weight shifting decreased  -DP     Right Sided Gait Deviations heel strike decreased;weight shift ability decreased  -DP     Row Name 03/18/22 1350          Safety Issues, Functional Mobility    Impairments Affecting Function (Mobility) balance;endurance/activity tolerance;pain;strength  -DP     Row Name 03/18/22 1358          Balance    Balance Assessment standing dynamic balance  -DP     Dynamic Standing Balance  contact guard  -DP     Position/Device Used, Standing Balance walker, front-wheeled  -DP     Row Name             Wound 03/18/22 1105 Right anterior hip Incision    Wound - Properties Group Placement Date: 03/18/22  -AM Placement Time: 1105  -AM Side: Right  -AM Orientation: anterior  -AM Location: hip  -AM Primary Wound Type: Incision  -AM     Retired Wound - Properties Group Placement Date: 03/18/22  -AM Placement Time: 1105  -AM Side: Right  -AM Orientation: anterior  -AM Location: hip  -AM Primary Wound Type: Incision  -AM     Retired Wound - Properties Group Date first assessed: 03/18/22  -AM Time first assessed: 1105  -AM Side: Right  -AM Location: hip  -AM Primary Wound Type: Incision  -AM     Row Name 03/18/22 1358          Vital Signs    O2 Delivery Pre Treatment room air  -DP     O2 Delivery Intra Treatment room air  -DP     O2 Delivery Post Treatment room air  -DP     Row Name 03/18/22 1353          Positioning and Restraints    Post Treatment Position chair  -DP     In Chair reclined;with family/caregiver;with nsg  -DP     Row Name 03/18/22 9767          Therapy Assessment/Plan (PT)    Patient/Family Therapy Goals Statement (PT) Pt wants to resume normal activity  -DP     Rehab Potential (PT) good, to achieve stated therapy goals  -DP     Criteria for Skilled Interventions Met (PT) yes;skilled treatment is necessary  -DP     Predicted Duration of Therapy Intervention (PT) 10 days  -DP     Problem List (PT) problems related to;balance;mobility;strength;pain  -DP     Activity Limitations Related to Problem List (PT) unable to ambulate safely;unable to transfer safely  -DP     Row Name 03/18/22 0750          Physical Therapy Goals    Bed Mobility Goal Selection (PT) bed mobility, PT goal 1  -DP     Transfer Goal Selection (PT) transfer, PT goal 1  -DP     Gait Training Goal Selection (PT) gait training, PT goal 1  -DP     Row Name 03/18/22 2645          Bed Mobility Goal 1 (PT)    Activity/Assistive Device  (Bed Mobility Goal 1, PT) bed mobility activities, all  -DP     Iola Level/Cues Needed (Bed Mobility Goal 1, PT) independent  -DP     Time Frame (Bed Mobility Goal 1, PT) 10 days  -DP     Row Name 03/18/22 1358          Transfer Goal 1 (PT)    Activity/Assistive Device (Transfer Goal 1, PT) transfers, all;walker, rolling  -DP     Iola Level/Cues Needed (Transfer Goal 1, PT) modified independence  -DP     Time Frame (Transfer Goal 1, PT) 10 days  -DP     Row Name 03/18/22 1358          Gait Training Goal 1 (PT)    Activity/Assistive Device (Gait Training Goal 1, PT) gait (walking locomotion);assistive device use;walker, rolling  -DP     Iola Level (Gait Training Goal 1, PT) modified independence  -DP     Distance (Gait Training Goal 1, PT) 200  -DP     Time Frame (Gait Training Goal 1, PT) 10 days  -DP           User Key  (r) = Recorded By, (t) = Taken By, (c) = Cosigned By    Initials Name Provider Type    AM Dorothy Craven RN Registered Nurse    Ally Kelley, PT Physical Therapist                Physical Therapy Education                 Title: PT OT SLP Therapies (Done)     Topic: Physical Therapy (Done)     Point: Mobility training (Done)     Learning Progress Summary           Patient Acceptance, E, VU by DP at 3/18/2022 1408   Significant Other Acceptance, E, VU by DP at 3/18/2022 1408                               User Key     Initials Effective Dates Name Provider Type Discipline    DP 06/03/21 -  Ally Moss, PT Physical Therapist PT              PT Recommendation and Plan  Anticipated Discharge Disposition (PT): home with outpatient therapy services  Planned Therapy Interventions (PT): balance training, bed mobility training, gait training, home exercise program, patient/family education, stair training, strengthening, transfer training  Therapy Frequency (PT): 2 times/day      Outcome Measures     Row Name 03/18/22 1400             How much help from another person do you  currently need...    Turning from your back to your side while in flat bed without using bedrails? 3  -DP      Moving from lying on back to sitting on the side of a flat bed without bedrails? 2  -DP      Moving to and from a bed to a chair (including a wheelchair)? 3  -DP      Standing up from a chair using your arms (e.g., wheelchair, bedside chair)? 3  -DP      Climbing 3-5 steps with a railing? 2  -DP      To walk in hospital room? 3  -DP      AM-PAC 6 Clicks Score (PT) 16  -DP              Functional Assessment    Outcome Measure Options AM-PAC 6 Clicks Basic Mobility (PT)  -DP            User Key  (r) = Recorded By, (t) = Taken By, (c) = Cosigned By    Initials Name Provider Type    Ally Kelley, PT Physical Therapist                 Time Calculation:    PT Charges     Row Name 03/18/22 1358             Time Calculation    PT Received On 03/18/22  -DP      PT Goal Re-Cert Due Date 03/27/22  -DP              Untimed Charges    PT Eval/Re-eval Minutes 25  -DP              Total Minutes    Untimed Charges Total Minutes 25  -DP       Total Minutes 25  -DP            User Key  (r) = Recorded By, (t) = Taken By, (c) = Cosigned By    Initials Name Provider Type    Ally Kelley, PT Physical Therapist              Therapy Charges for Today     Code Description Service Date Service Provider Modifiers Qty    [id number] HC PT EVAL LOW COMPLEXITY 2 3/18/2022 Ally Moss, PT GP 1          PT G-Codes  Outcome Measure Options: AM-PAC 6 Clicks Basic Mobility (PT)  AM-PAC 6 Clicks Score (PT): 16    Ally Moss, PT  3/18/2022

## 2022-03-18 NOTE — PLAN OF CARE
Goal Outcome Evaluation:   VSS, pain management     Patient admitted to  post surgical Right total hip, wife present, oriented to room, call ilght in reach, pain controlled at current time.

## 2022-03-18 NOTE — ANESTHESIA POSTPROCEDURE EVALUATION
Patient: Deacon Neil    Procedure Summary     Date: 03/18/22 Room / Location: [facility] OR 01 / [facility] MAIN OR    Anesthesia Start: 1020 Anesthesia Stop: 1219    Procedure: TOTAL HIP ARTHROPLASTY ANTERIOR right (Right Hip) Diagnosis:       Primary osteoarthritis of right hip      (Primary osteoarthritis of right hip [M16.11])    Surgeons: Bhavesh Anderson MD Provider: Neptali Benavides MD    Anesthesia Type: general ASA Status: 3          Anesthesia Type: general    Vitals  Vitals Value Taken Time   /51 03/18/22 1246   Temp 36.4 °C (97.6 °F) 03/18/22 1215   Pulse 65 03/18/22 1251   Resp 18 03/18/22 1240   SpO2 96 % 03/18/22 1251   Vitals shown include unvalidated device data.        Post Anesthesia Care and Evaluation    Patient location during evaluation: bedside  Patient participation: complete - patient participated  Level of consciousness: awake  Pain management: adequate  Airway patency: patent  Anesthetic complications: No anesthetic complications  PONV Status: none  Cardiovascular status: acceptable and stable  Respiratory status: acceptable and room air  Hydration status: acceptable    Comments: An Anesthesiologist personally participated in the most demanding procedures (including induction and emergence if applicable) in the anesthesia plan, monitored the course of anesthesia administration at frequent intervals and remained physically present and available for immediate diagnosis and treatment of emergencies.

## 2022-03-18 NOTE — ANESTHESIA PROCEDURE NOTES
Spinal Block      Patient reassessed immediately prior to procedure    Patient location during procedure: OR  Start Time: 3/18/2022 10:27 AM  Indication:at surgeon's request and post-op pain management  Performed By  CRNA: Manisha Mars CRNA  Preanesthetic Checklist  Completed: patient identified, IV checked, risks and benefits discussed, surgical consent, monitors and equipment checked, pre-op evaluation and timeout performed  Spinal Block Prep:  Sterile Tech:cap, gloves, mask and sterile barriers  Prep:Chloraprep  Patient Monitoring:blood pressure monitoring, continuous pulse oximetry and EKG  Spinal Block Procedure  Approach:midline  Guidance:landmark technique and palpation technique  Location:L4-L5  Needle Type:Pencan  Needle Gauge:24 G  Placement of Spinal needle event:cerebrospinal fluid aspirated  Paresthesia: no  Fluid Appearance:clear     Post Assessment  Patient Tolerance:patient tolerated the procedure well with no apparent complications  Complications no  Additional Notes  Skin infiltration with Lidocaine 1%. Introducer inserted, followed by spinal needle. + CSF return. Intrathecal mepivacaine 3.5mL injected. Spinal needle/introducer removed. Site clean/dry/intact.

## 2022-03-18 NOTE — H&P
[facility] HISTORY AND PHYSICAL  Date: 3/18/2022   Patient Name: Deacon Neil  : 1945  MRN: [id number]  Primary Care Physician:  Ghazala Freitas APRN  Date of admission: 3/18/2022    Subjective   Subjective     Chief Complaint: Medical management inpatient postop right total hip arthroplasty    HPI:    Deacon Neil is a 76 y.o. male with past medical history of plaque psoriasis, DJD, hypertension, hyperlipidemia and hemorrhoidal rectal bleed..  Patient is seen postop after right total hip arthroplasty by Dr. Anderson.  Patient is complaining of pain 3/10.  No sore throat, nausea, vomiting or abdominal pain.  No chest pain or shortness of breath.  Patient stated his low back pain is flaring up.  Patient has seen Dr. Rafael Xie for his lumbar spinal disease.  Patient has seen Dr. Pedraza for rectal bleeding and diarrhea alternating with constipation.  He blames diarrhea on his psoriatic plaque medicine and constipation on his pain medicine.  Denies any numbness tingling in the toes.      Personal History     Past Medical History:  Past Medical History:   Diagnosis Date   • Anesthesia complication     heart rate dropped    • Arthritis     hips   • Back pain    • Bladder tumor     MYOFIBROBLASTIC TUMOR   • BPH (benign prostatic hyperplasia)    • Coronary artery disease involving native heart without angina pectoris 2013     Coronary artery disease. Cardiac catheterization on 2013 showed 80-85% stenosis of the mid LAD, 70-75% lesion of the diagonal 1 branch, and 40% stenosis of the proximal left circumflex artery. Medical management was advised at that point;    • Hyperlipidemia    • Hypertension, essential    • Psoriasis        Past Surgical History:  Past Surgical History:   Procedure Laterality Date   • CATARACT EXTRACTION     • CIRCUMCISION     • COLONOSCOPY N/A 2/15/2022    Procedure: COLONOSCOPY;  Surgeon: Roni Pedraza MD;  Location: [facility] ENDOSCOPY;   Service: Gastroenterology;  Laterality: N/A;  HEMORRHOIDS  DIVERTICULOSIS   • CYSTOSCOPY     • TONSILLECTOMY     • VASECTOMY         Family History:   family history includes Hypertension in his mother.    Social History:    reports that he quit smoking about 8 years ago. He has never used smokeless tobacco. He reports previous alcohol use. He reports that he does not use drugs.    Home Medications:  Hydrocortisone (Perianal), Secukinumab (300 MG Dose), Simethicone, acetaminophen, aspirin, coenzyme Q10, desloratadine, desonide, econazole nitrate, ezetimibe, guaiFENesin, metoprolol succinate XL, polyethylene glycol, rosuvastatin, traMADol, and valsartan-hydrochlorothiazide    Allergies:  Allergies   Allergen Reactions   • Pseudoephedrine Anaphylaxis   • Lisinopril Cough       Review of Systems   All systems were reviewed and negative except for: As per H&P.    Objective   Objective     Vitals:   Temp:  [97.3 °F (36.3 °C)-97.8 °F (36.6 °C)] 97.3 °F (36.3 °C)  Heart Rate:  [65-84] 74  Resp:  [16-20] 16  BP: (117-152)/(47-60) 152/55  Flow (L/min):  [2-3] 2    Physical Exam    Constitutional: Awake, alert, no acute distress   Eyes: Pupils equal, sclerae anicteric, no conjunctival injection   HENT: NCAT, mucous membranes moist   Neck: Supple, no thyromegaly, no lymphadenopathy, trachea midline   Respiratory: Clear to auscultation bilaterally, nonlabored respirations    Cardiovascular: RRR, no murmurs, rubs, or gallops, palpable pedal pulses bilaterally   Gastrointestinal: Positive bowel sounds, soft, nontender, nondistended   Musculoskeletal: No bilateral ankle edema, no clubbing or cyanosis to extremities.  Right hip incision dressed.  Restricted ankle motion right leg   Psychiatric: Appropriate affect, cooperative   Neurologic: Oriented x 3, strength symmetric in all extremities, Cranial Nerves grossly intact to confrontation, speech clear   Skin: Positive rashes     Result Review    Result Review:  I have personally  reviewed the results from the time of this admission to 3/18/2022 15:34 EDT and agree with these findings:  [x]  Laboratory  []  Microbiology  [x]  Radiology  []  EKG/Telemetry   []  Cardiology/Vascular   []  Pathology  [x]  Old records  [x]  Other: Medications      Assessment/Plan   Assessment / Plan     Assessment:  DJD of right hip s/p right total hip arthroplasty by Dr. Anderson on March 18.  Psoriatic plaque.  Hypertension.  Hyperlipidemia  L2-L3 lumbar spinal disease.  Hemorrhoids.  Stable    Plan:   As per orthopedic surgery.  Continue IV fluid  IV Toradol, oral and IV narcotics for pain control.  Ice and heating pad.  Doing blood work in the morning.  Hold home medications.  Bowel regimen.  PT OT.  Likely discharge home in a.m. if stable.         DVT prophylaxis:  Medical and mechanical DVT prophylaxis orders are present.  Lovenox    CODE STATUS:         Admission Status:  I believe this patient meets observation status.    Part of this note may be an electronic transcription/translation of spoken language to printed text using the Dragon Dictation System.     Electronically signed by Rik Aviles MD, 03/18/22, 3:34 PM EDT.

## 2022-03-19 VITALS
BODY MASS INDEX: 28.7 KG/M2 | HEART RATE: 83 BPM | RESPIRATION RATE: 18 BRPM | WEIGHT: 205.03 LBS | HEIGHT: 71 IN | OXYGEN SATURATION: 98 % | DIASTOLIC BLOOD PRESSURE: 47 MMHG | SYSTOLIC BLOOD PRESSURE: 108 MMHG | TEMPERATURE: 98.8 F

## 2022-03-19 LAB
ANION GAP SERPL CALCULATED.3IONS-SCNC: 10.2 MMOL/L (ref 5–15)
BUN SERPL-MCNC: 16 MG/DL (ref 8–23)
BUN/CREAT SERPL: 16.2 (ref 7–25)
CALCIUM SPEC-SCNC: 8.7 MG/DL (ref 8.6–10.5)
CHLORIDE SERPL-SCNC: 102 MMOL/L (ref 98–107)
CO2 SERPL-SCNC: 23.8 MMOL/L (ref 22–29)
CREAT SERPL-MCNC: 0.99 MG/DL (ref 0.76–1.27)
EGFRCR SERPLBLD CKD-EPI 2021: 78.5 ML/MIN/1.73
GLUCOSE SERPL-MCNC: 87 MG/DL (ref 65–99)
HCT VFR BLD AUTO: 35.7 % (ref 37.5–51)
HGB BLD-MCNC: 12 G/DL (ref 13–17.7)
POTASSIUM SERPL-SCNC: 3.7 MMOL/L (ref 3.5–5.2)
SODIUM SERPL-SCNC: 136 MMOL/L (ref 136–145)

## 2022-03-19 PROCEDURE — 97116 GAIT TRAINING THERAPY: CPT

## 2022-03-19 PROCEDURE — 97535 SELF CARE MNGMENT TRAINING: CPT

## 2022-03-19 PROCEDURE — 25010000002 CEFAZOLIN IN DEXTROSE 2-4 GM/100ML-% SOLUTION: Performed by: ORTHOPAEDIC SURGERY

## 2022-03-19 PROCEDURE — 97165 OT EVAL LOW COMPLEX 30 MIN: CPT

## 2022-03-19 PROCEDURE — 85014 HEMATOCRIT: CPT | Performed by: ORTHOPAEDIC SURGERY

## 2022-03-19 PROCEDURE — 94799 UNLISTED PULMONARY SVC/PX: CPT

## 2022-03-19 PROCEDURE — 99217 PR OBSERVATION CARE DISCHARGE MANAGEMENT: CPT | Performed by: INTERNAL MEDICINE

## 2022-03-19 PROCEDURE — 25010000002 ENOXAPARIN PER 10 MG: Performed by: ORTHOPAEDIC SURGERY

## 2022-03-19 PROCEDURE — 85018 HEMOGLOBIN: CPT | Performed by: ORTHOPAEDIC SURGERY

## 2022-03-19 PROCEDURE — 97530 THERAPEUTIC ACTIVITIES: CPT

## 2022-03-19 PROCEDURE — 97150 GROUP THERAPEUTIC PROCEDURES: CPT

## 2022-03-19 PROCEDURE — 80048 BASIC METABOLIC PNL TOTAL CA: CPT | Performed by: ORTHOPAEDIC SURGERY

## 2022-03-19 PROCEDURE — 25010000002 KETOROLAC TROMETHAMINE PER 15 MG: Performed by: ORTHOPAEDIC SURGERY

## 2022-03-19 RX ORDER — HYDROCHLOROTHIAZIDE 25 MG/1
6.25 TABLET ORAL DAILY
Status: DISCONTINUED | OUTPATIENT
Start: 2022-03-19 | End: 2022-03-19 | Stop reason: HOSPADM

## 2022-03-19 RX ORDER — ROSUVASTATIN CALCIUM 5 MG/1
10 TABLET, COATED ORAL EVERY OTHER DAY
Status: DISCONTINUED | OUTPATIENT
Start: 2022-03-19 | End: 2022-03-19 | Stop reason: HOSPADM

## 2022-03-19 RX ORDER — METOPROLOL SUCCINATE 25 MG/1
25 TABLET, EXTENDED RELEASE ORAL NIGHTLY
Status: DISCONTINUED | OUTPATIENT
Start: 2022-03-19 | End: 2022-03-19 | Stop reason: HOSPADM

## 2022-03-19 RX ORDER — VALSARTAN 80 MG/1
160 TABLET ORAL
Status: DISCONTINUED | OUTPATIENT
Start: 2022-03-19 | End: 2022-03-19 | Stop reason: HOSPADM

## 2022-03-19 RX ORDER — HYDROCODONE BITARTRATE AND ACETAMINOPHEN 7.5; 325 MG/1; MG/1
1-2 TABLET ORAL EVERY 4 HOURS PRN
Qty: 40 TABLET | Refills: 0 | Status: SHIPPED | OUTPATIENT
Start: 2022-03-19 | End: 2022-03-31 | Stop reason: SDUPTHER

## 2022-03-19 RX ADMIN — CEFAZOLIN SODIUM 2 G: 2 INJECTION, SOLUTION INTRAVENOUS at 02:01

## 2022-03-19 RX ADMIN — HYDROCODONE BITARTRATE AND ACETAMINOPHEN 1 TABLET: 7.5; 325 TABLET ORAL at 09:32

## 2022-03-19 RX ADMIN — KETOROLAC TROMETHAMINE 15 MG: 30 INJECTION, SOLUTION INTRAMUSCULAR; INTRAVENOUS at 06:43

## 2022-03-19 RX ADMIN — SENNOSIDES AND DOCUSATE SODIUM 2 TABLET: 50; 8.6 TABLET ORAL at 09:31

## 2022-03-19 RX ADMIN — ENOXAPARIN SODIUM 40 MG: 100 INJECTION SUBCUTANEOUS at 09:30

## 2022-03-19 RX ADMIN — KETOROLAC TROMETHAMINE 15 MG: 30 INJECTION, SOLUTION INTRAMUSCULAR; INTRAVENOUS at 12:39

## 2022-03-19 RX ADMIN — Medication 10 ML: at 09:34

## 2022-03-19 RX ADMIN — ACETAMINOPHEN 1000 MG: 500 TABLET ORAL at 06:42

## 2022-03-19 RX ADMIN — VALSARTAN 160 MG: 80 TABLET, FILM COATED ORAL at 09:31

## 2022-03-19 RX ADMIN — HYDROCHLOROTHIAZIDE 6.25 MG: 25 TABLET ORAL at 09:31

## 2022-03-19 RX ADMIN — ROSUVASTATIN CALCIUM 10 MG: 5 TABLET, FILM COATED ORAL at 09:31

## 2022-03-19 NOTE — PROGRESS NOTES
Orthopedic Total Hip Progress Note    Assessment/Plan Home health, weight-bear as tolerated, DVT prophylaxis, follow-up in 2 weeks    Status post-right total hip arthroplasty: Doing well postoperatively.    Pain Relief: some relief    Continues current post-op course    Activity: up with assistance    Weight Bearing: WBAT     LOS: 0 days     Subjective     Post-Operative Day: 1 post-op total hip arthroplasty  Systemic or Specific Complaints: No Complaints    Objective     Vital signs in last 24 hours:  Vitals:    03/19/22 0356 03/19/22 0700 03/19/22 0833 03/19/22 0931   BP: 135/52 (!) 128/117 91/55 122/54   BP Location: Right arm Right arm Right arm    Patient Position: Sitting Sitting Sitting    Pulse: 72 94     Resp: 18 16     Temp: 98.6 °F (37 °C) 98.9 °F (37.2 °C)     TempSrc: Oral Oral     SpO2: 100% 96% 96%    Weight:       Height:            General: alert, appears stated age, and cooperative   Neurovascular: Tibial nerve: Intact, Superficial peroneal nerve: Intact, and Deep peroneal nerve: Intact  Capillary refill: Normal   Wound: Wound clean and dry no evidence of infection.   Range of Motion: Limited flexsion and Limited extension   DVT Exam: No evidence of DVT seen on physical exam.      Hemoglobin   Date Value Ref Range Status   03/19/2022 12.0 (L) 13.0 - 17.7 g/dL Final     Hematocrit   Date Value Ref Range Status   03/19/2022 35.7 (L) 37.5 - 51.0 % Final        Basic Metabolic Panel    Sodium Sodium   Date Value Ref Range Status   03/19/2022 136 136 - 145 mmol/L Final      Potassium Potassium   Date Value Ref Range Status   03/19/2022 3.7 3.5 - 5.2 mmol/L Final      Chloride Chloride   Date Value Ref Range Status   03/19/2022 102 98 - 107 mmol/L Final      Bicarbonate No results found for: PLASMABICARB   BUN BUN   Date Value Ref Range Status   03/19/2022 16 8 - 23 mg/dL Final      Creatinine Creatinine   Date Value Ref Range Status   03/19/2022 0.99 0.76 - 1.27 mg/dL Final      Calcium Calcium   Date  Value Ref Range Status   03/19/2022 8.7 8.6 - 10.5 mg/dL Final      Glucose      No components found for: GLUCOSE.*      XR Hip With or Without Pelvis 2 - 3 View Right   Final Result    Status post right hip arthroplasty with no evidence of complication                    JULI DUMONT MD          Electronically Signed and Approved By: JULI DUMONT MD on 3/18/2022 at 13:03                           FL < 1 Hour   Final Result      XR Hip With or Without Pelvis 2 - 3 View Right   Final Result

## 2022-03-19 NOTE — THERAPY EVALUATION
Patient Name: Deacon Neil  : 1945    MRN: [id number]                              Today's Date: 3/19/2022       Admit Date: 3/18/2022    Visit Dx:     ICD-10-CM ICD-9-CM   1. Difficulty walking  R26.2 719.7   2. Primary osteoarthritis of right hip  M16.11 715.15   3. Other secondary osteoarthritis of both hips  M16.6 715.25   4. Decreased activities of daily living (ADL)  Z78.9 V49.89     Patient Active Problem List   Diagnosis   • Bladder tumor   • BPH (benign prostatic hyperplasia)   • Psoriasis   • Coronary artery disease involving native heart without angina pectoris   • Hyperlipidemia   • Hypertension, essential   • OA (osteoarthritis) of hip   • Altered bowel habits   • Constipation   • Rectal bleeding   • Screening for colon cancer     Past Medical History:   Diagnosis Date   • Anesthesia complication     heart rate dropped    • Arthritis     hips   • Back pain    • Bladder tumor     MYOFIBROBLASTIC TUMOR   • BPH (benign prostatic hyperplasia)    • Coronary artery disease involving native heart without angina pectoris 2013     Coronary artery disease. Cardiac catheterization on 2013 showed 80-85% stenosis of the mid LAD, 70-75% lesion of the diagonal 1 branch, and 40% stenosis of the proximal left circumflex artery. Medical management was advised at that point;    • Hyperlipidemia    • Hypertension, essential    • Psoriasis      Past Surgical History:   Procedure Laterality Date   • CATARACT EXTRACTION     • CIRCUMCISION     • COLONOSCOPY N/A 2/15/2022    Procedure: COLONOSCOPY;  Surgeon: Roni Pedraza MD;  Location: [facility] ENDOSCOPY;  Service: Gastroenterology;  Laterality: N/A;  HEMORRHOIDS  DIVERTICULOSIS   • CYSTOSCOPY     • TONSILLECTOMY     • TOTAL HIP ARTHROPLASTY Right 3/18/2022    Procedure: TOTAL HIP ARTHROPLASTY ANTERIOR right;  Surgeon: Bhavesh Anderson MD;  Location: [facility] MAIN OR;  Service: Orthopedics;  Laterality: Right;   • VASECTOMY        General  Information     Row Name 03/19/22 1211 03/19/22 1153       OT Time and Intention    Document Type therapy note (daily note)  -AC evaluation  -AC    Mode of Treatment individual therapy;occupational therapy  -AC individual therapy;occupational therapy  -AC    Row Name 03/19/22 1211 03/19/22 1153       General Information    Patient Profile Reviewed -- yes  -AC    Prior Level of Function -- independent:  patient reports assist for adls from spouse as needed. He states he used a cane for functional mobility and uses a shower chair for bathing.  -AC    Existing Precautions/Restrictions fall;hip, anterior;weight bearing  -AC fall;hip, anterior;weight bearing  -AC    Barriers to Rehab -- none identified  -AC    Row Name 03/19/22 1153          Occupational Profile    Reason for Services/Referral (Occupational Profile) Pt. is a 76 year old male admitted for the above diagnosis status post right total hip replacement 3/18/2022. Pt. referred to OT services to assess independence with ADLs and adl transfers/fx'l mobility. No previous OT services for current condition.  -AC     Row Name 03/19/22 1153          Living Environment    People in Home spouse  -AC     Row Name 03/19/22 1153          Home Main Entrance    Number of Stairs, Main Entrance four  -AC     Row Name 03/19/22 1211 03/19/22 1153       Cognition    Orientation Status (Cognition) oriented x 3  -AC oriented x 3  -AC    Row Name 03/19/22 1211 03/19/22 1153       Safety Issues, Functional Mobility    Safety Issues Affecting Function (Mobility) -- --  none identified  -AC    Impairments Affecting Function (Mobility) balance;endurance/activity tolerance;pain  -AC balance;endurance/activity tolerance;pain  -AC          User Key  (r) = Recorded By, (t) = Taken By, (c) = Cosigned By    Initials Name Provider Type    AC Lucy Valverde, OT Occupational Therapist                 Mobility/ADL's     Row Name 03/19/22 1211 03/19/22 1156       Bed Mobility    Comment, (Bed  Mobility) patient in recliner upon OT arrival in the room  -AC patient in recliner upon OT arrival in the room.  -    Row Name 03/19/22 1211 03/19/22 1156       Transfers    Transfers sit-stand transfer  -AC sit-stand transfer  -AC    Bed-Chair [place] (Transfers) contact guard;1 person assist;verbal cues  -AC --    Assistive Device (Bed-Chair Transfers) walker, front-wheeled  -AC --    Sit-Stand [place] (Transfers) 1 person assist;verbal cues;contact guard  - 1 person assist;verbal cues;contact guard  -AC    Row Name 03/19/22 1156          Sit-Stand Transfer    Assistive Device (Sit-Stand Transfers) walker, front-wheeled  -AC     Row Name 03/19/22 1211 03/19/22 1156       Functional Mobility    Functional Mobility- Ind. Level contact guard assist;1 person  -AC contact guard assist;1 person  -AC    Functional Mobility- Device rolling walker  -AC rolling walker  -AC    Functional Mobility- Comment patient was CGA with rolling walker for recliner to/from sink to groom.  -AC patient required CGA with rolling walker for recliner to/from sink for grooming  -    Row Name 03/19/22 1211 03/19/22 1156       Activities of Daily Living    BADL Assessment/Intervention bathing;upper body dressing;lower body dressing;grooming  - --  patient is setup for upper body bathing/dressing, CGA for standign at the sink to groom, min A for lower body bathing/dressing, CGA for toileting.  -    Row Name 03/19/22 1211 03/19/22 1156       Mobility    Extremity Weight-bearing Status right lower extremity  -AC right lower extremity  -    Right Lower Extremity (Weight-bearing Status) weight-bearing as tolerated (WBAT)  - weight-bearing as tolerated (WBAT)  -    Row Name 03/19/22 1211          Bathing Assessment/Intervention    [place] Level (Bathing) bathing skills;verbal cues;minimum assist (75% patient effort)  -     Position (Bathing) supported standing;unsupported sitting  -     Row Name 03/19/22 1211           Lower Body Dressing Assessment/Training    [place] Level (Lower Body Dressing) lower body dressing skills;doff;pants/bottoms;don;shoes/slippers;socks;minimum assist (75% patient effort)  -AC     Position (Lower Body Dressing) supported standing;unsupported sitting  -AC     Row Name 03/19/22 1211          Upper Body Dressing Assessment/Training    [place] Level (Upper Body Dressing) upper body dressing skills;doff;don;pull-over garment;set up  -AC     Position (Upper Body Dressing) unsupported sitting  -AC     Row Name 03/19/22 1211          Grooming Assessment/Training    [place] Level (Grooming) grooming skills;oral care regimen;verbal cues;contact guard assist  -AC     Position (Grooming) supported standing  -AC           User Key  (r) = Recorded By, (t) = Taken By, (c) = Cosigned By    Initials Name Provider Type    Lucy Bhatti OT Occupational Therapist               Obj/Interventions     Row Name 03/19/22 1213 03/19/22 1204       Sensory Assessment (Somatosensory)    Sensory Assessment (Somatosensory) sensation intact  - sensation intact  -AC    Row Name 03/19/22 1213 03/19/22 1204       Vision Assessment/Intervention    Visual Impairment/Limitations WFL  -AC WFL  -AC    Row Name 03/19/22 1213 03/19/22 1204       Range of Motion Comprehensive    General Range of Motion bilateral upper extremity ROM WNL  -AC bilateral upper extremity ROM WNL  -AC    Row Name 03/19/22 1213 03/19/22 1204       Strength Comprehensive (MMT)    General Manual Muscle Testing (MMT) Assessment no strength deficits identified  -AC no strength deficits identified  -AC    Row Name 03/19/22 1213 03/19/22 1204       Motor Skills    Motor Skills coordination;functional endurance  -AC coordination;functional endurance  -AC    Coordination WFL  -AC WFL  -AC    Functional Endurance fair  -AC fair  -AC    Row Name 03/19/22 1213 03/19/22 1204       Balance    Balance Assessment standing dynamic balance  -AC standing  dynamic balance  -AC    Dynamic Standing Balance contact guard;verbal cues  -AC contact guard;verbal cues  -AC    Position/Device Used, Standing Balance walker, front-wheeled  -AC walker, front-wheeled  -AC    Balance Interventions standing;sit to stand;supported;dynamic;minimal challenge;occupation based/functional task  -AC --          User Key  (r) = Recorded By, (t) = Taken By, (c) = Cosigned By    Initials Name Provider Type    AC Lucy Valverde OT Occupational Therapist               Goals/Plan     Row Name 03/19/22 1206          Transfer Goal 1 (OT)    Activity/Assistive Device (Transfer Goal 1, OT) transfers, all  -AC     [place] Level/Cues Needed (Transfer Goal 1, OT) modified independence  -AC     Time Frame (Transfer Goal 1, OT) long term goal (LTG);10 days  -     Row Name 03/19/22 1206          Bathing Goal 1 (OT)    Activity/Device (Bathing Goal 1, OT) bathing skills, all  -AC     [place] Level/Cues Needed (Bathing Goal 1, OT) modified independence  -AC     Time Frame (Bathing Goal 1, OT) long term goal (LTG);10 days  -     Row Name 03/19/22 1206          Dressing Goal 1 (OT)    Activity/Device (Dressing Goal 1, OT) dressing skills, all  -AC     [place]/Cues Needed (Dressing Goal 1, OT) modified independence  -AC     Time Frame (Dressing Goal 1, OT) long term goal (LTG);10 days  -     Row Name 03/19/22 1206          Toileting Goal 1 (OT)    Activity/Device (Toileting Goal 1, OT) toileting skills, all  -AC     [place] Level/Cues Needed (Toileting Goal 1, OT) modified independence  -AC     Time Frame (Toileting Goal 1, OT) long term goal (LTG);10 days  -     Row Name 03/19/22 1206          Grooming Goal 1 (OT)    Activity/Device (Grooming Goal 1, OT) grooming skills, all  -AC     [place] (Grooming Goal 1, OT) modified independence  -AC     Time Frame (Grooming Goal 1, OT) long term goal (LTG);10 days  -     Row Name 03/19/22 1206          Therapy Assessment/Plan (OT)     Planned Therapy Interventions (OT) activity tolerance training;BADL retraining;functional balance retraining;occupation/activity based interventions;patient/caregiver education/training;transfer/mobility retraining;ROM/therapeutic exercise  -           User Key  (r) = Recorded By, (t) = Taken By, (c) = Cosigned By    Initials Name Provider Type     Lucy Valverde OT Occupational Therapist               Clinical Impression     Row Name 03/19/22 1214 03/19/22 1205       Pain Assessment    Additional Documentation Pain Scale: FACES Pre/Post-Treatment (Group)  -AC Pain Scale: FACES Pre/Post-Treatment (Group)  -AC    Row Name 03/19/22 1214 03/19/22 1205       Pain Scale: FACES Pre/Post-Treatment    Pain: FACES Scale, Pretreatment 2-->hurts little bit  -AC 2-->hurts little bit  -AC    Posttreatment Pain Rating 2-->hurts little bit  -AC 2-->hurts little bit  -    Row Name 03/19/22 1214 03/19/22 1205       Plan of Care Review    Plan of Care Reviewed With patient  -AC patient  -AC    Progress improving  - no change  -    Outcome Evaluation patient instructed in lower body adaptive dressing technique, weightbearing status, joint protection and safety with adl transfers. Patient would benefit from use of 3-in-1 commode and rolling walker at home for safe adls. patinet to continue with therapy services in order to maximize independence with adls and functional mobility.  - Patient presents with limitations that impede his/her ability to perform ADLS. The skills of a therapist are necessary to maximize independence with ADLs.  -    Row Name 03/19/22 1205          Therapy Assessment/Plan (OT)    Patient/Family Therapy Goal Statement (OT) patient wants to resume normal activity.  -     Rehab Potential (OT) good, to achieve stated therapy goals  -     Criteria for Skilled Therapeutic Interventions Met (OT) yes;meets criteria;skilled treatment is necessary  -     Therapy Frequency (OT) 5 times/wk  -     Nam  Name 03/19/22 1205          Therapy Plan Review/Discharge Plan (OT)    Equipment Needs Upon Discharge (OT) tub bench;commode chair;walker, rolling  -AC     Anticipated Discharge Disposition (OT) home with assist;home with outpatient therapy services  -     Row Name 03/19/22 1205          Positioning and Restraints    Pre-Treatment Position sitting in chair/recliner  -AC     Post Treatment Position chair  -AC     In Chair call light within reach;encouraged to call for assist;reclined;exit alarm on  -AC           User Key  (r) = Recorded By, (t) = Taken By, (c) = Cosigned By    Initials Name Provider Type    AC Lucy Valverde OT Occupational Therapist               Outcome Measures     Row Name 03/19/22 1207          How much help from another is currently needed...    Putting on and taking off regular lower body clothing? 3  -AC     Bathing (including washing, rinsing, and drying) 3  -AC     Toileting (which includes using toilet bed pan or urinal) 3  -AC     Putting on and taking off regular upper body clothing 4  -AC     Taking care of personal grooming (such as brushing teeth) 4  -AC     Eating meals 4  -AC     AM-PAC 6 Clicks Score (OT) 21  -AC     Row Name 03/19/22 1002          How much help from another person do you currently need...    Turning from your back to your side while in flat bed without using bedrails? 3  -DB     Moving from lying on back to sitting on the side of a flat bed without bedrails? 3  -DB     Moving to and from a bed to a chair (including a wheelchair)? 3  -DB     Standing up from a chair using your arms (e.g., wheelchair, bedside chair)? 3  -DB     Climbing 3-5 steps with a railing? 2  -DB     To walk in hospital room? 3  -DB     AM-PAC 6 Clicks Score (PT) 17  -DB     Row Name 03/19/22 1207          Functional Assessment    Outcome Measure Options AM-PAC 6 Clicks Daily Activity (OT);Optimal Instrument  -AC     Row Name 03/19/22 1207          Optimal Instrument    Optimal Instrument  Optimal - 3  -AC     Bending/Stooping 2  -AC     Standing 2  -AC     Reaching 1  -AC     From the list, choose the 3 activities you would most like to be able to do without any difficulty Bending/stooping;Standing;Reaching  -AC     Total Score Optimal - 3 5  -AC           User Key  (r) = Recorded By, (t) = Taken By, (c) = Cosigned By    Initials Name Provider Type     Lucy Valverde OT Occupational Therapist    Gisela Norris RN Registered Nurse                Occupational Therapy Education                 Title: PT OT SLP Therapies (Done)     Topic: Occupational Therapy (Done)     Point: ADL training (Done)     Description:   Instruct learner(s) on proper safety adaptation and remediation techniques during self care or transfers.   Instruct in proper use of assistive devices.              Learning Progress Summary           Patient Acceptance, E,TB,D, VU,DU by  at 3/19/2022 1208                   Point: Home exercise program (Done)     Description:   Instruct learner(s) on appropriate technique for monitoring, assisting and/or progressing therapeutic exercises/activities.              Learning Progress Summary           Patient Acceptance, E,TB,D, VU,DU by  at 3/19/2022 1208                   Point: Precautions (Done)     Description:   Instruct learner(s) on prescribed precautions during self-care and functional transfers.              Learning Progress Summary           Patient Acceptance, E,TB,D, VU,DU by  at 3/19/2022 1208                   Point: Body mechanics (Done)     Description:   Instruct learner(s) on proper positioning and spine alignment during self-care, functional mobility activities and/or exercises.              Learning Progress Summary           Patient Acceptance, E,TB,D, VU,DU by  at 3/19/2022 1208                               User Key     Initials Effective Dates Name Provider Type [facility] 06/16/21 -  Lucy Valverde OT Occupational Therapist OT              OT  Recommendation and Plan  Planned Therapy Interventions (OT): activity tolerance training, BADL retraining, functional balance retraining, occupation/activity based interventions, patient/caregiver education/training, transfer/mobility retraining, ROM/therapeutic exercise  Therapy Frequency (OT): 5 times/wk  Plan of Care Review  Plan of Care Reviewed With: patient  Progress: improving  Outcome Evaluation: patient instructed in lower body adaptive dressing technique, weightbearing status, joint protection and safety with adl transfers. Patient would benefit from use of 3-in-1 commode and rolling walker at home for safe adls. patinet to continue with therapy services in order to maximize independence with adls and functional mobility.     Time Calculation:    Time Calculation- OT     Row Name 03/19/22 1210             Time Calculation- OT    OT Received On 03/19/22  -AC      OT Goal Re-Cert Due Date 03/28/22  -AC              Timed Charges    76149 - OT Therapeutic Activity Minutes 10  -AC      61400 - OT Self Care/Mgmt Minutes 15  -AC              Untimed Charges    OT Eval/Re-eval Minutes 26  -AC              Total Minutes    Timed Charges Total Minutes 25  -AC      Untimed Charges Total Minutes 26  -AC       Total Minutes 51  -AC            User Key  (r) = Recorded By, (t) = Taken By, (c) = Cosigned By    Initials Name Provider Type    AC Lucy Valverde OT Occupational Therapist              Therapy Charges for Today     Code Description Service Date Service Provider Modifiers Qty    72908414342  OT THERAPEUTIC ACT EA 15 MIN 3/19/2022 Lucy Valverde OT GO 1    [id number] HC OT SELF CARE/MGMT/TRAIN EA 15 MIN 3/19/2022 Lucy Valverde OT GO 1    [id number]  OT EVAL LOW COMPLEXITY 2 3/19/2022 Lucy Valverde OT GO 1               Lucy Valverde OT  3/19/2022

## 2022-03-19 NOTE — PLAN OF CARE
Goal Outcome Evaluation:  Plan of Care Reviewed With: patient        Progress: no change  Outcome Evaluation: pt. is a one person assist with walker.  pt. medicated with scheduled pain medication with relief noted.

## 2022-03-19 NOTE — THERAPY TREATMENT NOTE
Acute Care - Physical Therapy Treatment Note   Rickey     Patient Name: Deacon Neil  : 1945  MRN: [id number]  Today's Date: 3/19/2022 Gait- individual; ther- ex -group setting; 4 participants         Visit Dx:     ICD-10-CM ICD-9-CM   1. Difficulty walking  R26.2 719.7   2. Primary osteoarthritis of right hip  M16.11 715.15   3. Other secondary osteoarthritis of both hips  M16.6 715.25   4. Decreased activities of daily living (ADL)  Z78.9 V49.89     Patient Active Problem List   Diagnosis   • Bladder tumor   • BPH (benign prostatic hyperplasia)   • Psoriasis   • Coronary artery disease involving native heart without angina pectoris   • Hyperlipidemia   • Hypertension, essential   • OA (osteoarthritis) of hip   • Altered bowel habits   • Constipation   • Rectal bleeding   • Screening for colon cancer     Past Medical History:   Diagnosis Date   • Anesthesia complication     heart rate dropped    • Arthritis     hips   • Back pain    • Bladder tumor     MYOFIBROBLASTIC TUMOR   • BPH (benign prostatic hyperplasia)    • Coronary artery disease involving native heart without angina pectoris 2013     Coronary artery disease. Cardiac catheterization on 2013 showed 80-85% stenosis of the mid LAD, 70-75% lesion of the diagonal 1 branch, and 40% stenosis of the proximal left circumflex artery. Medical management was advised at that point;    • Hyperlipidemia    • Hypertension, essential    • Psoriasis      Past Surgical History:   Procedure Laterality Date   • CATARACT EXTRACTION     • CIRCUMCISION     • COLONOSCOPY N/A 2/15/2022    Procedure: COLONOSCOPY;  Surgeon: Roni Pedraza MD;  Location: [facility] ENDOSCOPY;  Service: Gastroenterology;  Laterality: N/A;  HEMORRHOIDS  DIVERTICULOSIS   • CYSTOSCOPY     • TONSILLECTOMY     • TOTAL HIP ARTHROPLASTY Right 3/18/2022    Procedure: TOTAL HIP ARTHROPLASTY ANTERIOR right;  Surgeon: Bhavesh Anderson MD;  Location: [facility] MAIN OR;  Service:  Orthopedics;  Laterality: Right;   • VASECTOMY       PT Assessment (last 12 hours)     PT Evaluation and Treatment     [facility] Name 03/19/22 1245          Physical Therapy Time and Intention    Subjective Information complains of;weakness;pain  -     Document Type therapy note (daily note)  -     Mode of Treatment physical therapy;group therapy;individual therapy  -RH     Patient Effort fair  -[facility] Name 03/19/22 1245          Pain    Pain Location - Side/Orientation Right  -     Pain Location - hip  -[facility] Name 03/19/22 1245          Pain Scale: FACES Pre/Post-Treatment    Pain: FACES Scale, Pretreatment 2-->hurts little bit  -     Posttreatment Pain Rating 2-->hurts little bit  -[facility] Name 03/19/22 1245          Range of Motion (ROM)    Range of Motion --  Pt R hip AAROM at 90 degrees flex and 20 degrees abd.  -[facility] Name 03/19/22 1245          Strength (Manual Muscle Testing)    Strength (Manual Muscle Testing) --  Pt R hip flex strength at 3-/5.  -[facility] Name 03/19/22 1245          Transfers    Transfers sit-stand transfer;stand-sit transfer  -     Sit-Stand [place] (Transfers) contact guard  -     Stand-Sit [place] (Transfers) contact guard  -[facility] Name 03/19/22 1245          Sit-Stand Transfer    Assistive Device (Sit-Stand Transfers) walker, front-wheeled  -[facility] Name 03/19/22 1245          Stand-Sit Transfer    Assistive Device (Stand-Sit Transfers) walker, front-wheeled  -[facility] Name 03/19/22 1245          Gait/Stairs (Locomotion)    Gait/Stairs Locomotion gait/ambulation independence;gait/ambulation assistive device;distance ambulated;gait pattern;gait deviations  -     [place] Level (Gait) contact guard  -     Assistive Device (Gait) walker, front-wheeled  -     Distance in Feet (Gait) 125  -     Pattern (Gait) 3-point;step-through  -     Deviations/Abnormal Patterns (Gait) base of support, narrow;carolin decreased;gait speed  decreased;stride length decreased  -RH     Negotiation (Stairs) stairs independence;stairs assistive device;handrail location;number of steps;ascending technique;descending technique  -RH     [place] Level (Stairs) contact guard  -RH     Handrail Location (Stairs) both sides  -RH     Number of Steps (Stairs) 5  -RH     Ascending Technique (Stairs) step-to-step  -RH     Descending Technique (Stairs) step-to-step  -RH     Row Name             Wound 03/18/22 1105 Right anterior hip Incision    Wound - Properties Group Placement Date: 03/18/22  -AM Placement Time: 1105  -AM Side: Right  -AM Orientation: anterior  -AM Location: hip  -AM Primary Wound Type: Incision  -AM     Retired Wound - Properties Group Placement Date: 03/18/22  -AM Placement Time: 1105  -AM Side: Right  -AM Orientation: anterior  -AM Location: hip  -AM Primary Wound Type: Incision  -AM     Retired Wound - Properties Group Date first assessed: 03/18/22  -AM Time first assessed: 1105  -AM Side: Right  -AM Location: hip  -AM Primary Wound Type: Incision  -AM     Row Name 03/19/22 1245          Progress Summary (PT)    Progress Toward Functional Goals (PT) progress toward functional goals is fair  -RH           User Key  (r) = Recorded By, (t) = Taken By, (c) = Cosigned By    Initials Name Provider Type    AM Dorothy Craven, RN Registered Nurse    RH Duke Moran PTA Physical Therapy Assistant               Right Hip Ther -ex   Exercise  Reps  Sets    Long arc quads   10 2   Short arc quads  10 2   Heel slides  10 2   Ankle pumps  10 2   Quad sets  10 2   Glut sets  10 2   Abduction/ Adduction  10 2        Physical Therapy Education                 Title: PT OT SLP Therapies (Done)     Topic: Physical Therapy (Resolved)     Point: Mobility training (Resolved)     Learning Progress Summary           Patient Acceptance, E, VU by DP at 3/18/2022 1408   Significant Other Acceptance, E, VU by DP at 3/18/2022 1408                               User Key      Initials Effective Dates Name Provider Type Discipline    DP 06/03/21 -  Ally Moss, PT Physical Therapist PT              PT Recommendation and Plan     Progress Summary (PT)  Progress Toward Functional Goals (PT): progress toward functional goals is fair   Outcome Measures     Row Name 03/19/22 1207 03/18/22 1400          How much help from another person do you currently need...    Turning from your back to your side while in flat bed without using bedrails? 3  -RH 3  -DP     Moving from lying on back to sitting on the side of a flat bed without bedrails? 3  -RH 2  -DP     Moving to and from a bed to a chair (including a wheelchair)? 3  -RH 3  -DP     Standing up from a chair using your arms (e.g., wheelchair, bedside chair)? 3  -RH 3  -DP     Climbing 3-5 steps with a railing? 3  -RH 2  -DP     To walk in hospital room? 4  -RH 3  -DP     AM-PAC 6 Clicks Score (PT) 19  -RH 16  -DP            Functional Assessment    Outcome Measure Options -- AM-PAC 6 Clicks Basic Mobility (PT)  -DP           User Key  (r) = Recorded By, (t) = Taken By, (c) = Cosigned By    Initials Name Provider Type    RH Duke Moran PTA Physical Therapy Assistant    DP Ally Moss, PT Physical Therapist                 Time Calculation:    PT Charges     Row Name 03/19/22 1244             Time Calculation    PT Received On 03/19/22  -RH              Timed Charges    32066 - Gait Training Minutes  10  -RH      03858 - PT Therapeutic Activity Minutes 4  -RH              Untimed Charges    PT Group Therapy Minutes 30  -RH              Total Minutes    Timed Charges Total Minutes 14  -RH      Untimed Charges Total Minutes 30  -RH       Total Minutes 44  -RH            User Key  (r) = Recorded By, (t) = Taken By, (c) = Cosigned By    Initials Name Provider Type    RH Duke Moran PTA Physical Therapy Assistant              Therapy Charges for Today     Code Description Service Date Service Provider Modifiers Qty    65110525280 HC GAIT  TRAINING EA 15 MIN 3/19/2022 Duke Moran, JEF GP 1    21137838186 HC PT THER PROC GROUP 3/19/2022 Duke Moran PTA GP 1          PT G-Codes  Outcome Measure Options: AM-PAC 6 Clicks Daily Activity (OT), Optimal Instrument  AM-PAC 6 Clicks Score (PT): 19  AM-PAC 6 Clicks Score (OT): 21    Duke Moran PTA  3/19/2022

## 2022-03-19 NOTE — DISCHARGE SUMMARY
[facility]        HOSPITALIST  DISCHARGE SUMMARY    Patient Name: Deacon Neil  : 1945  MRN: [id number]    Date of Admission: 3/18/2022  Date of Discharge:  3/19/2022  Primary Care Physician: Ghazala Freitas APRN    Consults     Date and Time Order Name Status Description    3/18/2022  2:23 PM Inpatient Hospitalist Consult            Active and Resolved Hospital Problems:  Active Hospital Problems    Diagnosis POA   • **OA (osteoarthritis) of hip [M16.9] Unknown      Resolved Hospital Problems   No resolved problems to display.       Hospital Course     Hospital Course:  Deacon Neil is a 76 y.o. male with past medical history of plaque psoriasis, DJD, hypertension, hyperlipidemia and hemorrhoidal rectal bleed..  Patient is seen postop after right total hip arthroplasty by Dr. Anderson.  Patient is complaining of pain 3/10.  No sore throat, nausea, vomiting or abdominal pain.  No chest pain or shortness of breath.  Patient stated his low back pain is flaring up.  Patient has seen Dr. Rafael Xie for his lumbar spinal disease.  Patient has seen Dr. Pedraza for rectal bleeding and diarrhea alternating with constipation.  He blames diarrhea on his psoriatic plaque medicine and constipation on his pain medicine.  Denies any numbness tingling in the toes    Interval follow-up.  Pain tolerable.  Blood pressure trending up.  No BM yet.  Passing gas.  Patient cleared by orthopedic surgery to be released.      DISCHARGE Follow Up Recommendations for labs and diagnostics: PCP and orthopedic surgery.  Rehab with.  Home health      Day of Discharge     Vital Signs:  Temp:  [97.8 °F (36.6 °C)-98.9 °F (37.2 °C)] 98.8 °F (37.1 °C)  Heart Rate:  [59-94] 83  Resp:  [16-20] 18  BP: ()/() 108/47  Flow (L/min):  [2] 2    Physical Exam:    Constitutional: Awake, alert, no acute distress              Eyes: Pupils equal, sclerae anicteric, no conjunctival injection              HENT:  NCAT, mucous membranes moist              Neck: Supple, no thyromegaly, no lymphadenopathy, trachea midline              Respiratory: Clear to auscultation bilaterally, nonlabored respirations               Cardiovascular: RRR, no murmurs, rubs, or gallops, palpable pedal pulses bilaterally              Gastrointestinal: Positive bowel sounds, soft, nontender, nondistended              Musculoskeletal: No bilateral ankle edema, no clubbing or cyanosis to extremities.  Right hip incision dressed.  Restricted ankle motion right leg              Psychiatric: Appropriate affect, cooperative              Neurologic: Oriented x 3, strength symmetric in all extremities, Cranial Nerves grossly intact to confrontation, speech clear              Skin: psoriasis      Discharge Details        Discharge Medications      New Medications      Instructions Start Date   Eliquis 2.5 MG tablet tablet  Generic drug: apixaban   2.5 mg, Oral, Every 12 Hours Scheduled, Once Eliquis is completed, he may then resume his preoperative baby aspirin daily      HYDROcodone-acetaminophen 7.5-325 MG per tablet  Commonly known as: Norco   1-2 tablets, Oral, Every 4 Hours PRN         Changes to Medications      Instructions Start Date   ezetimibe 10 MG tablet  Commonly known as: ZETIA  What changed: when to take this   10 mg, Oral, Daily      rosuvastatin 10 MG tablet  Commonly known as: CRESTOR  What changed:   · how much to take  · how to take this  · when to take this  · additional instructions   Take 1 tab every other day.      valsartan-hydrochlorothiazide 320-12.5 MG per tablet  Commonly known as: DIOVAN-HCT  What changed: additional instructions   0.5 tablets, Oral, Daily         Continue These Medications      Instructions Start Date   coenzyme Q10 100 MG capsule   200 mg, Oral, Every Morning, INST PER ANESTHESIA PROTOCOL      Cosentyx Sensoready (300 MG) 150 MG/ML solution auto-injector  Generic drug: Secukinumab (300 MG Dose)   300 mg,  Every 30 Days, EVERY 4TH WEEK/TAKES FOR PLAQUE PSORIASIS      desloratadine 5 MG tablet  Commonly known as: CLARINEX   5 mg, Oral, Nightly      desonide 0.05 % lotion  Commonly known as: DESOWEN   desonide 0.05 % topical lotion apply sparingly and rub gently into the affected area(s) by topical route 2 times per day   Active      econazole nitrate 1 % cream  Commonly known as: SPECTAZOLE   econazole 1 % topical cream apply to the affected and surrounding areas of skin by topical route once daily   Active      GAS-X PO   Oral, For procedure.       guaiFENesin 100 MG pack   400 mg, Oral, Every 4 Hours PRN      Hydrocortisone (Perianal) 2.5 % rectal cream  Commonly known as: Anusol-HC   Rectal, 2 Times Daily      Linzess 145 MCG capsule capsule  Generic drug: linaclotide   145 mcg, Oral, Every Morning Before Breakfast      metoprolol succinate XL 25 MG 24 hr tablet  Commonly known as: TOPROL-XL   25 mg, Oral, Nightly      polyethylene glycol 17 GM/SCOOP powder  Commonly known as: MIRALAX   17 g, Oral, Daily         Stop These Medications    acetaminophen 500 MG tablet  Commonly known as: TYLENOL     aspirin 81 MG EC tablet     traMADol 50 MG tablet  Commonly known as: ULTRAM            Allergies   Allergen Reactions   • Pseudoephedrine Anaphylaxis   • Lisinopril Cough       Discharge Disposition:  Home-Health Care [facility]    Diet:  Diet Instructions     Advance Diet As Tolerated      Advance Diet As Tolerated            Discharge Activity:   Activity Instructions     Activity as Tolerated      Up WIth Assist            CODE STATUS:  Code Status and Medical Interventions:   Ordered at: 03/18/22 1557     Code Status (Patient has no pulse and is not breathing):    CPR (Attempt to Resuscitate)     Medical Interventions (Patient has pulse or is breathing):    Full Support         Future Appointments   Date Time Provider Department Center   4/1/2022  9:00 AM Tracie Bell PA [facility] ORS RING [facility]   4/20/2022  9:15 AM Gustavo  ARUN Haro [facility] GE ETW [facility]   4/25/2022 10:00 AM Randell Albert MD [facility] CD ETOWN [facility]   7/20/2022  9:45 AM Crispin Zendejas MD [facility] U ETWOOD [facility]       Additional Instructions for the Follow-ups that You Need to Schedule     Ambulatory Referral to Home Health (Outpatient)   As directed      Face to Face Visit Date: 3/19/2022    Follow-up provider for Plan of Care?: I will be treating the patient on an ongoing basis.  Please send me the Plan of Care for signature.    Follow-up provider: JESSIE ANDERSON [[id number]]    Reason/Clinical Findings: Status post right total hip    Describe mobility limitations that make leaving home difficult: Is post right total hip    Nursing/Therapeutic Services Requested: Occupational Therapy Physical Therapy Skilled Nursing    Skilled nursing orders: Pain management Wound care dressing/changes    PT orders: Total joint pathway Gait Training    Weight Bearing Status: As Tolerated    Occupational orders: Activities of daily living Strengthening Home safety assessment    Frequency: 1 Week 1         Ambulatory Referral to Physical Therapy Evaluate and treat, POST OP   As directed      2-3x/week for 8-12 weeks  + modalities    Order Comments: 2-3x/week for 8-12 weeks + modalities     Specialty needed: Evaluate and treat POST OP         Discharge Follow-up with PCP   As directed       Currently Documented PCP:    Ghazala Freitas APRN    PCP Phone Number:    [phone]     Follow Up Details: 1 week         Discharge Follow-up with Specified Provider: Dr Anderson's office; 2 Weeks   As directed      To: Dr Anderson's office    Follow Up: 2 Weeks         Discharge Follow-up with Specified Provider: Dr Anderson; 2 Weeks   As directed      To: Dr Anderson    Follow Up: 2 Weeks               Pertinent  and/or Most Recent Results     PROCEDURES:   Procedure:    TOTAL HIP ARTHROPLASTY ANTERIOR right  CPT(R) Code:  88996 - MT TOTAL HIP ARTHROPLASTY       LAB RESULTS:      Lab 03/19/22  0430    HEMOGLOBIN 12.0*   HEMATOCRIT 35.7*         Lab 03/19/22  0430   SODIUM 136   POTASSIUM 3.7   CHLORIDE 102   CO2 23.8   ANION GAP 10.2   BUN 16   CREATININE 0.99   EGFR 78.5   GLUCOSE 87   CALCIUM 8.7                         Brief Urine Lab Results  (Last result in the past 365 days)      Color   Clarity   Blood   Leuk Est   Nitrite   Protein   CREAT   Urine HCG        07/21/21 1445 Yellow   Clear   Negative   Negative   Negative   Negative               Microbiology Results (last 10 days)     ** No results found for the last 240 hours. **          XR Hip With or Without Pelvis 2 - 3 View Right    Result Date: 3/18/2022  Impression:  Status post right hip arthroplasty with no evidence of complication      JULI DUMONT MD       Electronically Signed and Approved By: JULI DUMONT MD on 3/18/2022 at 13:03                           Imaging Results (All)     Procedure Component Value Units Date/Time    XR Hip With or Without Pelvis 2 - 3 View Right [[id number]] Collected: 03/18/22 1303     Updated: 03/18/22 1306    Narrative:      PROCEDURE: XR HIP W OR WO PELVIS 2-3 VIEW RIGHT     COMPARISON: [facility], CR, XR HIP W OR WO PELVIS 2-3 VIEW RIGHT, 3/18/2022, 11:06.     INDICATIONS: Post-Op Right Hip Arthroplasty     FINDINGS:   Images obtained following right hip arthroplasty.  Good alignment of the prosthetic components with   no fracture or other complication.  Expected postoperative changes in the soft tissues       Impression:       Status post right hip arthroplasty with no evidence of complication               JULI DUMONT MD         Electronically Signed and Approved By: JULI DUMONT MD on 3/18/2022 at 13:03                     XR Hip With or Without Pelvis 2 - 3 View Right [[id number]] Collected: 03/18/22 1239     Updated: 03/18/22 1242    Narrative:      PROCEDURE: XR HIP W OR WO PELVIS 2-3 VIEW RIGHT     COMPARISON: [facility], CR, XR HIPS BILATERAL W OR WO PELVIS 3-4  VIEW,   11/29/2021, 12:47.     INDICATIONS: RIGHT HIP REPLACEMENT, FLUOROTIME 32 SECONDS, 5.74 mGy     FINDINGS:      Nine intraoperative images were obtained for total right hip arthroplasty.     JOSR FRANCISCO MD         Electronically Signed and Approved By: JOSR FRANCISCO MD on 3/18/2022 at 12:38                     FL < 1 Hour [[id number]] Resulted: 03/18/22 1223     Updated: 03/18/22 1223    Narrative:      This procedure was auto-finalized with no dictation required.           Labs Pending at Discharge:          Time spent on Discharge including face to face service: More than 30 minutes  Part of this note may be an electronic transcription/translation of spoken language to printed text using the Dragon Dictation System.     TElectronically signed by Rik Aviles MD, 03/19/22, 3:11 PM EDT.

## 2022-03-19 NOTE — PLAN OF CARE
Goal Outcome Evaluation:  Plan of Care Reviewed With: patient        Progress: no change  Outcome Evaluation: Patient presents with limitations that impede his/her ability to perform ADLS. The skills of a therapist are necessary to maximize independence with ADLs.

## 2022-03-19 NOTE — DISCHARGE INSTRUCTIONS
Change Aquacel dressing on 3/21. Clean incision site with alcohol pads with every dressing change. Take off Aquacel dressing on 3/24 and start daily dressing changes using ABD pads and tape. Do daily dressing changes until follow up appoint with ortho doc. Can shower with Aquacel bandage on, no baths.

## 2022-03-24 ENCOUNTER — DOCUMENTATION (OUTPATIENT)
Dept: ORTHOPEDIC SURGERY | Facility: CLINIC | Age: 77
End: 2022-03-24

## 2022-03-24 ENCOUNTER — TELEPHONE (OUTPATIENT)
Dept: FAMILY MEDICINE CLINIC | Facility: CLINIC | Age: 77
End: 2022-03-24

## 2022-03-24 ENCOUNTER — OFFICE VISIT (OUTPATIENT)
Dept: FAMILY MEDICINE CLINIC | Facility: CLINIC | Age: 77
End: 2022-03-24

## 2022-03-24 ENCOUNTER — HOSPITAL ENCOUNTER (OUTPATIENT)
Dept: GENERAL RADIOLOGY | Facility: HOSPITAL | Age: 77
Discharge: HOME | End: 2022-03-24
Admitting: NURSE PRACTITIONER

## 2022-03-24 DIAGNOSIS — R50.9 FEVER, UNSPECIFIED FEVER CAUSE: Primary | ICD-10-CM

## 2022-03-24 DIAGNOSIS — R50.9 FEVER, UNSPECIFIED FEVER CAUSE: ICD-10-CM

## 2022-03-24 LAB
ALBUMIN SERPL-MCNC: 3.7 G/DL (ref 3.5–5.2)
ALBUMIN/GLOB SERPL: 1.2 G/DL
ALP SERPL-CCNC: 86 U/L (ref 39–117)
ALT SERPL W P-5'-P-CCNC: 34 U/L (ref 1–41)
ANION GAP SERPL CALCULATED.3IONS-SCNC: 11.8 MMOL/L (ref 5–15)
AST SERPL-CCNC: 32 U/L (ref 1–40)
BASOPHILS # BLD AUTO: 0.06 10*3/MM3 (ref 0–0.2)
BASOPHILS NFR BLD AUTO: 0.5 % (ref 0–1.5)
BILIRUB SERPL-MCNC: 0.4 MG/DL (ref 0–1.2)
BUN SERPL-MCNC: 13 MG/DL (ref 8–23)
BUN/CREAT SERPL: 16 (ref 7–25)
CALCIUM SPEC-SCNC: 9 MG/DL (ref 8.6–10.5)
CHLORIDE SERPL-SCNC: 100 MMOL/L (ref 98–107)
CO2 SERPL-SCNC: 24.2 MMOL/L (ref 22–29)
CREAT SERPL-MCNC: 0.81 MG/DL (ref 0.76–1.27)
DEPRECATED RDW RBC AUTO: 38.2 FL (ref 37–54)
EGFRCR SERPLBLD CKD-EPI 2021: 90.8 ML/MIN/1.73
EOSINOPHIL # BLD AUTO: 0.25 10*3/MM3 (ref 0–0.4)
EOSINOPHIL NFR BLD AUTO: 2.2 % (ref 0.3–6.2)
ERYTHROCYTE [DISTWIDTH] IN BLOOD BY AUTOMATED COUNT: 12.1 % (ref 12.3–15.4)
EXPIRATION DATE: NORMAL
FLUAV AG NPH QL: NEGATIVE
FLUBV AG NPH QL: NEGATIVE
GLOBULIN UR ELPH-MCNC: 3.2 GM/DL
GLUCOSE SERPL-MCNC: 109 MG/DL (ref 65–99)
HCT VFR BLD AUTO: 36.1 % (ref 37.5–51)
HGB BLD-MCNC: 11.8 G/DL (ref 13–17.7)
IMM GRANULOCYTES # BLD AUTO: 0.04 10*3/MM3 (ref 0–0.05)
IMM GRANULOCYTES NFR BLD AUTO: 0.3 % (ref 0–0.5)
INTERNAL CONTROL: NORMAL
LYMPHOCYTES # BLD AUTO: 0.93 10*3/MM3 (ref 0.7–3.1)
LYMPHOCYTES NFR BLD AUTO: 8.1 % (ref 19.6–45.3)
Lab: NORMAL
MCH RBC QN AUTO: 28.4 PG (ref 26.6–33)
MCHC RBC AUTO-ENTMCNC: 32.7 G/DL (ref 31.5–35.7)
MCV RBC AUTO: 87 FL (ref 79–97)
MONOCYTES # BLD AUTO: 1.26 10*3/MM3 (ref 0.1–0.9)
MONOCYTES NFR BLD AUTO: 10.9 % (ref 5–12)
NEUTROPHILS NFR BLD AUTO: 78 % (ref 42.7–76)
NEUTROPHILS NFR BLD AUTO: 8.97 10*3/MM3 (ref 1.7–7)
NRBC BLD AUTO-RTO: 0 /100 WBC (ref 0–0.2)
PLATELET # BLD AUTO: 365 10*3/MM3 (ref 140–450)
PMV BLD AUTO: 10.6 FL (ref 6–12)
POTASSIUM SERPL-SCNC: 3.5 MMOL/L (ref 3.5–5.2)
PROT SERPL-MCNC: 6.9 G/DL (ref 6–8.5)
RBC # BLD AUTO: 4.15 10*6/MM3 (ref 4.14–5.8)
SODIUM SERPL-SCNC: 136 MMOL/L (ref 136–145)
WBC NRBC COR # BLD: 11.51 10*3/MM3 (ref 3.4–10.8)

## 2022-03-24 PROCEDURE — 71046 X-RAY EXAM CHEST 2 VIEWS: CPT

## 2022-03-24 PROCEDURE — 80053 COMPREHEN METABOLIC PANEL: CPT | Performed by: NURSE PRACTITIONER

## 2022-03-24 PROCEDURE — 99443 PR PHYS/QHP TELEPHONE EVALUATION 21-30 MIN: CPT | Performed by: NURSE PRACTITIONER

## 2022-03-24 PROCEDURE — 85025 COMPLETE CBC W/AUTO DIFF WBC: CPT | Performed by: NURSE PRACTITIONER

## 2022-03-24 PROCEDURE — 87804 INFLUENZA ASSAY W/OPTIC: CPT | Performed by: NURSE PRACTITIONER

## 2022-03-24 NOTE — PROGRESS NOTES
Patient's wife called stating patient has been running a fever of 102.1 with a cough and upper respiratory congestion. Incision is not hot to touch, red and no drainage. Encouraged her to contact  PCP she verbalized understanding and will call his PCP.

## 2022-03-24 NOTE — TELEPHONE ENCOUNTER
SYMPTOMS - RUNNING TEMP UP TO  101  - GOES DOWN BUT THEN BACK UP - SURGEON SAID CALL PCP - CHEST CONGESTION, COUGH - COVID NEGATIVE - WANTS TO SEE IF YOU CAN DO ANYTHING FOR HIM

## 2022-03-24 NOTE — TELEPHONE ENCOUNTER
He will need an Appointment--we can do another covid or poss flu test or if needed cxr--telemed please

## 2022-03-25 ENCOUNTER — DOCUMENTATION (OUTPATIENT)
Dept: ORTHOPEDIC SURGERY | Facility: CLINIC | Age: 77
End: 2022-03-25

## 2022-03-25 RX ORDER — CEFDINIR 300 MG/1
300 CAPSULE ORAL 2 TIMES DAILY
Qty: 20 CAPSULE | Refills: 0 | Status: SHIPPED | OUTPATIENT
Start: 2022-03-25 | End: 2022-05-26

## 2022-03-25 NOTE — PROGRESS NOTES
Aris PTA with [facility] states patient is not feeling well and will not have physical therapy today. Aris said they have talked to his family doctor.

## 2022-03-31 ENCOUNTER — TELEPHONE (OUTPATIENT)
Dept: ORTHOPEDIC SURGERY | Facility: CLINIC | Age: 77
End: 2022-03-31

## 2022-03-31 DIAGNOSIS — M16.11 PRIMARY OSTEOARTHRITIS OF RIGHT HIP: Primary | ICD-10-CM

## 2022-03-31 DIAGNOSIS — R26.2 DIFFICULTY WALKING: ICD-10-CM

## 2022-03-31 RX ORDER — HYDROCODONE BITARTRATE AND ACETAMINOPHEN 7.5; 325 MG/1; MG/1
1 TABLET ORAL EVERY 4 HOURS PRN
Qty: 42 TABLET | Refills: 0 | Status: SHIPPED | OUTPATIENT
Start: 2022-03-31 | End: 2022-08-11

## 2022-04-01 ENCOUNTER — TELEPHONE (OUTPATIENT)
Dept: FAMILY MEDICINE CLINIC | Facility: CLINIC | Age: 77
End: 2022-04-01

## 2022-04-04 RX ORDER — EZETIMIBE 10 MG/1
10 TABLET ORAL DAILY
Qty: 90 TABLET | Refills: 1 | Status: SHIPPED | OUTPATIENT
Start: 2022-04-04 | End: 2022-09-22 | Stop reason: SDUPTHER

## 2022-04-05 ENCOUNTER — OFFICE VISIT (OUTPATIENT)
Dept: ORTHOPEDIC SURGERY | Facility: CLINIC | Age: 77
End: 2022-04-05

## 2022-04-05 VITALS — WEIGHT: 191 LBS | OXYGEN SATURATION: 94 % | BODY MASS INDEX: 26.74 KG/M2 | HEIGHT: 71 IN | HEART RATE: 86 BPM

## 2022-04-05 DIAGNOSIS — M25.551 RIGHT HIP PAIN: Primary | ICD-10-CM

## 2022-04-05 DIAGNOSIS — Z47.89 AFTERCARE FOLLOWING SURGERY OF THE MUSCULOSKELETAL SYSTEM: ICD-10-CM

## 2022-04-05 PROCEDURE — 99024 POSTOP FOLLOW-UP VISIT: CPT | Performed by: PHYSICIAN ASSISTANT

## 2022-04-05 NOTE — PROGRESS NOTES
"Chief Complaint  Pain and Follow-up of the Right Hip and Suture / Staple Removal    Subjective          Deacon Neil presents to [facility] for follow-up on right hip status post right GUALBERTO by Dr. Anderson 3/18/2022.  Patient's wife is with him today and states he had a postop fever, had to go to primary doctor and get antibiotic, cefdinir, and a chest x-ray.  Patient is feeling better now.  Denies having fevers at this time.  States home health physical therapy is coming but it has been difficult for the patient because their schedules do not align.  He is using a walker for ambulation assistance today but states he uses a cane around the house.  Taking hydrocodone as needed.    Objective   Allergies   Allergen Reactions   • Pseudoephedrine Anaphylaxis   • Lisinopril Cough       Vital Signs:   Pulse 86   Ht 180.3 cm (71\")   Wt 86.6 kg (191 lb)   SpO2 94%   BMI 26.64 kg/m²       Physical Exam  Constitutional:       Appearance: Normal appearance. Patient is well-developed and normal weight.   HENT:      Head: Normocephalic.      Right Ear: Hearing and external ear normal.      Left Ear: Hearing and external ear normal.      Nose: Nose normal.   Eyes:      Conjunctiva/sclera: Conjunctivae normal.   Cardiovascular:      Rate and Rhythm: Normal rate.   Pulmonary:      Effort: Pulmonary effort is normal.      Breath sounds: No wheezing or rales.   Abdominal:      Palpations: Abdomen is soft.      Tenderness: There is no abdominal tenderness.   Musculoskeletal:      Cervical back: Normal range of motion.   Skin:     Findings: No rash.   Neurological:      Mental Status: Patient is alert and oriented to person, place, and time.   Psychiatric:         Mood and Affect: Mood and affect normal.         Judgment: Judgment normal.     Ortho Exam  Right hip: Skin intact with well-healing surgical incision, no erythema dehiscence or purulent drainage, good hip flexion and abduction, minimal pain " with internal or external rotation, gait mildly antalgic, good range of motion knee ankle digits, sensation light touch intact in posterior tib pulses 2+  Result Review :            Imaging Results (Most Recent)     Procedure Component Value Units Date/Time    XR Hip With or Without Pelvis 2 - 3 View Right [[id number]] Resulted: 04/05/22 1415     Updated: 04/05/22 1416    Narrative:      X-Ray Report:  Study: X-rays ordered, taken in the office, and reviewed today  Site: Right hip xray  Indication: Pain  View: AP and Lateral view(s)  Findings: Hardware intact from right GUALBERTO without failure or loosening, no   malalignment  Prior studies available for comparison: yes                   Assessment and Plan    Problem List Items Addressed This Visit        Musculoskeletal and Injuries    Aftercare following surgery of the right total hip arthroplasty      Other Visit Diagnoses     Right hip pain    -  Primary    Relevant Orders    XR Hip With or Without Pelvis 2 - 3 View Right (Completed)          Follow Up   Return in about 4 weeks (around 5/3/2022) for Recheck.  Patient Instructions   Staples removed wound care discussed.  X-rays taken and reviewed.  Continue home health physical therapy and home exercises daily.  Rest ice elevate.  Follow-up in 4 weeks with no x-ray at that time.    Patient was given instructions and counseling regarding his condition or for health maintenance advice. Please see specific information pulled into the AVS if appropriate.

## 2022-04-05 NOTE — PATIENT INSTRUCTIONS
Staples removed wound care discussed.  X-rays taken and reviewed.  Continue home health physical therapy and home exercises daily.  Rest ice elevate.  Follow-up in 4 weeks with no x-ray at that time.

## 2022-04-07 ENCOUNTER — TELEPHONE (OUTPATIENT)
Dept: GASTROENTEROLOGY | Facility: CLINIC | Age: 77
End: 2022-04-07

## 2022-04-07 ENCOUNTER — DOCUMENTATION (OUTPATIENT)
Dept: ORTHOPEDIC SURGERY | Facility: CLINIC | Age: 77
End: 2022-04-07

## 2022-04-07 NOTE — TELEPHONE ENCOUNTER
Spoke with Mr. Neil wife on what he needed to use to help with his bowels. Hannah had prescribed Miralax. She said they had stopped that but she couldn't remember why. I told her to do one scoop in 8oz of water once a day if that was too much to do every other day. If they had had any other questions to give the office a call

## 2022-04-07 NOTE — PROGRESS NOTES
Adryan with [facility], saw patient yesterday afternoon. Patient stated he went to set down in a chair and slid out of the chair because of pillows in the chair. EMS was called and helped him get up. No injury, bruising or increase in pain.

## 2022-05-02 DIAGNOSIS — K59.00 CONSTIPATION, UNSPECIFIED CONSTIPATION TYPE: Primary | ICD-10-CM

## 2022-05-02 RX ORDER — POLYETHYLENE GLYCOL 3350 17 G/17G
17 POWDER, FOR SOLUTION ORAL DAILY
Qty: 30 PACKET | Refills: 3 | Status: SHIPPED | OUTPATIENT
Start: 2022-05-02 | End: 2022-05-09 | Stop reason: CLARIF

## 2022-05-05 ENCOUNTER — OFFICE VISIT (OUTPATIENT)
Dept: ORTHOPEDIC SURGERY | Facility: CLINIC | Age: 77
End: 2022-05-05

## 2022-05-05 VITALS — BODY MASS INDEX: 26.74 KG/M2 | WEIGHT: 191 LBS | HEIGHT: 71 IN

## 2022-05-05 DIAGNOSIS — Z47.89 AFTERCARE FOLLOWING SURGERY OF THE MUSCULOSKELETAL SYSTEM: Primary | ICD-10-CM

## 2022-05-05 PROCEDURE — 99024 POSTOP FOLLOW-UP VISIT: CPT | Performed by: ORTHOPAEDIC SURGERY

## 2022-05-05 NOTE — PROGRESS NOTES
"Chief Complaint  Follow-up of the Right Hip     Subjective      Deacon Neil presents to [facility] for a follow-up of right hip. Patient is s/p right GUALBERTO by Dr. Anderson 3/18/2022.  He currently has home therapy, they come 3-4 times a week. He states that they are doing a wonderful job with him. He has home therapy for another 2 weeks before he is able to transition to outpatient. He denies any new injuries, fevers, chills and groin pain. He reports having no hip pain but he does have back pain (at L2-L4).     Allergies   Allergen Reactions   • Pseudoephedrine Anaphylaxis   • Lisinopril Cough        Social History     Socioeconomic History   • Marital status:    Tobacco Use   • Smoking status: Former Smoker     Quit date: 2013     Years since quittin.9   • Smokeless tobacco: Never Used   • Tobacco comment: STARTED AGE 15 QUIT AGE 67   Vaping Use   • Vaping Use: Never used   Substance and Sexual Activity   • Alcohol use: Not Currently     Comment: FORMER   • Drug use: Never   • Sexual activity: Defer        Review of Systems     Objective   Vital Signs:   Ht 180.3 cm (71\")   Wt 86.6 kg (191 lb)   BMI 26.64 kg/m²       Physical Exam  Constitutional:       Appearance: Normal appearance. Patient is well-developed and normal weight.   HENT:      Head: Normocephalic.      Right Ear: Hearing and external ear normal.      Left Ear: Hearing and external ear normal.      Nose: Nose normal.   Eyes:      Conjunctiva/sclera: Conjunctivae normal.   Cardiovascular:      Rate and Rhythm: Normal rate.   Pulmonary:      Effort: Pulmonary effort is normal.      Breath sounds: No wheezing or rales.   Abdominal:      Palpations: Abdomen is soft.      Tenderness: There is no abdominal tenderness.   Musculoskeletal:      Cervical back: Normal range of motion.   Skin:     Findings: No rash.   Neurological:      Mental Status: Patient is alert and oriented to person, place, and time. "   Psychiatric:         Mood and Affect: Mood and affect normal.         Judgment: Judgment normal.       Ortho Exam      RIGHT HIP: Ambulation with a cane. Calf soft. No signs of DVT. Incisions are well healing. No signs of drainage. No signs of infection. Good transition from a seated position. Non-antalgic gait.     Procedures      Imaging Results (Most Recent)     None           Result Review :       No results found.           Assessment and Plan     Diagnoses and all orders for this visit:    1. Aftercare following surgery of the right total hip arthroplasty (Primary)        Patient to finish out home therapy and transition to outpatient therapy.     Call or return if worsening symptoms.    Follow Up     3-4 months.       Patient was given instructions and counseling regarding his condition or for health maintenance advice. Please see specific information pulled into the AVS if appropriate.     Scribed for Bhavesh Anderson MD by Ilda Graham.  05/05/22   13:46 EDT    I have personally performed the services described in this document as scribed by the above individual and it is both accurate and complete. Bhavesh Anderson MD 05/06/22

## 2022-05-09 DIAGNOSIS — K59.00 CONSTIPATION, UNSPECIFIED CONSTIPATION TYPE: Primary | ICD-10-CM

## 2022-05-09 RX ORDER — POLYETHYLENE GLYCOL 3350 17 G/17G
17 POWDER, FOR SOLUTION ORAL DAILY
Qty: 510 G | Refills: 2 | Status: SHIPPED | OUTPATIENT
Start: 2022-05-09 | End: 2022-08-07

## 2022-05-19 ENCOUNTER — LAB (OUTPATIENT)
Dept: LAB | Facility: HOSPITAL | Age: 77
End: 2022-05-19

## 2022-05-19 DIAGNOSIS — I10 HYPERTENSION, ESSENTIAL: ICD-10-CM

## 2022-05-19 DIAGNOSIS — E78.5 HYPERLIPIDEMIA, UNSPECIFIED HYPERLIPIDEMIA TYPE: ICD-10-CM

## 2022-05-19 LAB
ALBUMIN SERPL-MCNC: 4.3 G/DL (ref 3.5–5.2)
ALBUMIN/GLOB SERPL: 1.5 G/DL
ALP SERPL-CCNC: 92 U/L (ref 39–117)
ALT SERPL W P-5'-P-CCNC: 21 U/L (ref 1–41)
ANION GAP SERPL CALCULATED.3IONS-SCNC: 12 MMOL/L (ref 5–15)
AST SERPL-CCNC: 16 U/L (ref 1–40)
BILIRUB SERPL-MCNC: 0.5 MG/DL (ref 0–1.2)
BUN SERPL-MCNC: 20 MG/DL (ref 8–23)
BUN/CREAT SERPL: 23.5 (ref 7–25)
CALCIUM SPEC-SCNC: 9.9 MG/DL (ref 8.6–10.5)
CHLORIDE SERPL-SCNC: 102 MMOL/L (ref 98–107)
CHOLEST SERPL-MCNC: 154 MG/DL (ref 0–200)
CO2 SERPL-SCNC: 25 MMOL/L (ref 22–29)
CREAT SERPL-MCNC: 0.85 MG/DL (ref 0.76–1.27)
EGFRCR SERPLBLD CKD-EPI 2021: 88.9 ML/MIN/1.73
GLOBULIN UR ELPH-MCNC: 2.8 GM/DL
GLUCOSE SERPL-MCNC: 105 MG/DL (ref 65–99)
HDLC SERPL-MCNC: 33 MG/DL (ref 40–60)
LDLC SERPL CALC-MCNC: 100 MG/DL (ref 0–100)
LDLC/HDLC SERPL: 2.99 {RATIO}
MAGNESIUM SERPL-MCNC: 2.1 MG/DL (ref 1.6–2.4)
POTASSIUM SERPL-SCNC: 3.9 MMOL/L (ref 3.5–5.2)
PROT SERPL-MCNC: 7.1 G/DL (ref 6–8.5)
SODIUM SERPL-SCNC: 139 MMOL/L (ref 136–145)
TRIGL SERPL-MCNC: 112 MG/DL (ref 0–150)
VLDLC SERPL-MCNC: 21 MG/DL (ref 5–40)

## 2022-05-19 PROCEDURE — 36415 COLL VENOUS BLD VENIPUNCTURE: CPT

## 2022-05-19 PROCEDURE — 83735 ASSAY OF MAGNESIUM: CPT

## 2022-05-19 PROCEDURE — 80053 COMPREHEN METABOLIC PANEL: CPT

## 2022-05-19 PROCEDURE — 80061 LIPID PANEL: CPT

## 2022-05-24 NOTE — PROGRESS NOTES
Chief Complaint  Coronary Artery Disease, Hyperlipidemia, and Hypertension    Subjective        Deacon Neil presents to [facility]  Is a 77-year-old white male comes in to evaluate his coronary artery disease, hypertension and hyperlipidemia. Denies any chest pains, shortness of breath, palpitations, dizziness, syncope, swelling, PND, or orthopnea.  Cardiac wise he has no complaints.  Since he was was last here he had right hip replacement.  He denies having any more leg pain that he has had for over 10 years now.  He has gone through physical therapy and is walking on a regular basis and he is feels the best is had in a long time.       Past History:    Past Medical History:   Diagnosis Date   • Anesthesia complication     heart rate dropped    • Arthritis     hips   • Back pain    • Bladder tumor     MYOFIBROBLASTIC TUMOR   • BPH (benign prostatic hyperplasia)    • Coronary artery disease involving native heart without angina pectoris 06/21/2013     Coronary artery disease. Cardiac catheterization on 06/21/2013 showed 80-85% stenosis of the mid LAD, 70-75% lesion of the diagonal 1 branch, and 40% stenosis of the proximal left circumflex artery. Medical management was advised at that point;    • Hyperlipidemia    • Hypertension, essential    • Psoriasis         Family History: family history includes Hypertension in his mother.     Social History: reports that he quit smoking about 9 years ago. His smoking use included cigarettes. He started smoking about 64 years ago. He has a 82.50 pack-year smoking history. He has never used smokeless tobacco. He reports previous alcohol use. He reports that he does not use drugs.    Allergies: Pseudoephedrine and Lisinopril    Past Surgical History:   Procedure Laterality Date   • CATARACT EXTRACTION     • CIRCUMCISION     • COLONOSCOPY N/A 02/15/2022    Procedure: COLONOSCOPY;  Surgeon: Roni Pedraza MD;  Location: [facility] ENDOSCOPY;   Service: Gastroenterology;  Laterality: N/A;  HEMORRHOIDS  DIVERTICULOSIS   • CYSTOSCOPY     • TONSILLECTOMY     • TOTAL HIP ARTHROPLASTY Right 03/18/2022    Procedure: TOTAL HIP ARTHROPLASTY ANTERIOR right;  Surgeon: Bhavesh Anderson MD;  Location: [facility] MAIN OR;  Service: Orthopedics;  Laterality: Right;   • VASECTOMY            Current Outpatient Medications:   •  aspirin 81 MG EC tablet, Take 81 mg by mouth Daily., Disp: , Rfl:   •  coenzyme Q10 100 MG capsule, Take 200 mg by mouth Every Morning. INST PER ANESTHESIA PROTOCOL, Disp: , Rfl:   •  Cosentyx Sensoready, 300 MG, 150 MG/ML solution auto-injector, 300 mg Every 30 (Thirty) Days. EVERY 4TH WEEK/TAKES FOR PLAQUE PSORIASIS, Disp: , Rfl:   •  desloratadine (CLARINEX) 5 MG tablet, Take 5 mg by mouth Every Night., Disp: , Rfl:   •  desonide (DESOWEN) 0.05 % lotion, desonide 0.05 % topical lotion apply sparingly and rub gently into the affected area(s) by topical route 2 times per day   Active, Disp: , Rfl:   •  econazole nitrate (SPECTAZOLE) 1 % cream, econazole 1 % topical cream apply to the affected and surrounding areas of skin by topical route once daily   Active, Disp: , Rfl:   •  ezetimibe (ZETIA) 10 MG tablet, TAKE 1 TABLET BY MOUTH DAILY, Disp: 90 tablet, Rfl: 1  •  guaiFENesin 100 MG pack, Take 400 mg by mouth Every 4 (Four) Hours As Needed., Disp: , Rfl:   •  HYDROcodone-acetaminophen (Norco) 7.5-325 MG per tablet, Take 1 tablet by mouth Every 4 (Four) Hours As Needed for Moderate Pain ., Disp: 42 tablet, Rfl: 0  •  metoprolol succinate XL (TOPROL-XL) 25 MG 24 hr tablet, Take 25 mg by mouth Every Night., Disp: , Rfl:   •  polyethylene glycol (MiraLax) 17 GM/SCOOP powder, Take 17 g by mouth Daily for 90 days., Disp: 510 g, Rfl: 2  •  valsartan-hydrochlorothiazide (DIOVAN-HCT) 320-12.5 MG per tablet, Take 0.5 tablets by mouth Daily. (Patient taking differently: Take 0.5 tablets by mouth Daily. INST PER ANESTHESIA PROTOCOL), Disp: 45 tablet, Rfl: 1  •   "Hydrocortisone, Perianal, (Anusol-HC) 2.5 % rectal cream, Insert  into the rectum 2 (Two) Times a Day., Disp: 28 g, Rfl: 1  •  rosuvastatin (CRESTOR) 10 MG tablet, Take 1 tablet by mouth Daily., Disp: 90 tablet, Rfl: 3     Medications Discontinued During This Encounter   Medication Reason   • cefdinir (OMNICEF) 300 MG capsule Historical Med - Therapy completed   • apixaban (ELIQUIS) 2.5 MG tablet tablet Historical Med - Therapy completed   • rosuvastatin (CRESTOR) 10 MG tablet Dose adjustment        Review of Systems   Constitutional: Negative for fatigue.   Respiratory: Negative for shortness of breath.    Cardiovascular: Negative for chest pain, palpitations and leg swelling.   Neurological: Negative for dizziness and light-headedness.   All other systems reviewed and are negative.       Objective     Physical Exam  Constitutional:       General: He is not in acute distress.     Appearance: Normal appearance.   Neck:      Vascular: No carotid bruit.   Cardiovascular:      Rate and Rhythm: Normal rate and regular rhythm.      Heart sounds: Normal heart sounds.   Pulmonary:      Effort: Pulmonary effort is normal.      Breath sounds: Normal breath sounds.   Musculoskeletal:      Right lower leg: No edema.      Left lower leg: No edema.   Neurological:      Mental Status: He is alert.       /65   Pulse 77   Ht 177.8 cm (70\")   Wt 93.9 kg (207 lb)   BMI 29.70 kg/m²       Vitals:    05/26/22 1353   BP: 151/65   Pulse: 77       Result Review :         The following data was reviewed by: ARUN Turner on 05/26/2022:        CMP    CMP 3/19/22 3/24/22 5/19/22   Glucose 87 109 (A) 105 (A)   BUN 16 13 20   Creatinine 0.99 0.81 0.85   Sodium 136 136 139   Potassium 3.7 3.5 3.9   Chloride 102 100 102   Calcium 8.7 9.0 9.9   Albumin  3.70 4.30   Total Bilirubin  0.4 0.5   Alkaline Phosphatase  86 92   AST (SGOT)  32 16   ALT (SGPT)  34 21   (A) Abnormal value            CBC w/diff    CBC w/Diff 3/8/22 3/19/22 " 3/24/22   WBC 7.11  11.51 (A)   RBC 4.80  4.15   Hemoglobin 13.8 12.0 (A) 11.8 (A)   Hematocrit 41.2 35.7 (A) 36.1 (A)   MCV 85.8  87.0   MCH 28.8  28.4   MCHC 33.5  32.7   RDW 12.8  12.1 (A)   Platelets 321  365   Neutrophil Rel % 61.6  78.0 (A)   Immature Granulocyte Rel % 0.1  0.3   Lymphocyte Rel % 21.2  8.1 (A)   Monocyte Rel % 11.3  10.9   Eosinophil Rel % 4.4  2.2   Basophil Rel % 1.4  0.5   (A) Abnormal value             Lipid Panel    Lipid Panel 5/19/22   Total Cholesterol 154   Triglycerides 112   HDL Cholesterol 33 (A)   VLDL Cholesterol 21   LDL Cholesterol  100   LDL/HDL Ratio 2.99   (A) Abnormal value               Magnesium   Date Value Ref Range Status   05/19/2022 2.1 1.6 - 2.4 mg/dL Final        A1C Last 3 Results    HGBA1C Last 3 Results 3/8/22   Hemoglobin A1C 5.70 (A)   (A) Abnormal value                          Assessment and Plan    Diagnoses and all orders for this visit:    1. Coronary artery disease involving native heart without angina pectoris, unspecified vessel or lesion type (Primary)  Assessment & Plan:  Stable.  Continue aspirin 81 mg.      2. Hyperlipidemia, unspecified hyperlipidemia type  Assessment & Plan:  Hyperlipidemia mixed with LDL not quite to goal.  Continue Zetia 10 mg a day.  Increase rosuvastatin to 10 mg every day.  Check lipids in 3 months    Orders:  -     Lipid Panel; Future  -     Comprehensive Metabolic Panel; Future  -     rosuvastatin (CRESTOR) 10 MG tablet; Take 1 tablet by mouth Daily.  Dispense: 90 tablet; Refill: 3    3. Hypertension, essential  Assessment & Plan:  Has a degree of whitecoat syndrome with good readings at home.  Continue valsartan /12.5 mg half a tab and Toprol ER 25 mg            Follow Up     Return in about 6 months (around 11/26/2022) for with Dr. Albert.    Patient was given instructions and counseling regarding his condition or for health maintenance advice. Please see specific information pulled into the AVS if appropriate.        Sarahy Rooney, ARUN  05/26/22 07:22 EDT

## 2022-05-25 ENCOUNTER — OFFICE VISIT (OUTPATIENT)
Dept: GASTROENTEROLOGY | Facility: CLINIC | Age: 77
End: 2022-05-25

## 2022-05-25 VITALS
OXYGEN SATURATION: 97 % | SYSTOLIC BLOOD PRESSURE: 120 MMHG | HEIGHT: 71 IN | DIASTOLIC BLOOD PRESSURE: 65 MMHG | WEIGHT: 209.4 LBS | HEART RATE: 75 BPM | BODY MASS INDEX: 29.31 KG/M2

## 2022-05-25 DIAGNOSIS — K59.00 CONSTIPATION, UNSPECIFIED CONSTIPATION TYPE: Primary | ICD-10-CM

## 2022-05-25 DIAGNOSIS — K64.9 HEMORRHOIDS, UNSPECIFIED HEMORRHOID TYPE: ICD-10-CM

## 2022-05-25 PROCEDURE — 99213 OFFICE O/P EST LOW 20 MIN: CPT

## 2022-05-25 NOTE — PROGRESS NOTES
Chief Complaint  Follow-up (Colon- 2- month /)    Deacon Neil is a 77 y.o. male who presents to [facility] for colonoscopy follow up.     History of present Illness  Patient presents to the office for colonoscopy follow up. He has 2 bowel movements a day. He takes 1 scoop of miralax every day. Denies constipation and diarrhea with this regimen. He has frequent urge to urinate about every 2 hours even through the night.  Denies hematochezia and melena.  Denies problems regarding hemorrhoids.  Denies nausea, vomiting, dysphagia, heartburn, and epigastric pain.    Colonoscopy 2/15/2022 by Dr. Pedraza-diverticulosis of the sigmoid colon, internal and external hemorrhoids.  Recommended repeat colonoscopy in 5 years due to quality of prep.    Past Medical History:   Diagnosis Date   • Anesthesia complication     heart rate dropped    • Arthritis     hips   • Back pain    • Bladder tumor     MYOFIBROBLASTIC TUMOR   • BPH (benign prostatic hyperplasia)    • Coronary artery disease involving native heart without angina pectoris 06/21/2013     Coronary artery disease. Cardiac catheterization on 06/21/2013 showed 80-85% stenosis of the mid LAD, 70-75% lesion of the diagonal 1 branch, and 40% stenosis of the proximal left circumflex artery. Medical management was advised at that point;    • Hyperlipidemia    • Hypertension, essential    • Psoriasis        Past Surgical History:   Procedure Laterality Date   • CATARACT EXTRACTION     • CIRCUMCISION     • COLONOSCOPY N/A 02/15/2022    Procedure: COLONOSCOPY;  Surgeon: Roni Pedraza MD;  Location: [facility] ENDOSCOPY;  Service: Gastroenterology;  Laterality: N/A;  HEMORRHOIDS  DIVERTICULOSIS   • CYSTOSCOPY     • TONSILLECTOMY     • TOTAL HIP ARTHROPLASTY Right 03/18/2022    Procedure: TOTAL HIP ARTHROPLASTY ANTERIOR right;  Surgeon: Bhavesh Anderson MD;  Location: [facility] MAIN OR;  Service: Orthopedics;  Laterality: Right;   •  VASECTOMY           Current Outpatient Medications:   •  coenzyme Q10 100 MG capsule, Take 200 mg by mouth Every Morning. INST PER ANESTHESIA PROTOCOL, Disp: , Rfl:   •  Cosentyx Sensoready, 300 MG, 150 MG/ML solution auto-injector, 300 mg Every 30 (Thirty) Days. EVERY 4TH WEEK/TAKES FOR PLAQUE PSORIASIS, Disp: , Rfl:   •  desloratadine (CLARINEX) 5 MG tablet, Take 5 mg by mouth Every Night., Disp: , Rfl:   •  desonide (DESOWEN) 0.05 % lotion, desonide 0.05 % topical lotion apply sparingly and rub gently into the affected area(s) by topical route 2 times per day   Active, Disp: , Rfl:   •  econazole nitrate (SPECTAZOLE) 1 % cream, econazole 1 % topical cream apply to the affected and surrounding areas of skin by topical route once daily   Active, Disp: , Rfl:   •  ezetimibe (ZETIA) 10 MG tablet, TAKE 1 TABLET BY MOUTH DAILY, Disp: 90 tablet, Rfl: 1  •  guaiFENesin 100 MG pack, Take 400 mg by mouth Every 4 (Four) Hours As Needed., Disp: , Rfl:   •  HYDROcodone-acetaminophen (Norco) 7.5-325 MG per tablet, Take 1 tablet by mouth Every 4 (Four) Hours As Needed for Moderate Pain ., Disp: 42 tablet, Rfl: 0  •  metoprolol succinate XL (TOPROL-XL) 25 MG 24 hr tablet, Take 25 mg by mouth Every Night., Disp: , Rfl:   •  polyethylene glycol (MiraLax) 17 GM/SCOOP powder, Take 17 g by mouth Daily for 90 days., Disp: 510 g, Rfl: 2  •  rosuvastatin (CRESTOR) 10 MG tablet, Take 1 tab every other day. (Patient taking differently: Take 10 mg by mouth Every Other Day. Take 1 tab every other day. MORNING), Disp: 45 tablet, Rfl: 3  •  valsartan-hydrochlorothiazide (DIOVAN-HCT) 320-12.5 MG per tablet, Take 0.5 tablets by mouth Daily. (Patient taking differently: Take 0.5 tablets by mouth Daily. INST PER ANESTHESIA PROTOCOL), Disp: 45 tablet, Rfl: 1  •  apixaban (ELIQUIS) 2.5 MG tablet tablet, Take 1 tablet by mouth Every 12 (Twelve) Hours. Once Eliquis is completed, he may then resume his preoperative baby aspirin daily, Disp: 20 tablet,  "Rfl: 0  •  cefdinir (OMNICEF) 300 MG capsule, Take 1 capsule by mouth 2 (Two) Times a Day., Disp: 20 capsule, Rfl: 0  •  Hydrocortisone, Perianal, (Anusol-HC) 2.5 % rectal cream, Insert  into the rectum 2 (Two) Times a Day., Disp: 28 g, Rfl: 1     Allergies   Allergen Reactions   • Pseudoephedrine Anaphylaxis   • Lisinopril Cough       Family History   Problem Relation Age of Onset   • Hypertension Mother    • Colon cancer Neg Hx    • Malig Hyperthermia Neg Hx         Social History     Social History Narrative   • Not on file       Objective       Vital Signs:   /65 (BP Location: Left arm, Patient Position: Sitting, Cuff Size: Adult)   Pulse 75   Ht 180.3 cm (70.98\")   Wt 95 kg (209 lb 6.4 oz)   SpO2 97%   BMI 29.22 kg/m²       Physical Exam  Constitutional:       Appearance: Normal appearance.   HENT:      Head: Normocephalic.   Cardiovascular:      Rate and Rhythm: Normal rate and regular rhythm.      Heart sounds: Normal heart sounds.   Pulmonary:      Effort: Pulmonary effort is normal.      Breath sounds: Normal breath sounds.   Abdominal:      General: Bowel sounds are normal.      Palpations: Abdomen is soft.   Skin:     General: Skin is warm and dry.   Neurological:      Mental Status: He is alert and oriented to person, place, and time. Mental status is at baseline.   Psychiatric:         Mood and Affect: Mood normal.         Behavior: Behavior normal.         Thought Content: Thought content normal.         Judgment: Judgment normal.         Result Review :       CBC w/diff    CBC w/Diff 3/8/22 3/19/22 3/24/22   WBC 7.11  11.51 (A)   RBC 4.80  4.15   Hemoglobin 13.8 12.0 (A) 11.8 (A)   Hematocrit 41.2 35.7 (A) 36.1 (A)   MCV 85.8  87.0   MCH 28.8  28.4   MCHC 33.5  32.7   RDW 12.8  12.1 (A)   Platelets 321  365   Neutrophil Rel % 61.6  78.0 (A)   Immature Granulocyte Rel % 0.1  0.3   Lymphocyte Rel % 21.2  8.1 (A)   Monocyte Rel % 11.3  10.9   Eosinophil Rel % 4.4  2.2   Basophil Rel % 1.4  0.5 "   (A) Abnormal value            CMP    CMP 3/19/22 3/24/22 5/19/22   Glucose 87 109 (A) 105 (A)   BUN 16 13 20   Creatinine 0.99 0.81 0.85   Sodium 136 136 139   Potassium 3.7 3.5 3.9   Chloride 102 100 102   Calcium 8.7 9.0 9.9   Albumin  3.70 4.30   Total Bilirubin  0.4 0.5   Alkaline Phosphatase  86 92   AST (SGOT)  32 16   ALT (SGPT)  34 21   (A) Abnormal value                    Assessment and Plan    Diagnoses and all orders for this visit:    1. Constipation, unspecified constipation type (Primary)    2. Hemorrhoids, unspecified hemorrhoid type    77-year-old male presents to the office for colonoscopy follow-up.  Reviewed most recent colonoscopy report with the patient.  Patient will continue MiraLAX 1 scoop daily as this is adequately relieving constipation.  Patient will utilize witch hazel wipes or baby wipes and avoid straining to prevent hemorrhoid flares.  Encourage patient to discuss frequent urination problems with his urologist.  Patient is agreeable to plan will call the office any questions or concerns.  Patient will follow up on a as needed basis.        Follow Up   No follow-ups on file.  Patient was given instructions and counseling regarding his condition or for health maintenance advice. Please see specific information pulled into the AVS if appropriate.

## 2022-05-26 ENCOUNTER — OFFICE VISIT (OUTPATIENT)
Dept: UROLOGY | Facility: CLINIC | Age: 77
End: 2022-05-26

## 2022-05-26 ENCOUNTER — OFFICE VISIT (OUTPATIENT)
Dept: CARDIOLOGY | Facility: CLINIC | Age: 77
End: 2022-05-26

## 2022-05-26 VITALS
SYSTOLIC BLOOD PRESSURE: 151 MMHG | WEIGHT: 207 LBS | HEART RATE: 77 BPM | HEIGHT: 70 IN | BODY MASS INDEX: 29.63 KG/M2 | DIASTOLIC BLOOD PRESSURE: 65 MMHG

## 2022-05-26 VITALS
DIASTOLIC BLOOD PRESSURE: 53 MMHG | WEIGHT: 207 LBS | HEIGHT: 70 IN | HEART RATE: 86 BPM | BODY MASS INDEX: 29.63 KG/M2 | SYSTOLIC BLOOD PRESSURE: 143 MMHG | TEMPERATURE: 98 F

## 2022-05-26 DIAGNOSIS — I25.10 CORONARY ARTERY DISEASE INVOLVING NATIVE HEART WITHOUT ANGINA PECTORIS, UNSPECIFIED VESSEL OR LESION TYPE: Primary | Chronic | ICD-10-CM

## 2022-05-26 DIAGNOSIS — N40.0 BENIGN PROSTATIC HYPERPLASIA, UNSPECIFIED WHETHER LOWER URINARY TRACT SYMPTOMS PRESENT: ICD-10-CM

## 2022-05-26 DIAGNOSIS — E78.5 HYPERLIPIDEMIA, UNSPECIFIED HYPERLIPIDEMIA TYPE: Chronic | ICD-10-CM

## 2022-05-26 DIAGNOSIS — I10 HYPERTENSION, ESSENTIAL: Chronic | ICD-10-CM

## 2022-05-26 DIAGNOSIS — R35.0 URINARY FREQUENCY: ICD-10-CM

## 2022-05-26 DIAGNOSIS — R39.15 URINARY URGENCY: Primary | ICD-10-CM

## 2022-05-26 LAB
BACTERIA UR QL AUTO: NORMAL /HPF
BILIRUB BLD-MCNC: NEGATIVE MG/DL
CLARITY, POC: CLEAR
COLOR UR: YELLOW
EPI CELLS #/AREA URNS HPF: 0 /[HPF]
GLUCOSE UR STRIP-MCNC: NEGATIVE MG/DL
KETONES UR QL: NEGATIVE
LEUKOCYTE EST, POC: NEGATIVE
NITRITE UR-MCNC: NEGATIVE MG/ML
PH UR: 6 [PH] (ref 5–8)
PROT UR STRIP-MCNC: NEGATIVE MG/DL
RBC # UR STRIP: ABNORMAL /UL
RBC # UR STRIP: NORMAL /HPF
RENAL EPITHELIAL, POC: 0
SP GR UR: 1.02 (ref 1–1.03)
UNIDENT CRYS URNS QL MICRO: NORMAL /HPF
UROBILINOGEN UR QL: NORMAL
WBC # UR STRIP: NORMAL /HPF

## 2022-05-26 PROCEDURE — 99213 OFFICE O/P EST LOW 20 MIN: CPT | Performed by: UROLOGY

## 2022-05-26 PROCEDURE — 99214 OFFICE O/P EST MOD 30 MIN: CPT | Performed by: NURSE PRACTITIONER

## 2022-05-26 PROCEDURE — 81002 URINALYSIS NONAUTO W/O SCOPE: CPT | Performed by: UROLOGY

## 2022-05-26 PROCEDURE — 51798 US URINE CAPACITY MEASURE: CPT | Performed by: UROLOGY

## 2022-05-26 RX ORDER — ROSUVASTATIN CALCIUM 10 MG/1
10 TABLET, COATED ORAL DAILY
Qty: 90 TABLET | Refills: 3 | Status: SHIPPED | OUTPATIENT
Start: 2022-05-26

## 2022-05-26 RX ORDER — ASPIRIN 81 MG/1
81 TABLET ORAL DAILY
COMMUNITY
End: 2022-06-29

## 2022-05-26 NOTE — ASSESSMENT & PLAN NOTE
Hyperlipidemia mixed with LDL not quite to goal.  Continue Zetia 10 mg a day.  Increase rosuvastatin to 10 mg every day.  Check lipids in 3 months

## 2022-05-26 NOTE — ASSESSMENT & PLAN NOTE
Has a degree of whitecoat syndrome with good readings at home.  Continue valsartan /12.5 mg half a tab and Toprol ER 25 mg

## 2022-05-26 NOTE — PROGRESS NOTES
"Chief Complaint  Urinary Frequency    Subjective No acute distress        Deacon Neil presents to [facility] UROLOG[facility]  History of Present Illness    Starting in February patient has been having a lot of nocturia and frequency along with urgency.  He is also having urgency incontinence and has to wear protection.  Patient has no dysuria or gross hematuria.  No family history of prostate cancer.Patient stream is okay and he is emptying his urinary bladder.    Objective No acute distress  Vital Signs:   /53   Pulse 86   Temp 98 °F (36.7 °C) (Infrared)   Ht 177.8 cm (70\")   Wt 93.9 kg (207 lb)   BMI 29.70 kg/m²     Allergies   Allergen Reactions   • Pseudoephedrine Anaphylaxis   • Lisinopril Cough      Past medical history:  has a past medical history of Anesthesia complication, Arthritis, Back pain, Bladder tumor, BPH (benign prostatic hyperplasia), Coronary artery disease involving native heart without angina pectoris (2013), Hyperlipidemia, Hypertension, essential, and Psoriasis.   Past surgical history:  has a past surgical history that includes Cataract extraction; Circumcision, non-; Cystoscopy; Tonsillectomy; Vasectomy; Colonoscopy (N/A, 02/15/2022); and Total hip arthroplasty (Right, 2022).  Partial cystectomy for fibroepithelial bladder tumor in   Personal history: family history includes Hypertension in his mother.  Social history:  reports that he quit smoking about 9 years ago. His smoking use included cigarettes. He started smoking about 64 years ago. He has a 82.50 pack-year smoking history. He has never used smokeless tobacco. He reports previous alcohol use. He reports that he does not use drugs.    Review of Systems    Please see past medical and surgical history and rest of the system is negative    Physical Exam  Constitutional:       General: He is not in acute distress.     Appearance: Normal appearance. He is normal weight. He is not " ill-appearing or toxic-appearing.   HENT:      Head: Normocephalic and atraumatic.      Ears:      Comments: No hearing loss  Cardiovascular:      Rate and Rhythm: Normal rate and regular rhythm.      Pulses: Normal pulses.      Heart sounds: Normal heart sounds. No murmur heard.  Pulmonary:      Effort: Pulmonary effort is normal.      Breath sounds: Normal breath sounds. No rhonchi or rales.   Abdominal:      General: Abdomen is flat. Bowel sounds are normal.      Palpations: Abdomen is soft.      Tenderness: There is no abdominal tenderness. There is no right CVA tenderness or left CVA tenderness.      Comments: Divarication of recti   Genitourinary:     Comments: Normal penis    Normal right and left scrotum    Right and left testicles and epididymis is normal    LEO.  Prostate gland is just about 20 g and benign  Musculoskeletal:         General: No swelling. Normal range of motion.      Cervical back: Normal range of motion and neck supple. No rigidity or tenderness.   Lymphadenopathy:      Cervical: No cervical adenopathy.   Skin:     General: Skin is warm.      Coloration: Skin is not jaundiced.   Neurological:      General: No focal deficit present.      Mental Status: He is alert and oriented to person, place, and time.      Motor: No weakness.      Gait: Gait normal.   Psychiatric:         Mood and Affect: Mood normal.         Behavior: Behavior normal.         Thought Content: Thought content normal.         Judgment: Judgment normal.        Result Review :                 Assessment and Plan    Diagnoses and all orders for this visit:    1. Benign prostatic hyperplasia, unspecified whether lower urinary tract symptoms present (Primary)  -     POC Urinalysis Dipstick    2. Urinary frequency    3. Urinary urgency    Ultrasound of bladder.  3.5 MHz transducer was used in the suprapubic area and volume of the urine in the bladder was measured and before voiding it was 72.2 cm³ and after he urinated was only  50 mL.  Bladder wall was normal.    I am going to give him some samples of Gemtesa and recheck him in 1 month's time to see how he is doing     Brief Urine Lab Results  (Last result in the past 365 days)      Color   Clarity   Blood   Leuk Est   Nitrite   Protein   CREAT   Urine HCG        05/26/22 1450 Yellow   Clear   Trace   Negative   Negative   Negative                  Follow Up   No follow-ups on file.  Patient was given instructions and counseling regarding his condition or for health maintenance advice. Please see specific information pulled into the AVS if appropriate.     Crispin Zendejas MD

## 2022-06-27 ENCOUNTER — OFFICE VISIT (OUTPATIENT)
Dept: UROLOGY | Facility: CLINIC | Age: 77
End: 2022-06-27

## 2022-06-27 VITALS
WEIGHT: 207 LBS | HEIGHT: 70 IN | SYSTOLIC BLOOD PRESSURE: 121 MMHG | HEART RATE: 61 BPM | TEMPERATURE: 98 F | DIASTOLIC BLOOD PRESSURE: 54 MMHG | BODY MASS INDEX: 29.63 KG/M2

## 2022-06-27 DIAGNOSIS — R35.1 NOCTURIA: Primary | ICD-10-CM

## 2022-06-27 DIAGNOSIS — Z91.09 ENVIRONMENTAL ALLERGIES: ICD-10-CM

## 2022-06-27 DIAGNOSIS — N32.81 OVERACTIVE BLADDER: ICD-10-CM

## 2022-06-27 DIAGNOSIS — N40.0 BENIGN PROSTATIC HYPERPLASIA, UNSPECIFIED WHETHER LOWER URINARY TRACT SYMPTOMS PRESENT: ICD-10-CM

## 2022-06-27 LAB
BILIRUB BLD-MCNC: NEGATIVE MG/DL
CLARITY, POC: CLEAR
COLOR UR: YELLOW
GLUCOSE UR STRIP-MCNC: NEGATIVE MG/DL
KETONES UR QL: NEGATIVE
LEUKOCYTE EST, POC: NEGATIVE
NITRITE UR-MCNC: NEGATIVE MG/ML
PH UR: 6 [PH] (ref 5–8)
PROT UR STRIP-MCNC: NEGATIVE MG/DL
RBC # UR STRIP: NEGATIVE /UL
SP GR UR: 1.02 (ref 1–1.03)
UROBILINOGEN UR QL: NORMAL

## 2022-06-27 PROCEDURE — 99213 OFFICE O/P EST LOW 20 MIN: CPT | Performed by: UROLOGY

## 2022-06-27 PROCEDURE — 81002 URINALYSIS NONAUTO W/O SCOPE: CPT | Performed by: UROLOGY

## 2022-06-27 RX ORDER — VIBEGRON 75 MG/1
75 TABLET, FILM COATED ORAL DAILY
Qty: 90 TABLET | Refills: 3 | Status: SHIPPED | OUTPATIENT
Start: 2022-06-27 | End: 2022-09-25

## 2022-06-27 RX ORDER — DESONIDE 0.5 MG/G
CREAM TOPICAL
COMMUNITY
Start: 2022-06-11

## 2022-06-27 RX ORDER — DESLORATADINE 5 MG/1
5 TABLET ORAL NIGHTLY
Qty: 90 TABLET | Refills: 3 | Status: SHIPPED | OUTPATIENT
Start: 2022-06-27 | End: 2022-11-15 | Stop reason: ALTCHOICE

## 2022-06-27 NOTE — PROGRESS NOTES
"Chief Complaint  Follow-up for nocturia and frequency    Subjective No acute distress        Deacon Neil presents to [facility] UROLOG[facility]  History of Present Illness    77-year-old white male is doing better with Gemtesa.  He has no nocturia or frequency.  AUA score is 0/35.  No dysuria or gross hematuria    Objective   Vital Signs:   /54   Pulse 61   Temp 98 °F (36.7 °C)   Ht 177.8 cm (70\")   Wt 93.9 kg (207 lb)   BMI 29.70 kg/m²     Allergies   Allergen Reactions   • Pseudoephedrine Anaphylaxis   • Lisinopril Cough      Past medical history:  has a past medical history of Anesthesia complication, Arthritis, Back pain, Bladder tumor, BPH (benign prostatic hyperplasia), Coronary artery disease involving native heart without angina pectoris (2013), Hyperlipidemia, Hypertension, essential, Psoriasis, Urinary incontinence (), and Urinary tract infection ().   Past surgical history:  has a past surgical history that includes Cataract extraction; Circumcision, non-; Cystoscopy; Tonsillectomy; Vasectomy; Colonoscopy (N/A, 02/15/2022); Total hip arthroplasty (Right, 2022); and Bladder surgery ().  Personal history: family history includes Hypertension in his mother.  Social history:  reports that he quit smoking about 9 years ago. His smoking use included cigarettes and cigarettes. He started smoking about 64 years ago. He has a 82.50 pack-year smoking history. He has never used smokeless tobacco. He reports previous alcohol use. He reports that he does not use drugs.    Review of Systems no change from before    Physical Exam  Constitutional:       General: He is not in acute distress.     Appearance: Normal appearance. He is obese. He is not ill-appearing or toxic-appearing.   HENT:      Head: Normocephalic and atraumatic.      Ears:      Comments: No loss of hearing  Genitourinary:     Penis: Normal.       Testes: Normal.   Musculoskeletal:         General: " Normal range of motion.   Skin:     General: Skin is warm.      Coloration: Skin is not jaundiced.   Neurological:      General: No focal deficit present.      Mental Status: He is alert and oriented to person, place, and time.      Motor: No weakness.      Gait: Gait normal.   Psychiatric:         Mood and Affect: Mood normal.         Behavior: Behavior normal.         Thought Content: Thought content normal.         Judgment: Judgment normal.        Result Review :                 Assessment and Plan    Diagnoses and all orders for this visit:    1. Benign prostatic hyperplasia, unspecified whether lower urinary tract symptoms present (Primary)  -     POC Urinalysis Dipstick    2. Nocturia         Brief Urine Lab Results  (Last result in the past 365 days)      Color   Clarity   Blood   Leuk Est   Nitrite   Protein   CREAT   Urine HCG        06/27/22 1040 Yellow   Clear   Negative   Negative   Negative   Negative                  Follow Up   No follow-ups on file.  Patient was given instructions and counseling regarding his condition or for health maintenance advice. Please see specific information pulled into the AVS if appropriate.     Crispin Zendejas MD

## 2022-06-29 RX ORDER — METOPROLOL SUCCINATE 25 MG/1
TABLET, EXTENDED RELEASE ORAL
Qty: 90 TABLET | Refills: 1 | Status: SHIPPED | OUTPATIENT
Start: 2022-06-29 | End: 2022-11-15 | Stop reason: SDUPTHER

## 2022-06-29 RX ORDER — VALSARTAN AND HYDROCHLOROTHIAZIDE 320; 12.5 MG/1; MG/1
TABLET, FILM COATED ORAL
Qty: 45 TABLET | Refills: 1 | Status: SHIPPED | OUTPATIENT
Start: 2022-06-29 | End: 2022-11-15 | Stop reason: SDUPTHER

## 2022-06-29 RX ORDER — ASPIRIN 81 MG/1
TABLET ORAL
Qty: 90 TABLET | Refills: 1 | Status: SHIPPED | OUTPATIENT
Start: 2022-06-29 | End: 2022-11-15 | Stop reason: SDUPTHER

## 2022-07-19 ENCOUNTER — TELEPHONE (OUTPATIENT)
Dept: UROLOGY | Facility: CLINIC | Age: 77
End: 2022-07-19

## 2022-07-19 NOTE — TELEPHONE ENCOUNTER
UNABLE TO DETERMINE WHO CALLED PATIENT, NO TELEPHONE ENCOUNTERS IN CHART.       Caller: NAT SPENCER     Relationship: SELF    Best call back number: [phone]    PT ADVISED HE IS RETURNING A CALL, HOWEVER THERE IS NOTHING IN PT CHART STATING WHO CALLED AND WHAT ABOUT. PT BELIEVES IT TO BE ABOUT HIS MEDICATION.

## 2022-08-11 ENCOUNTER — OFFICE VISIT (OUTPATIENT)
Dept: ORTHOPEDIC SURGERY | Facility: CLINIC | Age: 77
End: 2022-08-11

## 2022-08-11 VITALS — WEIGHT: 207 LBS | BODY MASS INDEX: 29.63 KG/M2 | HEIGHT: 70 IN

## 2022-08-11 DIAGNOSIS — Z96.641 STATUS POST TOTAL REPLACEMENT OF RIGHT HIP: Primary | ICD-10-CM

## 2022-08-11 PROCEDURE — 99213 OFFICE O/P EST LOW 20 MIN: CPT | Performed by: ORTHOPAEDIC SURGERY

## 2022-08-11 RX ORDER — ACETAMINOPHEN 500 MG
500 TABLET ORAL EVERY 6 HOURS PRN
COMMUNITY

## 2022-08-11 NOTE — PROGRESS NOTES
"Chief Complaint  Follow-up of the Right Hip     Subjective      Deacon Neil presents to [facility] for a follow-up of right hip. Patient is s/p right GUALBERTO by Dr. Anderson 3/18/2022. His range of motion and strength is improving with physical therapy. Patient does have some discomfort of the lateral hip with certain activities.     Allergies   Allergen Reactions   • Pseudoephedrine Anaphylaxis   • Lisinopril Cough        Social History     Socioeconomic History   • Marital status:    Tobacco Use   • Smoking status: Former Smoker     Packs/day: 1.50     Years: 55.00     Pack years: 82.50     Types: Cigarettes, Cigarettes     Start date: 1958     Quit date: 2013     Years since quittin.2   • Smokeless tobacco: Never Used   • Tobacco comment: enjoyment, then addiction, lastly distaste   Vaping Use   • Vaping Use: Never used   Substance and Sexual Activity   • Alcohol use: Not Currently     Comment: i.e., 3 mixed drinks w/1-oz. of alcohol ea.   • Drug use: Never   • Sexual activity: Defer        Review of Systems     Objective   Vital Signs:   Ht 177.8 cm (70\")   Wt 93.9 kg (207 lb)   BMI 29.70 kg/m²       Physical Exam  Constitutional:       Appearance: Normal appearance. Patient is well-developed and normal weight.   HENT:      Head: Normocephalic.      Right Ear: Hearing and external ear normal.      Left Ear: Hearing and external ear normal.      Nose: Nose normal.   Eyes:      Conjunctiva/sclera: Conjunctivae normal.   Cardiovascular:      Rate and Rhythm: Normal rate.   Pulmonary:      Effort: Pulmonary effort is normal.      Breath sounds: No wheezing or rales.   Abdominal:      Palpations: Abdomen is soft.      Tenderness: There is no abdominal tenderness.   Musculoskeletal:      Cervical back: Normal range of motion.   Skin:     Findings: No rash.   Neurological:      Mental Status: Patient is alert and oriented to person, place, and time.   Psychiatric:       "   Mood and Affect: Mood and affect normal.         Judgment: Judgment normal.       Ortho Exam      RIGHT HIP: Good tone of hip flexors, hip extensors, hip adductor, hip abductors. Mild tenderness of lateral hip. Calf soft. Non-antalgic gait. Dorsal Pedal Pulse 2+, posterior tibialis pulse 2+. Sensation grossly intact. Neurovascular intact.  No groin pain with hip motion.       Procedures      Imaging Results (Most Recent)     Procedure Component Value Units Date/Time    XR Hip With or Without Pelvis 2 - 3 View Right [[id number]] Resulted: 08/11/22 1322     Updated: 08/11/22 1324           Result Review :     X-Ray Report:  Right hip(s) X-Ray  Indication: Evaluation of right hip pain   AP and Lateral view(s)  Findings: Intact right total hip arthroplasty with no evidence of wearing or loosening.   Prior studies available for comparison: yes     Assessment and Plan     Diagnoses and all orders for this visit:    1. Status post total replacement of right hip (Primary)  -     XR Hip With or Without Pelvis 2 - 3 View Right        Continue therapy, wean out of therapy as tolerated. Right now therapy is giving him relief and he notices improvement with them. Repeat films next visit.     Call or return if worsening symptoms.    Follow Up     6 months.       Patient was given instructions and counseling regarding his condition or for health maintenance advice. Please see specific information pulled into the AVS if appropriate.     Scribed for Bhavesh Anderson MD by Ilda Graham.  08/11/22   13:27 EDT    I have personally performed the services described in this document as scribed by the above individual and it is both accurate and complete. Bhavesh Anderson MD 08/11/22

## 2022-08-24 ENCOUNTER — LAB (OUTPATIENT)
Dept: LAB | Facility: HOSPITAL | Age: 77
End: 2022-08-24

## 2022-08-24 DIAGNOSIS — E78.5 HYPERLIPIDEMIA, UNSPECIFIED HYPERLIPIDEMIA TYPE: Chronic | ICD-10-CM

## 2022-08-24 LAB
ALBUMIN SERPL-MCNC: 4.6 G/DL (ref 3.5–5.2)
ALBUMIN/GLOB SERPL: 1.9 G/DL
ALP SERPL-CCNC: 80 U/L (ref 39–117)
ALT SERPL W P-5'-P-CCNC: 14 U/L (ref 1–41)
ANION GAP SERPL CALCULATED.3IONS-SCNC: 14 MMOL/L (ref 5–15)
AST SERPL-CCNC: 18 U/L (ref 1–40)
BILIRUB SERPL-MCNC: 0.5 MG/DL (ref 0–1.2)
BUN SERPL-MCNC: 27 MG/DL (ref 8–23)
BUN/CREAT SERPL: 33.8 (ref 7–25)
CALCIUM SPEC-SCNC: 9.6 MG/DL (ref 8.6–10.5)
CHLORIDE SERPL-SCNC: 105 MMOL/L (ref 98–107)
CHOLEST SERPL-MCNC: 111 MG/DL (ref 0–200)
CO2 SERPL-SCNC: 22 MMOL/L (ref 22–29)
CREAT SERPL-MCNC: 0.8 MG/DL (ref 0.76–1.27)
EGFRCR SERPLBLD CKD-EPI 2021: 90.6 ML/MIN/1.73
GLOBULIN UR ELPH-MCNC: 2.4 GM/DL
GLUCOSE SERPL-MCNC: 89 MG/DL (ref 65–99)
HDLC SERPL-MCNC: 29 MG/DL (ref 40–60)
LDLC SERPL CALC-MCNC: 64 MG/DL (ref 0–100)
LDLC/HDLC SERPL: 2.17 {RATIO}
POTASSIUM SERPL-SCNC: 4.5 MMOL/L (ref 3.5–5.2)
PROT SERPL-MCNC: 7 G/DL (ref 6–8.5)
SODIUM SERPL-SCNC: 141 MMOL/L (ref 136–145)
TRIGL SERPL-MCNC: 95 MG/DL (ref 0–150)
VLDLC SERPL-MCNC: 18 MG/DL (ref 5–40)

## 2022-08-24 PROCEDURE — 80053 COMPREHEN METABOLIC PANEL: CPT

## 2022-08-24 PROCEDURE — 36415 COLL VENOUS BLD VENIPUNCTURE: CPT

## 2022-08-24 PROCEDURE — 80061 LIPID PANEL: CPT

## 2022-08-25 ENCOUNTER — PATIENT MESSAGE (OUTPATIENT)
Dept: CARDIOLOGY | Facility: CLINIC | Age: 77
End: 2022-08-25

## 2022-08-25 NOTE — PROGRESS NOTES
Labs done on 8/24/2022 showed well-controlled cholesterol levels.  Liver enzymes and kidney functions are within normal limits.  LDL, bad cholesterol is 64 which is at goal.  It was 100 previously.  Recommend to continue current dose of rosuvastatin and Zetia.  Follow-up as scheduled earlier.      Electronically signed by Randell Albert MD, 08/25/22, 1:54 PM EDT.

## 2022-09-22 RX ORDER — EZETIMIBE 10 MG/1
10 TABLET ORAL DAILY
Qty: 90 TABLET | Refills: 3 | Status: SHIPPED | OUTPATIENT
Start: 2022-09-22

## 2022-11-15 ENCOUNTER — OFFICE VISIT (OUTPATIENT)
Dept: CARDIOLOGY | Facility: CLINIC | Age: 77
End: 2022-11-15

## 2022-11-15 VITALS
HEART RATE: 73 BPM | SYSTOLIC BLOOD PRESSURE: 145 MMHG | HEIGHT: 70 IN | BODY MASS INDEX: 30.32 KG/M2 | DIASTOLIC BLOOD PRESSURE: 80 MMHG | WEIGHT: 211.8 LBS

## 2022-11-15 DIAGNOSIS — I25.10 CORONARY ARTERY DISEASE INVOLVING NATIVE CORONARY ARTERY OF NATIVE HEART WITHOUT ANGINA PECTORIS: Primary | Chronic | ICD-10-CM

## 2022-11-15 DIAGNOSIS — I10 HYPERTENSION, ESSENTIAL: Chronic | ICD-10-CM

## 2022-11-15 DIAGNOSIS — E78.2 MIXED HYPERLIPIDEMIA: Chronic | ICD-10-CM

## 2022-11-15 PROCEDURE — 99214 OFFICE O/P EST MOD 30 MIN: CPT | Performed by: INTERNAL MEDICINE

## 2022-11-15 RX ORDER — METOPROLOL SUCCINATE 25 MG/1
25 TABLET, EXTENDED RELEASE ORAL DAILY
Qty: 90 TABLET | Refills: 3 | Status: SHIPPED | OUTPATIENT
Start: 2022-11-15

## 2022-11-15 RX ORDER — VALSARTAN AND HYDROCHLOROTHIAZIDE 320; 12.5 MG/1; MG/1
0.5 TABLET, FILM COATED ORAL DAILY
Qty: 45 TABLET | Refills: 3 | Status: SHIPPED | OUTPATIENT
Start: 2022-11-15

## 2022-11-15 RX ORDER — ASPIRIN 81 MG/1
81 TABLET ORAL DAILY
Qty: 90 TABLET | Refills: 3 | Status: SHIPPED | OUTPATIENT
Start: 2022-11-15

## 2022-11-27 PROBLEM — R19.4 ALTERED BOWEL HABITS: Status: RESOLVED | Noted: 2022-02-10 | Resolved: 2022-11-27

## 2022-11-27 PROBLEM — Z47.89 AFTERCARE FOLLOWING SURGERY OF THE MUSCULOSKELETAL SYSTEM: Status: RESOLVED | Noted: 2022-04-05 | Resolved: 2022-11-27

## 2022-11-27 PROBLEM — Z12.11 SCREENING FOR COLON CANCER: Status: RESOLVED | Noted: 2022-02-10 | Resolved: 2022-11-27

## 2022-11-27 PROBLEM — K59.00 CONSTIPATION: Status: RESOLVED | Noted: 2022-02-10 | Resolved: 2022-11-27

## 2022-11-27 NOTE — ASSESSMENT & PLAN NOTE
Recent LDL is 64, which is at goal and better than previous levels.  Continue rosuvastatin, Zetia and co-Q10.

## 2022-11-27 NOTE — ASSESSMENT & PLAN NOTE
He is currently stable with no angina-like symptoms.  Continue aspirin, statin and beta-blocker at the current dose.

## 2022-11-27 NOTE — ASSESSMENT & PLAN NOTE
Blood pressure reasonably well controlled.  We will continue metoprolol succinate and Diovan HCT at the current dose.  Labs done in August showed normal creatinine and electrolytes.

## 2022-11-27 NOTE — PROGRESS NOTES
CARDIOLOGY FOLLOW-UP PROGRESS NOTE        Chief Complaint  Coronary Artery Disease (Follow-up), Hypertension, and Hyperlipidemia    Subjective            Deacon Neil presents to [facility]  History of Present Illness      Mr Neil is here for routine 6-month follow-up visit.  Overall he feels fine.  Denies any recent episodes of chest pain, shortness of breath or palpitations.  He is taking all medications as prescribed.       Past History:    1) Coronary artery disease. Cardiac catheterization on 2013 showed 80-85% stenosis of the mid LAD, 70-75% lesion of the diagonal 1 branch, and 40% stenosis of the proximal left circumflex artery. Medical management was advised at that point; 2) Hyperlipidemia; 3) Hypertension; 4) Psoriasis.     Medical History:  Past Medical History:   Diagnosis Date   • Anesthesia complication     heart rate dropped    • Arthritis     hips   • Back pain    • Bladder tumor     MYOFIBROBLASTIC TUMOR   • BPH (benign prostatic hyperplasia)    • Coronary artery disease involving native heart without angina pectoris 2013     Coronary artery disease. Cardiac catheterization on 2013 showed 80-85% stenosis of the mid LAD, 70-75% lesion of the diagonal 1 branch, and 40% stenosis of the proximal left circumflex artery. Medical management was advised at that point;    • Hyperlipidemia    • Hypertension, essential    • Psoriasis    • Urinary incontinence     uti   • Urinary tract infection        Surgical History: has a past surgical history that includes Cataract extraction; Circumcision, non-; Cystoscopy; Tonsillectomy; Vasectomy; Colonoscopy (N/A, 02/15/2022); Total hip arthroplasty (Right, 2022); and Bladder surgery ().     Family History: family history includes Hypertension in his mother.     Social History: reports that he quit smoking about 9 years ago. His smoking use included cigarettes. He started smoking about  "64 years ago. He has a 82.50 pack-year smoking history. He has never used smokeless tobacco. He reports that he does not currently use alcohol. He reports that he does not use drugs.    Allergies: Pseudoephedrine and Lisinopril    Current Outpatient Medications on File Prior to Visit   Medication Sig   • acetaminophen (TYLENOL) 500 MG tablet Take 500 mg by mouth Every 6 (Six) Hours As Needed for Mild Pain .   • coenzyme Q10 100 MG capsule Take 200 mg by mouth Every Morning. INST PER ANESTHESIA PROTOCOL   • Cosentyx Sensoready, 300 MG, 150 MG/ML solution auto-injector 300 mg Every 30 (Thirty) Days. EVERY 4TH WEEK/TAKES FOR PLAQUE PSORIASIS   • desonide (DESOWEN) 0.05 % cream APPLY TOPICALLY TO GROIN TWICE DAILY AS NEEDED FOR PSORIASIS   • econazole nitrate (SPECTAZOLE) 1 % cream econazole 1 % topical cream apply to the affected and surrounding areas of skin by topical route once daily   Active   • ezetimibe (ZETIA) 10 MG tablet Take 1 tablet by mouth Daily.   • guaiFENesin 100 MG pack Take 400 mg by mouth Every 4 (Four) Hours As Needed.   • rosuvastatin (CRESTOR) 10 MG tablet Take 1 tablet by mouth Daily.     No current facility-administered medications on file prior to visit.          Review of Systems   Respiratory: Negative for cough, shortness of breath and wheezing.    Cardiovascular: Negative for chest pain, palpitations and leg swelling.   Gastrointestinal: Negative for nausea and vomiting.   Neurological: Negative for dizziness and syncope.        Objective     /80   Pulse 73   Ht 177.8 cm (70\")   Wt 96.1 kg (211 lb 12.8 oz)   BMI 30.39 kg/m²       Physical Exam    General : Alert, awake, no acute distress  Neck : Supple, no carotid bruit, no jugular venous distention  CVS : Regular rate and rhythm, no murmur, rubs or gallops  Lungs: Clear to auscultation bilaterally, no crackles or rhonchi  Abdomen: Soft, nontender, bowel sounds heard in all 4 quadrants  Extremities: Warm, well-perfused, no pedal " edema    Result Review :     The following data was reviewed by: Randell Albert MD on 11/15/2022:    CMP    CMP 3/24/22 5/19/22 8/24/22   Glucose 109 (A) 105 (A) 89   BUN 13 20 27 (A)   Creatinine 0.81 0.85 0.80   Sodium 136 139 141   Potassium 3.5 3.9 4.5   Chloride 100 102 105   Calcium 9.0 9.9 9.6   Albumin 3.70 4.30 4.60   Total Bilirubin 0.4 0.5 0.5   Alkaline Phosphatase 86 92 80   AST (SGOT) 32 16 18   ALT (SGPT) 34 21 14   (A) Abnormal value            CBC    CBC 3/8/22 3/19/22 3/24/22   WBC 7.11  11.51 (A)   RBC 4.80  4.15   Hemoglobin 13.8 12.0 (A) 11.8 (A)   Hematocrit 41.2 35.7 (A) 36.1 (A)   MCV 85.8  87.0   MCH 28.8  28.4   MCHC 33.5  32.7   RDW 12.8  12.1 (A)   Platelets 321  365   (A) Abnormal value              Lipid Panel    Lipid Panel 5/19/22 8/24/22   Total Cholesterol 154 111   Triglycerides 112 95   HDL Cholesterol 33 (A) 29 (A)   VLDL Cholesterol 21 18   LDL Cholesterol  100 64   LDL/HDL Ratio 2.99 2.17   (A) Abnormal value                 Data reviewed: Cardiology studies                             Assessment and Plan        Diagnoses and all orders for this visit:    1. Coronary artery disease involving native coronary artery of native heart without angina pectoris (Primary)  Assessment & Plan:  He is currently stable with no angina-like symptoms.  Continue aspirin, statin and beta-blocker at the current dose.    Orders:  -     aspirin 81 MG EC tablet; Take 1 tablet by mouth Daily.  Dispense: 90 tablet; Refill: 3  -     Lipid Panel; Future    2. Mixed hyperlipidemia  Assessment & Plan:  Recent LDL is 64, which is at goal and better than previous levels.  Continue rosuvastatin, Zetia and co-Q10.    Orders:  -     Comprehensive Metabolic Panel; Future  -     Lipid Panel; Future    3. Hypertension, essential  Assessment & Plan:  Blood pressure reasonably well controlled.  We will continue metoprolol succinate and Diovan HCT at the current dose.  Labs done in August showed normal creatinine  and electrolytes.    Orders:  -     valsartan-hydrochlorothiazide (DIOVAN-HCT) 320-12.5 MG per tablet; Take 0.5 tablets by mouth Daily.  Dispense: 45 tablet; Refill: 3  -     metoprolol succinate XL (TOPROL-XL) 25 MG 24 hr tablet; Take 1 tablet by mouth Daily.  Dispense: 90 tablet; Refill: 3            Follow Up     Return in about 6 months (around 5/15/2023) for Next scheduled follow up.    Patient was given instructions and counseling regarding his condition or for health maintenance advice. Please see specific information pulled into the AVS if appropriate.

## 2023-01-04 ENCOUNTER — OFFICE VISIT (OUTPATIENT)
Dept: UROLOGY | Facility: CLINIC | Age: 78
End: 2023-01-04
Payer: COMMERCIAL

## 2023-01-04 VITALS
WEIGHT: 218 LBS | DIASTOLIC BLOOD PRESSURE: 61 MMHG | TEMPERATURE: 97.5 F | HEART RATE: 80 BPM | BODY MASS INDEX: 31.21 KG/M2 | SYSTOLIC BLOOD PRESSURE: 146 MMHG | HEIGHT: 70 IN

## 2023-01-04 DIAGNOSIS — N40.0 BENIGN PROSTATIC HYPERPLASIA, UNSPECIFIED WHETHER LOWER URINARY TRACT SYMPTOMS PRESENT: Primary | ICD-10-CM

## 2023-01-04 DIAGNOSIS — R35.0 URINARY FREQUENCY: ICD-10-CM

## 2023-01-04 LAB
BILIRUB BLD-MCNC: NEGATIVE MG/DL
CLARITY, POC: CLEAR
COLOR UR: YELLOW
EXPIRATION DATE: NORMAL
GLUCOSE UR STRIP-MCNC: NEGATIVE MG/DL
KETONES UR QL: NEGATIVE
LEUKOCYTE EST, POC: NEGATIVE
Lab: NORMAL
NITRITE UR-MCNC: NEGATIVE MG/ML
PH UR: 6 [PH] (ref 5–8)
PROT UR STRIP-MCNC: NEGATIVE MG/DL
RBC # UR STRIP: NEGATIVE /UL
SP GR UR: 1.02 (ref 1–1.03)
UROBILINOGEN UR QL: NORMAL

## 2023-01-04 PROCEDURE — 99213 OFFICE O/P EST LOW 20 MIN: CPT | Performed by: UROLOGY

## 2023-01-04 PROCEDURE — 81003 URINALYSIS AUTO W/O SCOPE: CPT | Performed by: UROLOGY

## 2023-01-04 RX ORDER — VIBEGRON 75 MG/1
TABLET, FILM COATED ORAL
COMMUNITY
Start: 2022-05-26

## 2023-01-04 RX ORDER — DESLORATADINE 5 MG/1
5 TABLET ORAL NIGHTLY
COMMUNITY
Start: 2022-12-23

## 2023-01-04 NOTE — PROGRESS NOTES
Chief Complaint  Follow-up for urinary urgency.    Patient was on Gemtesa which is helping him.    Subjective  Patient is doing well with Gemtesa 75 mg daily        Deacon Neil presents to [facility]  History of Present Illness    Urinary frequency is improved with Gemtesa 75 mg daily.  Patient has no dysuria or gross hematuria.  No history of prostate cancer in the family    Objective No acute distress  Vital Signs:   /61   Pulse 80   Temp 97.5 °F (36.4 °C)   Ht 177.8 cm (70\")   Wt 98.9 kg (218 lb)   BMI 31.28 kg/m²     Allergies   Allergen Reactions   • Pseudoephedrine Anaphylaxis   • Lisinopril Cough      Past medical history:  has a past medical history of Anesthesia complication, Arthritis, Back pain, Bladder tumor, BPH (benign prostatic hyperplasia), Coronary artery disease involving native heart without angina pectoris (2013), Hyperlipidemia, Hypertension, essential, Psoriasis, Urinary incontinence (), and Urinary tract infection ().   Past surgical history:  has a past surgical history that includes Cataract extraction; Circumcision, non-; Cystoscopy; Tonsillectomy; Vasectomy; Colonoscopy (N/A, 02/15/2022); Total hip arthroplasty (Right, 2022); and Bladder surgery ().  Personal history: family history includes Hypertension in his mother.  Social history:  reports that he quit smoking about 9 years ago. His smoking use included cigarettes. He started smoking about 64 years ago. He has a 82.50 pack-year smoking history. He has never used smokeless tobacco. He reports that he does not currently use alcohol. He reports that he does not use drugs.    Review of Systems    No change from before    Physical Exam  Constitutional:       Appearance: Normal appearance. He is normal weight. He is not ill-appearing or toxic-appearing.   HENT:      Head: Normocephalic and atraumatic.      Ears:      Comments: No hearing loss  Cardiovascular:       Rate and Rhythm: Normal rate and regular rhythm.      Pulses: Normal pulses.      Heart sounds: Normal heart sounds. No murmur heard.  Pulmonary:      Effort: Pulmonary effort is normal.      Breath sounds: Normal breath sounds. No rhonchi or rales.   Abdominal:      Palpations: Abdomen is soft. There is no mass.      Tenderness: There is no abdominal tenderness. There is no right CVA tenderness or left CVA tenderness.      Hernia: No hernia is present.   Genitourinary:     Penis: Normal.       Testes: Normal.      Prostate: Normal.      Rectum: Normal.      Comments: Right and left scrotum is normal.    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    LEO.  Prostate gland is just about 25 g and benign  Musculoskeletal:         General: Tenderness present. Normal range of motion.      Cervical back: Normal range of motion and neck supple. No rigidity or tenderness.      Comments: Tender right hip joint   Lymphadenopathy:      Cervical: No cervical adenopathy.   Skin:     General: Skin is warm.      Coloration: Skin is not jaundiced.   Neurological:      General: No focal deficit present.      Mental Status: He is alert and oriented to person, place, and time.      Motor: No weakness.      Gait: Gait abnormal.   Psychiatric:         Mood and Affect: Mood normal.         Behavior: Behavior normal.         Thought Content: Thought content normal.         Judgment: Judgment normal.        Result Review :                 Assessment and Plan    Diagnoses and all orders for this visit:    1. Benign prostatic hyperplasia, unspecified whether lower urinary tract symptoms present (Primary)  -     POC Urinalysis Dipstick, Automated    2. Urinary frequency    Will continue Gemtesa 75 mg daily.Refill Clarinex 5 mg daily at his request.  We will recheck him in 6 months time.  We will do PSA just to make sure does not have prostate cancer.     Brief Urine Lab Results  (Last result in the past 365 days)      Color   Clarity   Blood   Leuk  Est   Nitrite   Protein   CREAT   Urine HCG        01/04/23 1031 Yellow   Clear   Negative   Negative   Negative   Negative                  Follow Up   No follow-ups on file.  Patient was given instructions and counseling regarding his condition or for health maintenance advice. Please see specific information pulled into the AVS if appropriate.     Crispin Zendejas MD

## 2023-01-19 ENCOUNTER — OFFICE VISIT (OUTPATIENT)
Dept: FAMILY MEDICINE CLINIC | Facility: CLINIC | Age: 78
End: 2023-01-19
Payer: COMMERCIAL

## 2023-01-19 VITALS
OXYGEN SATURATION: 97 % | TEMPERATURE: 97.4 F | RESPIRATION RATE: 20 BRPM | HEART RATE: 57 BPM | DIASTOLIC BLOOD PRESSURE: 60 MMHG | SYSTOLIC BLOOD PRESSURE: 120 MMHG

## 2023-01-19 DIAGNOSIS — R05.1 ACUTE COUGH: Primary | ICD-10-CM

## 2023-01-19 DIAGNOSIS — Z87.891 FORMER SMOKER: ICD-10-CM

## 2023-01-19 PROCEDURE — 99213 OFFICE O/P EST LOW 20 MIN: CPT | Performed by: NURSE PRACTITIONER

## 2023-01-19 RX ORDER — PREDNISONE 20 MG/1
20 TABLET ORAL 2 TIMES DAILY
Qty: 10 TABLET | Refills: 0 | Status: SHIPPED | OUTPATIENT
Start: 2023-01-19 | End: 2023-01-24

## 2023-01-19 RX ORDER — AZITHROMYCIN 250 MG/1
TABLET, FILM COATED ORAL
Qty: 6 TABLET | Refills: 0 | OUTPATIENT
Start: 2023-01-19 | End: 2023-03-31

## 2023-01-19 NOTE — PROGRESS NOTES
Answers for HPI/ROS submitted by the patient on 2023  What is the primary reason for your visit?: Cough  Chronicity: new  Onset: 1 to 4 weeks ago  Progression since onset: waxing and waning  Frequency: every few hours  Cough characteristics: productive of sputum  nasal congestion: Yes  rhinorrhea: Yes  Risk factors for lung disease: smoking/tobacco exposure    Chief Complaint  Cough (States congestion is worse in morning and after taking nap/Productive yellow sputum)    Subjective          Deacon Neil presents to [facility]  History of Present Illness  He started a couple weeks ago with some congestion.  He has been having increased cough and more productive in the last week.  He is not having any complaints of shortness of breath.  He said though his cough just gets worse in the mornings or if he is lying down at times then he will get more yellow thick sputum.  Has not had any fevers recently.  He has not been around anyone with positive flu or COVID.  He does see cardiology currently.    Depression: Not at risk   • PHQ-2 Score: 0           Allergies  Pseudoephedrine and Lisinopril    Social History     Tobacco Use   • Smoking status: Former     Packs/day: 1.50     Years: 55.00     Pack years: 82.50     Types: Cigarettes     Start date: 1958     Quit date: 2013     Years since quittin.7   • Smokeless tobacco: Never   • Tobacco comments:     enjoyment, then addiction, lastly distaste   Vaping Use   • Vaping Use: Never used   Substance Use Topics   • Alcohol use: Not Currently   • Drug use: Never       Family History   Problem Relation Age of Onset   • Hypertension Mother          age 90 in her sleep.   • Colon cancer Neg Hx    • Malig Hyperthermia Neg Hx         Health Maintenance Due   Topic Date Due   • TDAP/TD VACCINES (1 - Tdap) Never done   • Pneumococcal Vaccine 65+ (1 - PCV) Never done   • ZOSTER VACCINE (2 of 2) 2018   • HEPATITIS C  SCREENING  Never done   • ANNUAL PHYSICAL  Never done        Immunization History   Administered Date(s) Administered   • COVID-19 (MODERNA) 1st, 2nd, 3rd Dose Only 02/13/2021, 03/14/2021, 08/17/2021, 03/02/2022   • COVID-19 (PFIZER) PURPLE CAP 09/07/2022   • Fluad Quad 65+ 08/15/2022   • Fluzone High-Dose 65+yrs 08/25/2020, 08/16/2021   • Shingrix 11/02/2018, 11/02/2018       Review of Systems   Constitutional: Negative for fatigue.   HENT: Positive for rhinorrhea.    Respiratory: Positive for cough.         Objective       Vitals:    01/19/23 1352   BP: 120/60   Pulse: 57   Resp: 20   Temp: 97.4 °F (36.3 °C)   SpO2: 97%       There is no height or weight on file to calculate BMI.         Physical Exam  Vitals reviewed.   Constitutional:       Appearance: Normal appearance. He is well-developed.   Cardiovascular:      Rate and Rhythm: Normal rate and regular rhythm.      Heart sounds: Normal heart sounds. No murmur heard.  Pulmonary:      Effort: Pulmonary effort is normal.      Breath sounds: Wheezing and rhonchi present.   Neurological:      Mental Status: He is alert and oriented to person, place, and time.      Cranial Nerves: No cranial nerve deficit.      Motor: No weakness.   Psychiatric:         Mood and Affect: Mood and affect normal.             Result Review :     The following data was reviewed by: ARUN Chambers on 01/19/2023:                     Assessment and Plan      Diagnoses and all orders for this visit:    1. Acute cough (Primary)  -     predniSONE (DELTASONE) 20 MG tablet; Take 1 tablet by mouth 2 (Two) Times a Day for 5 days.  Dispense: 10 tablet; Refill: 0  -     azithromycin (Zithromax Z-Denilson) 250 MG tablet; Take 2 tablets by mouth on day 1, then 1 tablet daily on days 2-5  Dispense: 6 tablet; Refill: 0    2. Former smoker            Follow Up     Return if symptoms worsen or fail to improve.  We will do abx and steorids for 5 days then he will call me or message me and let me  know how the cough is doing and if it is better we will do a LOW dose CT scan for prevention or if it is not better we will do a CT diag test.  Patient was given instructions and counseling regarding his condition or for health maintenance advice. Please see specific information pulled into the AVS if appropriate.     Parts of this note are electronic transcriptions/translations of spoken language to printed text using the Dragon Dictation system.          Ghazala Freitas, APRN  01/19/2023

## 2023-02-14 ENCOUNTER — OFFICE VISIT (OUTPATIENT)
Dept: ORTHOPEDIC SURGERY | Facility: CLINIC | Age: 78
End: 2023-02-14
Payer: COMMERCIAL

## 2023-02-14 VITALS — HEIGHT: 70 IN | BODY MASS INDEX: 31.21 KG/M2 | HEART RATE: 76 BPM | WEIGHT: 218 LBS | OXYGEN SATURATION: 97 %

## 2023-02-14 DIAGNOSIS — M16.12 OSTEOARTHRITIS OF LEFT HIP, UNSPECIFIED OSTEOARTHRITIS TYPE: ICD-10-CM

## 2023-02-14 DIAGNOSIS — M25.551 BILATERAL HIP PAIN: Primary | ICD-10-CM

## 2023-02-14 DIAGNOSIS — M25.552 BILATERAL HIP PAIN: Primary | ICD-10-CM

## 2023-02-14 DIAGNOSIS — Z96.641 STATUS POST TOTAL REPLACEMENT OF RIGHT HIP: ICD-10-CM

## 2023-02-14 PROCEDURE — 99213 OFFICE O/P EST LOW 20 MIN: CPT | Performed by: ORTHOPAEDIC SURGERY

## 2023-02-14 NOTE — PROGRESS NOTES
"Chief Complaint  Follow-up of the Left Hip and Follow-up of the Right Hip     Subjective      Deacon Neil presents to [facility] for follow up for bilateral hips.   He had surgery a year ago on a right total hip arthroplasty.  He is doing well.  He has some osteo in the left hip.  He has no concerns except some weakness in bilateral hips and is requesting physical therapy.     Allergies   Allergen Reactions   • Pseudoephedrine Anaphylaxis   • Lisinopril Cough        Social History     Socioeconomic History   • Marital status:    Tobacco Use   • Smoking status: Former     Packs/day: 1.50     Years: 55.00     Pack years: 82.50     Types: Cigarettes     Start date: 1958     Quit date: 2013     Years since quittin.7   • Smokeless tobacco: Never   • Tobacco comments:     enjoyment, then addiction, lastly distaste   Vaping Use   • Vaping Use: Never used   Substance and Sexual Activity   • Alcohol use: Not Currently   • Drug use: Never   • Sexual activity: Not Currently        Review of Systems     Objective   Vital Signs:   Pulse 76   Ht 177.8 cm (70\")   Wt 98.9 kg (218 lb)   SpO2 97%   BMI 31.28 kg/m²       Physical Exam  Constitutional:       Appearance: Normal appearance. Patient is well-developed and normal weight.   HENT:      Head: Normocephalic.      Right Ear: Hearing and external ear normal.      Left Ear: Hearing and external ear normal.      Nose: Nose normal.   Eyes:      Conjunctiva/sclera: Conjunctivae normal.   Cardiovascular:      Rate and Rhythm: Normal rate.   Pulmonary:      Effort: Pulmonary effort is normal.      Breath sounds: No wheezing or rales.   Abdominal:      Palpations: Abdomen is soft.      Tenderness: There is no abdominal tenderness.   Musculoskeletal:      Cervical back: Normal range of motion.   Skin:     Findings: No rash.   Neurological:      Mental Status: Patient is alert and oriented to person, place, and time. "   Psychiatric:         Mood and Affect: Mood and affect normal.         Judgment: Judgment normal.       Ortho Exam      BILATERAL HIPS Hip Flexion 100. Internal rotation 50. External rotation 50. No skin discoloration, atrophy or swelling. Positive EHL, FHL, GS, and TA. Sensation intact to all 5 nerves of the foot. Negative straight leg raise. Leg lengths appear equal. Ambulates with Non-antalgic Negative Navya. Negative Shavon. Good strength to hamstrings, quadriceps, dorsiflexors, and plantar flexors. Knee Extensor Mechanism  intact         Procedures      Imaging Results (Most Recent)     Procedure Component Value Units Date/Time    XR Hips Bilateral With or Without Pelvis 3-4 View [[id number]] Resulted: 02/14/23 1307     Updated: 02/14/23 1309           Result Review :{Labs  Result Review  Imaging  Med Tab  Media  Procedures     X-Ray Report:  BILATERAL hip X-Ray  Indication: Evaluation of bilateral hips.   AP/Lateral view(s)  Findings: Intact Right hip replacement.  Moderate arthritis to left hip.   Prior studies available for comparison: No          Assessment and Plan     Diagnoses and all orders for this visit:    1. Bilateral hip pain (Primary)  -     XR Hips Bilateral With or Without Pelvis 3-4 View    2. Status post total replacement of right hip  -     Ambulatory Referral to Physical Therapy Evaluate and treat, Ortho    3. Osteoarthritis of left hip, unspecified osteoarthritis type  -     Ambulatory Referral to Physical Therapy Evaluate and treat, Ortho        Discussed the treatment plan with the patient. I reviewed the X-rays that were obtained today with the patient. Prescribed physical therapy for strengthening of bilateral hips.     Call or return if worsening symptoms.    Follow Up     PRN      Patient was given instructions and counseling regarding his condition or for health maintenance advice. Please see specific information pulled into the AVS if appropriate.     Scribed for Bhavesh Anderson,  MD by Monique Tamez MA.  02/14/23   13:03 EST    I have personally performed the services described in this document as scribed by the above individual and it is both accurate and complete. Bhavesh Anderson MD 02/14/23

## 2023-04-11 ENCOUNTER — OFFICE VISIT (OUTPATIENT)
Dept: FAMILY MEDICINE CLINIC | Facility: CLINIC | Age: 78
End: 2023-04-11
Payer: COMMERCIAL

## 2023-04-11 VITALS
BODY MASS INDEX: 31.21 KG/M2 | DIASTOLIC BLOOD PRESSURE: 64 MMHG | TEMPERATURE: 98 F | SYSTOLIC BLOOD PRESSURE: 134 MMHG | HEIGHT: 70 IN | WEIGHT: 218 LBS | HEART RATE: 102 BPM | OXYGEN SATURATION: 95 %

## 2023-04-11 DIAGNOSIS — J02.9 SORE THROAT: ICD-10-CM

## 2023-04-11 DIAGNOSIS — J06.9 UPPER RESPIRATORY INFECTION WITH COUGH AND CONGESTION: Primary | ICD-10-CM

## 2023-04-11 LAB
EXPIRATION DATE: NORMAL
EXPIRATION DATE: NORMAL
FLUAV AG UPPER RESP QL IA.RAPID: NOT DETECTED
FLUBV AG UPPER RESP QL IA.RAPID: NOT DETECTED
INTERNAL CONTROL: NORMAL
INTERNAL CONTROL: NORMAL
Lab: NORMAL
Lab: NORMAL
S PYO AG THROAT QL: NEGATIVE
SARS-COV-2 AG UPPER RESP QL IA.RAPID: NOT DETECTED

## 2023-04-11 PROCEDURE — 99213 OFFICE O/P EST LOW 20 MIN: CPT | Performed by: NURSE PRACTITIONER

## 2023-04-11 PROCEDURE — 87880 STREP A ASSAY W/OPTIC: CPT | Performed by: NURSE PRACTITIONER

## 2023-04-11 PROCEDURE — 87428 SARSCOV & INF VIR A&B AG IA: CPT | Performed by: NURSE PRACTITIONER

## 2023-04-11 RX ORDER — PREDNISONE 20 MG/1
TABLET ORAL
Qty: 9 TABLET | Refills: 0 | Status: SHIPPED | OUTPATIENT
Start: 2023-04-11 | End: 2023-04-16

## 2023-04-11 RX ORDER — BENZONATATE 200 MG/1
200 CAPSULE ORAL 3 TIMES DAILY PRN
Qty: 21 CAPSULE | Refills: 0 | Status: SHIPPED | OUTPATIENT
Start: 2023-04-11 | End: 2023-04-18

## 2023-04-11 RX ORDER — AZITHROMYCIN 250 MG/1
TABLET, FILM COATED ORAL
Qty: 6 TABLET | Refills: 0 | Status: SHIPPED | OUTPATIENT
Start: 2023-04-11 | End: 2023-04-16

## 2023-04-11 NOTE — PROGRESS NOTES
"Chief Complaint  Fever, Cough, Sore Throat, Chills, and Nasal Congestion    Subjective          Deacon Neil, 77 y.o. male presents to [facility]  History of Present Illness   Patient presents today for an acute visit.  He is a patient of ARUN Chambers.  He is complaining of sore throat and congestion.  He states his symptoms have started over 5 days ago but have gradually gotten worse.  He states this morning he had chills and fever.  He is afebrile in the office today.  He is not taking any over-the-counter cold medications.  He has an allergy to pseudoephedrine.    In office COVID/flu and strep swabs were all negative.       Tobacco Use: Medium Risk   • Smoking Tobacco Use: Former   • Smokeless Tobacco Use: Never   • Passive Exposure: Not on file      Objective   Vital Signs:   /64   Pulse 102   Temp 98 °F (36.7 °C)   Ht 177.8 cm (70\")   Wt 98.9 kg (218 lb)   SpO2 95%   BMI 31.28 kg/m²       Current Outpatient Medications:   •  acetaminophen (TYLENOL) 500 MG tablet, Take 1 tablet by mouth Every 6 (Six) Hours As Needed for Mild Pain., Disp: , Rfl:   •  aspirin 81 MG EC tablet, Take 1 tablet by mouth Daily., Disp: 90 tablet, Rfl: 3  •  coenzyme Q10 100 MG capsule, Take 2 capsules by mouth Every Morning. INST PER ANESTHESIA PROTOCOL, Disp: , Rfl:   •  Cosentyx Sensoready, 300 MG, 150 MG/ML solution auto-injector, 2 mL Every 30 (Thirty) Days. EVERY 4TH WEEK/TAKES FOR PLAQUE PSORIASIS, Disp: , Rfl:   •  desloratadine (CLARINEX) 5 MG tablet, Take 1 tablet by mouth Every Night., Disp: , Rfl:   •  desonide (DESOWEN) 0.05 % cream, APPLY TOPICALLY TO GROIN TWICE DAILY AS NEEDED FOR PSORIASIS, Disp: , Rfl:   •  econazole nitrate (SPECTAZOLE) 1 % cream, econazole 1 % topical cream apply to the affected and surrounding areas of skin by topical route once daily   Active, Disp: , Rfl:   •  ezetimibe (ZETIA) 10 MG tablet, Take 1 tablet by mouth Daily., Disp: 90 " tablet, Rfl: 3  •  guaiFENesin 100 MG pack, Take 400 mg by mouth Every 4 (Four) Hours As Needed., Disp: , Rfl:   •  metoprolol succinate XL (TOPROL-XL) 25 MG 24 hr tablet, Take 1 tablet by mouth Daily., Disp: 90 tablet, Rfl: 3  •  rosuvastatin (CRESTOR) 10 MG tablet, Take 1 tablet by mouth Daily., Disp: 90 tablet, Rfl: 3  •  valsartan-hydrochlorothiazide (DIOVAN-HCT) 320-12.5 MG per tablet, Take 0.5 tablets by mouth Daily., Disp: 45 tablet, Rfl: 3  •  Vibegron (Gemtesa) 75 MG tablet, , Disp: , Rfl:   •  azithromycin (ZITHROMAX) 250 MG tablet, Take 2 tablets by mouth Daily for 1 day, THEN 1 tablet Daily for 4 days., Disp: 6 tablet, Rfl: 0  •  benzonatate (TESSALON) 200 MG capsule, Take 1 capsule by mouth 3 (Three) Times a Day As Needed for Cough for up to 7 days., Disp: 21 capsule, Rfl: 0  •  predniSONE (DELTASONE) 20 MG tablet, Take 3 tablets by mouth Daily for 1 day, THEN 2 tablets Daily for 2 days, THEN 1 tablet Daily for 2 days., Disp: 9 tablet, Rfl: 0   Past Medical History:   Diagnosis Date   • Allergic 2014   • Anesthesia complication     heart rate dropped    • Arthritis     hips   • Back pain    • Bladder tumor     MYOFIBROBLASTIC TUMOR   • BPH (benign prostatic hyperplasia)    • Coronary artery disease involving native heart without angina pectoris 06/21/2013     Coronary artery disease. Cardiac catheterization on 06/21/2013 showed 80-85% stenosis of the mid LAD, 70-75% lesion of the diagonal 1 branch, and 40% stenosis of the proximal left circumflex artery. Medical management was advised at that point;    • Hyperlipidemia    • Hypertension, essential    • Psoriasis    • Urinary incontinence 2009    uti   • Urinary tract infection 2009      Physical Exam  Vitals reviewed.   Constitutional:       Appearance: Normal appearance. He is well-developed.   HENT:      Right Ear: Ear canal and external ear normal. A middle ear effusion is present.      Left Ear: Ear canal and external ear normal. A middle ear  effusion is present.      Mouth/Throat:      Mouth: Mucous membranes are moist.      Pharynx: Posterior oropharyngeal erythema present. No pharyngeal swelling or oropharyngeal exudate.   Neck:      Thyroid: No thyroid mass, thyromegaly or thyroid tenderness.      Comments: Tender on palpation.   Cardiovascular:      Rate and Rhythm: Normal rate and regular rhythm.      Heart sounds: No murmur heard.    No friction rub. No gallop.   Pulmonary:      Effort: Pulmonary effort is normal.      Breath sounds: Normal breath sounds. No wheezing or rhonchi.   Lymphadenopathy:      Cervical: Cervical adenopathy present.   Skin:     General: Skin is warm and dry.   Neurological:      Mental Status: He is alert and oriented to person, place, and time.      Cranial Nerves: No cranial nerve deficit.   Psychiatric:         Mood and Affect: Mood and affect normal.         Behavior: Behavior normal.         Thought Content: Thought content normal. Thought content does not include homicidal or suicidal ideation.         Judgment: Judgment normal.        Result Review :   {The following data was reviewed by ARUN Guan    No Images in the past 120 days found..    COVIDFLU   SARS Antigen   Date Value Ref Range Status   04/11/2023 Not Detected Not Detected, Presumptive Negative Final     Influenza A Antigen JAMIE   Date Value Ref Range Status   04/11/2023 Not Detected Not Detected Final     Influenza B Antigen JAMIE   Date Value Ref Range Status   04/11/2023 Not Detected Not Detected Final     Internal Control   Date Value Ref Range Status   04/11/2023 Passed Passed Final     Lot Number   Date Value Ref Range Status   04/11/2023 2,332,350  Final     Expiration Date   Date Value Ref Range Status   04/11/2023 2/1/24  Final     RAPIDSTREP   Strep        4/11/2023    10:08   Common Labsle   POC Strep A, Molecular Negative              Assessment and Plan    Diagnoses and all orders for this visit:    1. Upper respiratory infection  with cough and congestion (Primary)  -     azithromycin (ZITHROMAX) 250 MG tablet; Take 2 tablets by mouth Daily for 1 day, THEN 1 tablet Daily for 4 days.  Dispense: 6 tablet; Refill: 0  -     benzonatate (TESSALON) 200 MG capsule; Take 1 capsule by mouth 3 (Three) Times a Day As Needed for Cough for up to 7 days.  Dispense: 21 capsule; Refill: 0  -     predniSONE (DELTASONE) 20 MG tablet; Take 3 tablets by mouth Daily for 1 day, THEN 2 tablets Daily for 2 days, THEN 1 tablet Daily for 2 days.  Dispense: 9 tablet; Refill: 0    2. Sore throat  -     POCT SARS-CoV-2 Antigen JAMIE + Flu  -     POCT rapid strep A    I will start him on Z-Denilson and prednisone Dosepak.  Tessalon Perles as needed for cough.  Advised to call or follow-up if any new or worsening of symptoms or if he does not improve after finishing medication.    Follow Up   Return if symptoms worsen or fail to improve.  Patient was given instructions and counseling regarding his condition or for health maintenance advice. Please see specific information pulled into the AVS if appropriate.     Parts of this note are electronic transcriptions/translations of spoken language to printed text using the Dragon Dictation system.      Nayeli Hagan, APRN  04/11/2023

## 2023-04-17 ENCOUNTER — TELEPHONE (OUTPATIENT)
Dept: FAMILY MEDICINE CLINIC | Facility: CLINIC | Age: 78
End: 2023-04-17
Payer: COMMERCIAL

## 2023-04-17 DIAGNOSIS — J06.9 UPPER RESPIRATORY INFECTION WITH COUGH AND CONGESTION: Primary | ICD-10-CM

## 2023-04-17 RX ORDER — AMOXICILLIN AND CLAVULANATE POTASSIUM 875; 125 MG/1; MG/1
1 TABLET, FILM COATED ORAL 2 TIMES DAILY
Qty: 14 TABLET | Refills: 0 | Status: SHIPPED | OUTPATIENT
Start: 2023-04-17 | End: 2023-04-24

## 2023-04-17 NOTE — TELEPHONE ENCOUNTER
Caller: NANDO GAMINO    Relationship: Emergency Contact    Best call back number: [phone]    What is the best time to reach you: ANYTIME    Who are you requesting to speak with (clinical staff, provider,  specific staff member):CLINICAL    What was the call regarding: THE PATIENT'S SPOUSE WOULD LIKE A CALL BACK TO CONFIRM MEDICATION FOR CONGESTION    Do you require a callback: YES

## 2023-04-17 NOTE — TELEPHONE ENCOUNTER
Not any better. States he is very congested and green phlegm.  Finished ZPACK. Can you do a phone visit this afternoon.

## 2023-04-20 RX ORDER — DEXTROMETHORPHAN HYDROBROMIDE AND PROMETHAZINE HYDROCHLORIDE 15; 6.25 MG/5ML; MG/5ML
5 SYRUP ORAL 4 TIMES DAILY PRN
Qty: 240 ML | Refills: 0 | Status: SHIPPED | OUTPATIENT
Start: 2023-04-20

## 2023-04-20 NOTE — TELEPHONE ENCOUNTER
Yes, I will send him in Promethazine DM.  Have him stop the Tessalon Perles while taking this medication.

## 2023-05-01 ENCOUNTER — LAB (OUTPATIENT)
Dept: LAB | Facility: HOSPITAL | Age: 78
End: 2023-05-01
Payer: COMMERCIAL

## 2023-05-01 DIAGNOSIS — E78.2 MIXED HYPERLIPIDEMIA: Chronic | ICD-10-CM

## 2023-05-01 DIAGNOSIS — I25.10 CORONARY ARTERY DISEASE INVOLVING NATIVE CORONARY ARTERY OF NATIVE HEART WITHOUT ANGINA PECTORIS: Chronic | ICD-10-CM

## 2023-05-01 LAB
ALBUMIN SERPL-MCNC: 4.4 G/DL (ref 3.5–5.2)
ALBUMIN/GLOB SERPL: 1.5 G/DL
ALP SERPL-CCNC: 89 U/L (ref 39–117)
ALT SERPL W P-5'-P-CCNC: 12 U/L (ref 1–41)
ANION GAP SERPL CALCULATED.3IONS-SCNC: 7.2 MMOL/L (ref 5–15)
AST SERPL-CCNC: 11 U/L (ref 1–40)
BILIRUB SERPL-MCNC: 0.4 MG/DL (ref 0–1.2)
BUN SERPL-MCNC: 17 MG/DL (ref 8–23)
BUN/CREAT SERPL: 14.2 (ref 7–25)
CALCIUM SPEC-SCNC: 9.8 MG/DL (ref 8.6–10.5)
CHLORIDE SERPL-SCNC: 107 MMOL/L (ref 98–107)
CHOLEST SERPL-MCNC: 104 MG/DL (ref 0–200)
CO2 SERPL-SCNC: 28.8 MMOL/L (ref 22–29)
CREAT SERPL-MCNC: 1.2 MG/DL (ref 0.76–1.27)
EGFRCR SERPLBLD CKD-EPI 2021: 61.9 ML/MIN/1.73
GLOBULIN UR ELPH-MCNC: 2.9 GM/DL
GLUCOSE SERPL-MCNC: 120 MG/DL (ref 65–99)
HDLC SERPL-MCNC: 32 MG/DL (ref 40–60)
LDLC SERPL CALC-MCNC: 58 MG/DL (ref 0–100)
LDLC/HDLC SERPL: 1.86 {RATIO}
POTASSIUM SERPL-SCNC: 4.2 MMOL/L (ref 3.5–5.2)
PROT SERPL-MCNC: 7.3 G/DL (ref 6–8.5)
SODIUM SERPL-SCNC: 143 MMOL/L (ref 136–145)
TRIGL SERPL-MCNC: 62 MG/DL (ref 0–150)
VLDLC SERPL-MCNC: 14 MG/DL (ref 5–40)

## 2023-05-01 PROCEDURE — 80061 LIPID PANEL: CPT

## 2023-05-01 PROCEDURE — 36415 COLL VENOUS BLD VENIPUNCTURE: CPT

## 2023-05-01 PROCEDURE — 80053 COMPREHEN METABOLIC PANEL: CPT

## 2023-05-15 ENCOUNTER — TELEPHONE (OUTPATIENT)
Dept: CARDIOLOGY | Facility: CLINIC | Age: 78
End: 2023-05-15
Payer: COMMERCIAL

## 2023-05-15 DIAGNOSIS — E78.5 HYPERLIPIDEMIA, UNSPECIFIED HYPERLIPIDEMIA TYPE: Chronic | ICD-10-CM

## 2023-05-15 RX ORDER — ROSUVASTATIN CALCIUM 10 MG/1
10 TABLET, COATED ORAL DAILY
Qty: 90 TABLET | Refills: 3 | Status: SHIPPED | OUTPATIENT
Start: 2023-05-15

## 2023-08-16 DIAGNOSIS — R35.0 URINARY FREQUENCY: Primary | ICD-10-CM

## 2023-08-17 RX ORDER — VIBEGRON 75 MG/1
75 TABLET, FILM COATED ORAL DAILY
Qty: 90 TABLET | Refills: 1 | Status: SHIPPED | OUTPATIENT
Start: 2023-08-17 | End: 2024-02-13

## 2023-08-22 DIAGNOSIS — R35.0 URINARY FREQUENCY: ICD-10-CM

## 2023-08-22 RX ORDER — VIBEGRON 75 MG/1
75 TABLET, FILM COATED ORAL DAILY
Qty: 90 TABLET | Refills: 1 | Status: SHIPPED | OUTPATIENT
Start: 2023-08-22 | End: 2024-02-18

## 2023-08-29 DIAGNOSIS — Z91.09 ENVIRONMENTAL ALLERGIES: Primary | ICD-10-CM

## 2023-08-29 RX ORDER — EZETIMIBE 10 MG/1
10 TABLET ORAL DAILY
Qty: 90 TABLET | Refills: 3 | Status: SHIPPED | OUTPATIENT
Start: 2023-08-29

## 2023-08-30 RX ORDER — DESLORATADINE 5 MG/1
5 TABLET ORAL NIGHTLY
Qty: 90 TABLET | Refills: 3 | Status: SHIPPED | OUTPATIENT
Start: 2023-08-30 | End: 2024-08-24

## 2023-09-01 ENCOUNTER — TELEPHONE (OUTPATIENT)
Dept: UROLOGY | Facility: CLINIC | Age: 78
End: 2023-09-01
Payer: COMMERCIAL

## 2023-09-01 NOTE — TELEPHONE ENCOUNTER
Provider: DR GUITERREZ  Caller: NAT SPENCER  Relationship to Patient: SELF  Pharmacy: [facility]  Phone Number: [phone]  Reason for Call: PT HAS REQUESTED FOR GEMTESA TO BE SENT TO [facility]  When was the patient last seen: 7/6/23    PT ADVISE THIS REQUIRES 2 WEEKS TO BE SENT FROM [facility] AND HAS 1 WEEK LEFT OF SAMPLES.    PLEASE CALL PT TO CONFIRM IF HE CAN  2 MORE WEEKS OF SAMPLES, HE ONLY HAS 1 WEEK LEFT, WHILE WAITING ON MAILORDER.

## 2023-09-07 DIAGNOSIS — R35.0 URINARY FREQUENCY: ICD-10-CM

## 2023-09-07 NOTE — TELEPHONE ENCOUNTER
Pt request pharmacy change to [facility].

## 2023-09-08 RX ORDER — VIBEGRON 75 MG/1
75 TABLET, FILM COATED ORAL DAILY
Qty: 90 TABLET | Refills: 1 | Status: SHIPPED | OUTPATIENT
Start: 2023-09-08 | End: 2024-03-06

## 2023-12-04 DIAGNOSIS — I10 HYPERTENSION, ESSENTIAL: Chronic | ICD-10-CM

## 2023-12-04 RX ORDER — METOPROLOL SUCCINATE 25 MG/1
25 TABLET, EXTENDED RELEASE ORAL DAILY
Qty: 30 TABLET | Refills: 0 | Status: SHIPPED | OUTPATIENT
Start: 2023-12-04

## 2023-12-06 DIAGNOSIS — I25.10 CORONARY ARTERY DISEASE INVOLVING NATIVE CORONARY ARTERY OF NATIVE HEART WITHOUT ANGINA PECTORIS: Chronic | ICD-10-CM

## 2023-12-06 RX ORDER — ASPIRIN 81 MG/1
81 TABLET, COATED ORAL DAILY
Qty: 90 TABLET | Refills: 3 | Status: SHIPPED | OUTPATIENT
Start: 2023-12-06

## 2024-01-12 ENCOUNTER — OFFICE VISIT (OUTPATIENT)
Dept: UROLOGY | Facility: CLINIC | Age: 79
End: 2024-01-12
Payer: COMMERCIAL

## 2024-01-12 VITALS
DIASTOLIC BLOOD PRESSURE: 64 MMHG | BODY MASS INDEX: 32.57 KG/M2 | WEIGHT: 227 LBS | HEART RATE: 73 BPM | SYSTOLIC BLOOD PRESSURE: 148 MMHG

## 2024-01-12 DIAGNOSIS — R35.0 URINARY FREQUENCY: Primary | ICD-10-CM

## 2024-01-12 DIAGNOSIS — N40.1 BPH WITH OBSTRUCTION/LOWER URINARY TRACT SYMPTOMS: ICD-10-CM

## 2024-01-12 DIAGNOSIS — N39.41 URGENCY INCONTINENCE: ICD-10-CM

## 2024-01-12 DIAGNOSIS — N43.40 SPERMATOCELE: ICD-10-CM

## 2024-01-12 DIAGNOSIS — N13.8 BPH WITH OBSTRUCTION/LOWER URINARY TRACT SYMPTOMS: ICD-10-CM

## 2024-01-12 DIAGNOSIS — R39.15 URINARY URGENCY: ICD-10-CM

## 2024-01-12 LAB
BILIRUB BLD-MCNC: NEGATIVE MG/DL
CLARITY, POC: CLEAR
COLOR UR: ABNORMAL
EXPIRATION DATE: ABNORMAL
GLUCOSE UR STRIP-MCNC: NEGATIVE MG/DL
KETONES UR QL: NEGATIVE
LEUKOCYTE EST, POC: NEGATIVE
Lab: ABNORMAL
NITRITE UR-MCNC: NEGATIVE MG/ML
PH UR: 6 [PH] (ref 5–8)
PROT UR STRIP-MCNC: ABNORMAL MG/DL
RBC # UR STRIP: NEGATIVE /UL
SP GR UR: 1.03 (ref 1–1.03)
UROBILINOGEN UR QL: ABNORMAL

## 2024-01-12 RX ORDER — TAMSULOSIN HYDROCHLORIDE 0.4 MG/1
1 CAPSULE ORAL DAILY
Qty: 90 CAPSULE | Refills: 3 | Status: SHIPPED | OUTPATIENT
Start: 2024-01-12 | End: 2025-01-06

## 2024-01-12 NOTE — PROGRESS NOTES
Chief Complaint  Urinary Incontinence (Follow up)    Urinary frequency    Urinary urgency    Subjective no acute distress        Deacon Neil presents to [facility]  History of Present Illness    78-year-old white male has been having urinary frequency and urgency for last several years now.  Patient was started on Gemtesa 75 mg tablets which has really helped him.  Patient has no frequency or urgency at this time.  Patient does have intermittency and straining to urinate.  Patient has no dysuria or gross hematuria.  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    Objective no acute distress  Vital Signs:   /64 (BP Location: Left arm, Patient Position: Sitting, Cuff Size: Adult)   Pulse 73   Wt 103 kg (227 lb)   BMI 32.57 kg/m²     Allergies   Allergen Reactions    Pseudoephedrine Anaphylaxis    Lisinopril Cough      Past medical history:  has a past medical history of Allergic (), Anesthesia complication, Arthritis, Back pain, Bladder tumor, BPH (benign prostatic hyperplasia), Coronary artery disease involving native heart without angina pectoris (2013), Hyperlipidemia, Hyperlipidemia, Hypertension, essential, Psoriasis, Urinary incontinence (), and Urinary tract infection ().   Past surgical history:  has a past surgical history that includes Cataract extraction; Circumcision, non-; Cystoscopy; Tonsillectomy; Vasectomy; Colonoscopy (N/A, 02/15/2022); Total hip arthroplasty (Right, 2022); Bladder surgery (); Cardiac catheterization (2013); and Joint replacement (Right hip total replacement).  Personal history: family history includes Hypertension in his mother.  Social history:  reports that he quit smoking about 10 years ago. His smoking use included cigarettes. He started smoking about 65 years ago. He has a 82.50 pack-year smoking history. He has never used smokeless tobacco. He reports that he does not  currently use alcohol. He reports that he does not use drugs.    Review of Systems   Please see past medical and surgical history and rest of the systems negative  Physical Exam  Constitutional:       General: He is not in acute distress.     Appearance: Normal appearance. He is obese. He is not ill-appearing or toxic-appearing.   HENT:      Head: Normocephalic and atraumatic.      Ears:      Comments: No loss of hearing  Cardiovascular:      Rate and Rhythm: Normal rate and regular rhythm.      Pulses: Normal pulses.      Heart sounds: Normal heart sounds. No murmur heard.  Pulmonary:      Effort: Pulmonary effort is normal. No respiratory distress.      Breath sounds: Normal breath sounds. No rhonchi or rales.   Abdominal:      Palpations: Abdomen is soft. There is no mass.      Tenderness: There is no abdominal tenderness. There is no right CVA tenderness or left CVA tenderness.      Hernia: No hernia is present.   Genitourinary:     Penis: Normal.       Testes: Normal.      Comments: Cystic area above the upper pole left epididymis.  It is almost 3-1/2 cm time 3 and half centimeter and nontender.  Is either a spermatocele or cyst arising from left spermatic cord.  It is nontender and clinically it is benign  Musculoskeletal:         General: Normal range of motion.      Cervical back: Normal range of motion and neck supple. No rigidity or tenderness.   Lymphadenopathy:      Cervical: No cervical adenopathy.   Skin:     General: Skin is warm.      Coloration: Skin is not jaundiced.   Neurological:      General: No focal deficit present.      Mental Status: He is alert and oriented to person, place, and time. Mental status is at baseline.      Motor: No weakness.      Gait: Gait normal.   Psychiatric:         Mood and Affect: Mood normal.         Behavior: Behavior normal.         Thought Content: Thought content normal.         Judgment: Judgment normal.        Result Review :                 Assessment and Plan     Diagnoses and all orders for this visit:    1. Urinary frequency (Primary)  -     POC Urinalysis Dipstick, Automated    2. Urinary urgency    3. Urgency incontinence    4. BPH with obstruction/lower urinary tract symptoms    5. Spermatocele    Start the patient on tamsulosin 0.4 mg PCHS.  Will continue his Gemtesa 75 mg daily and recheck him in 6 months time for LEO     Brief Urine Lab Results  (Last result in the past 365 days)        Color   Clarity   Blood   Leuk Est   Nitrite   Protein   CREAT   Urine HCG        01/12/24 1114 Dark Yellow   Clear   Negative   Negative   Negative   Trace                    Follow Up   No follow-ups on file.  Patient was given instructions and counseling regarding his condition or for health maintenance advice. Please see specific information pulled into the AVS if appropriate.     Crispin Zendejas MD

## 2024-02-13 ENCOUNTER — OFFICE VISIT (OUTPATIENT)
Dept: ORTHOPEDIC SURGERY | Facility: CLINIC | Age: 79
End: 2024-02-13
Payer: COMMERCIAL

## 2024-02-13 VITALS — HEART RATE: 85 BPM | SYSTOLIC BLOOD PRESSURE: 163 MMHG | OXYGEN SATURATION: 92 % | DIASTOLIC BLOOD PRESSURE: 77 MMHG

## 2024-02-13 DIAGNOSIS — M25.552 BILATERAL HIP PAIN: Primary | ICD-10-CM

## 2024-02-13 DIAGNOSIS — Z96.641 HISTORY OF TOTAL HIP ARTHROPLASTY, RIGHT: ICD-10-CM

## 2024-02-13 DIAGNOSIS — M25.551 BILATERAL HIP PAIN: Primary | ICD-10-CM

## 2024-03-26 ENCOUNTER — OFFICE VISIT (OUTPATIENT)
Dept: ORTHOPEDIC SURGERY | Facility: CLINIC | Age: 79
End: 2024-03-26
Payer: COMMERCIAL

## 2024-03-26 VITALS — SYSTOLIC BLOOD PRESSURE: 148 MMHG | HEART RATE: 78 BPM | DIASTOLIC BLOOD PRESSURE: 74 MMHG | OXYGEN SATURATION: 98 %

## 2024-03-26 DIAGNOSIS — M16.12 OSTEOARTHRITIS OF LEFT HIP, UNSPECIFIED OSTEOARTHRITIS TYPE: Primary | ICD-10-CM

## 2024-03-26 DIAGNOSIS — Z96.641 HISTORY OF TOTAL HIP ARTHROPLASTY, RIGHT: ICD-10-CM

## 2024-03-26 PROCEDURE — 99213 OFFICE O/P EST LOW 20 MIN: CPT | Performed by: PHYSICIAN ASSISTANT

## 2024-03-26 NOTE — PROGRESS NOTES
"Chief Complaint  Follow-up of the Right Hip and Follow-up of the Left Hip    Subjective      Deacon Neil presents to [facility] for follow-up of bilateral hips.  He has a history of right total hip arthroplasty performed on 3/18/2022 by Dr. Anderson and left hip pain and osteoarthritis.  He was last seen in office on 2/13/2024 and received referral for return to physical therapy.  He presents independently ambulatory without use of assistive device.  States that his right hip is \"solid, stable, and no pain\".  He reports no further pain to his left hip, however, is stating some continued weakness, although does report this is improving with participation in physical therapy.  Does state that he feels he would benefit from continued participation in PT.  He is currently attending PT at [facility].    Objective   Allergies   Allergen Reactions    Pseudoephedrine Anaphylaxis    Lisinopril Cough       Vital Signs:   /74   Pulse 78   SpO2 98%       Physical Exam    Constitutional: Awake, alert. Well nourished appearance.    Integumentary: Warm, dry, intact. No obvious rashes.    HENT: Atraumatic, normocephalic.   Respiratory: Non labored respirations .   Cardiovascular: Intact peripheral pulses.    Psychiatric: Normal mood and affect. A&O X3    Ortho Exam  Bilateral hips: Skin is warm, dry, and intact.  Well-healed surgical scar noted to the right hip.  There is no tenderness to palpation of bilateral hip greater trochanters.  No pain reported with passive internal or external rotation of the hips.  4/5 strength noted to right hip with flexion, extension, abduction, and adduction.  3/5 strength noted to left hip.  Full flexion and extension of the knees.  Full plantarflexion and dorsiflexion of the ankles.  Sensation and distal neurovascular intact.  Smooth sit to stand transition.  Patient fully weightbearing with nonantalgic gait.    Imaging Results (Most Recent)       None             "        Assessment and Plan   Problem List Items Addressed This Visit          Musculoskeletal and Injuries    OA (osteoarthritis) of hip - Primary    Relevant Orders    Ambulatory Referral to Physical Therapy Evaluate and treat; Stretching, ROM, Strengthening (Completed)     Other Visit Diagnoses       History of total hip arthroplasty, right              Follow Up   Return in about 1 year (around 3/26/2025).    Tobacco Use: Medium Risk (3/26/2024)    Patient History     Smoking Tobacco Use: Former     Smokeless Tobacco Use: Never     Passive Exposure: Not on file     Patient is a former smoker.  Encouraged continued tobacco cessation.  Did not discuss options for smoking cessation.    Patient Instructions   In regards to R hip: Patient is doing very well.  Advised to continue home exercise program to progress strength and ROM. Continue with lifelong antibiotic prophylaxis with dental procedures following total joint replacement. Follow-up in 1 year. Call sooner or return to care, if needed with any changes or concerns. Repeat x-rays.     In regards to left hip: Patient with reported continued progress with participation in physical therapy.  Advised to continue PT to completion to progress strength and ROM.  Continue home exercises.  Follow-up in 6 weeks.  No imaging.  Call changes or concerns.    Patient was given instructions and counseling regarding his condition or for health maintenance advice. Please see specific information pulled into the AVS if appropriate.

## 2024-03-26 NOTE — PATIENT INSTRUCTIONS
In regards to R hip: Patient is doing very well.  Advised to continue home exercise program to progress strength and ROM. Continue with lifelong antibiotic prophylaxis with dental procedures following total joint replacement. Follow-up in 1 year. Call sooner or return to care, if needed with any changes or concerns. Repeat x-rays.     In regards to left hip: Patient with reported continued progress with participation in physical therapy.  Advised to continue PT to completion to progress strength and ROM.  Continue home exercises.  Follow-up in 6 weeks.  No imaging.  Call changes or concerns.

## 2024-04-15 DIAGNOSIS — E78.5 HYPERLIPIDEMIA, UNSPECIFIED HYPERLIPIDEMIA TYPE: Chronic | ICD-10-CM

## 2024-04-15 RX ORDER — ROSUVASTATIN CALCIUM 10 MG/1
10 TABLET, COATED ORAL DAILY
Qty: 90 TABLET | Refills: 3 | Status: SHIPPED | OUTPATIENT
Start: 2024-04-15

## 2024-05-07 ENCOUNTER — OFFICE VISIT (OUTPATIENT)
Dept: ORTHOPEDIC SURGERY | Facility: CLINIC | Age: 79
End: 2024-05-07
Payer: COMMERCIAL

## 2024-05-07 VITALS
DIASTOLIC BLOOD PRESSURE: 68 MMHG | SYSTOLIC BLOOD PRESSURE: 141 MMHG | BODY MASS INDEX: 29.63 KG/M2 | HEART RATE: 81 BPM | WEIGHT: 207 LBS | OXYGEN SATURATION: 90 % | HEIGHT: 70 IN

## 2024-05-07 DIAGNOSIS — Z96.641 HISTORY OF TOTAL HIP ARTHROPLASTY, RIGHT: ICD-10-CM

## 2024-05-07 DIAGNOSIS — M25.551 BILATERAL HIP PAIN: Primary | ICD-10-CM

## 2024-05-07 DIAGNOSIS — M16.12 OSTEOARTHRITIS OF LEFT HIP, UNSPECIFIED OSTEOARTHRITIS TYPE: ICD-10-CM

## 2024-05-07 DIAGNOSIS — M25.552 BILATERAL HIP PAIN: Primary | ICD-10-CM

## 2024-05-07 PROCEDURE — 99213 OFFICE O/P EST LOW 20 MIN: CPT | Performed by: PHYSICIAN ASSISTANT

## 2024-06-20 ENCOUNTER — OFFICE VISIT (OUTPATIENT)
Dept: ORTHOPEDIC SURGERY | Facility: CLINIC | Age: 79
End: 2024-06-20
Payer: COMMERCIAL

## 2024-06-20 VITALS — OXYGEN SATURATION: 93 % | SYSTOLIC BLOOD PRESSURE: 158 MMHG | DIASTOLIC BLOOD PRESSURE: 80 MMHG | HEART RATE: 85 BPM

## 2024-06-20 DIAGNOSIS — Z96.641 HISTORY OF TOTAL HIP ARTHROPLASTY, RIGHT: Primary | ICD-10-CM

## 2024-06-20 DIAGNOSIS — M16.12 OSTEOARTHRITIS OF LEFT HIP, UNSPECIFIED OSTEOARTHRITIS TYPE: ICD-10-CM

## 2024-06-20 RX ORDER — VIBEGRON 75 MG/1
TABLET, FILM COATED ORAL
COMMUNITY
Start: 2024-06-18

## 2024-06-20 NOTE — PATIENT INSTRUCTIONS
Patient has done very well following return to physical therapy.  He completed PT on 5/31/2024 and has continued independent exercise program at [place] 3 days/week.  Encouraged continued participation in this activity.    Continue icing as needed up to 3-4 times daily for no longer than 15 to 20 minutes at a time..     Continue with lifelong antibiotic prophylaxis with dental procedures following total joint replacement.    Follow-up in 12 months. Call sooner, if needed with any changes or concerns. Repeat x-rays.

## 2024-06-20 NOTE — PROGRESS NOTES
Chief Complaint  Follow-up of the Right Hip and Follow-up of the Left Hip    Subjective      Deacon Neil presents to [facility] for follow-up of bilateral hips.  He has history of left hip pain and osteoarthritis and right total hip arthroplasty performed on 3/18/2022 by Dr. Anderson.  He has recently returned to outpatient physical therapy through [facility].  He states he completed this on 5/31/2024 and has continued outpatient exercise regimen under guidance at [employer].  He participates at [employer] 3 days/week for at least 1 hour at a time.  He states his current goals are to maintain range of motion and increase strength.    Objective   Allergies   Allergen Reactions    Pseudoephedrine Anaphylaxis    Lisinopril Cough       Vital Signs:   /80   Pulse 85   SpO2 93%       Physical Exam    Constitutional: Awake, alert. Well nourished appearance.    Integumentary: Warm, dry, intact. No obvious rashes.    HENT: Atraumatic, normocephalic.   Respiratory: Non labored respirations .   Cardiovascular: Intact peripheral pulses.    Psychiatric: Normal mood and affect. A&O X3    Ortho Exam  Bilateral hips: Skin is warm, dry, and intact.  Well-healed surgical scar noted to the right hip.  No tenderness to palpation of greater trochanters.  No pain with passive internal or external rotation of the hips.  Good strength to hip flexors, extensors, abductors, and adductors.  Full flexion and extension of the knees.  Full plantarflexion dorsiflexion of the ankles.  Sensation and distal neurovascular intact.  Smooth sit to stand transition.  Patient fully weightbearing with nonantalgic gait.    Imaging Results (Most Recent)       None                      Assessment and Plan   Problem List Items Addressed This Visit          Musculoskeletal and Injuries    OA (osteoarthritis) of hip     Other Visit Diagnoses       History of total hip arthroplasty, right    -  Primary            Follow  Up   No follow-ups on file.    Tobacco Use: Medium Risk (6/20/2024)    Patient History     Smoking Tobacco Use: Former     Smokeless Tobacco Use: Never     Passive Exposure: Not on file     Patient is a former smoker.  Encouraged continued tobacco cessation.  Did not discuss options for smoking cessation.    Patient Instructions   Patient has done very well following return to physical therapy.  He completed PT on 5/31/2024 and has continued independent exercise program at [employer] 3 days/week.  Encouraged continued participation in this activity.    Continue icing as needed up to 3-4 times daily for no longer than 15 to 20 minutes at a time..     Continue with lifelong antibiotic prophylaxis with dental procedures following total joint replacement.    Follow-up in 12 months. Call sooner, if needed with any changes or concerns. Repeat x-rays.     Patient was given instructions and counseling regarding his condition or for health maintenance advice. Please see specific information pulled into the AVS if appropriate.

## 2024-07-03 ENCOUNTER — LAB (OUTPATIENT)
Dept: LAB | Facility: HOSPITAL | Age: 79
End: 2024-07-03
Payer: COMMERCIAL

## 2024-07-03 DIAGNOSIS — E78.2 MIXED HYPERLIPIDEMIA: Chronic | ICD-10-CM

## 2024-07-03 DIAGNOSIS — I25.10 CORONARY ARTERY DISEASE INVOLVING NATIVE CORONARY ARTERY OF NATIVE HEART WITHOUT ANGINA PECTORIS: Chronic | ICD-10-CM

## 2024-07-03 LAB
ALBUMIN SERPL-MCNC: 4.3 G/DL (ref 3.5–5.2)
ALBUMIN/GLOB SERPL: 1.6 G/DL
ALP SERPL-CCNC: 73 U/L (ref 39–117)
ALT SERPL W P-5'-P-CCNC: 10 U/L (ref 1–41)
ANION GAP SERPL CALCULATED.3IONS-SCNC: 10.9 MMOL/L (ref 5–15)
AST SERPL-CCNC: 14 U/L (ref 1–40)
BILIRUB SERPL-MCNC: 0.4 MG/DL (ref 0–1.2)
BUN SERPL-MCNC: 17 MG/DL (ref 8–23)
BUN/CREAT SERPL: 16.5 (ref 7–25)
CALCIUM SPEC-SCNC: 9.3 MG/DL (ref 8.6–10.5)
CHLORIDE SERPL-SCNC: 103 MMOL/L (ref 98–107)
CHOLEST SERPL-MCNC: 111 MG/DL (ref 0–200)
CO2 SERPL-SCNC: 26.1 MMOL/L (ref 22–29)
CREAT SERPL-MCNC: 1.03 MG/DL (ref 0.76–1.27)
DEPRECATED RDW RBC AUTO: 42.1 FL (ref 37–54)
EGFRCR SERPLBLD CKD-EPI 2021: 73.4 ML/MIN/1.73
ERYTHROCYTE [DISTWIDTH] IN BLOOD BY AUTOMATED COUNT: 13.6 % (ref 12.3–15.4)
GLOBULIN UR ELPH-MCNC: 2.7 GM/DL
GLUCOSE SERPL-MCNC: 114 MG/DL (ref 65–99)
HCT VFR BLD AUTO: 43.2 % (ref 37.5–51)
HDLC SERPL-MCNC: 36 MG/DL (ref 40–60)
HGB BLD-MCNC: 14.1 G/DL (ref 13–17.7)
LDLC SERPL CALC-MCNC: 60 MG/DL (ref 0–100)
LDLC/HDLC SERPL: 1.69 {RATIO}
MCH RBC QN AUTO: 27.9 PG (ref 26.6–33)
MCHC RBC AUTO-ENTMCNC: 32.6 G/DL (ref 31.5–35.7)
MCV RBC AUTO: 85.5 FL (ref 79–97)
PLATELET # BLD AUTO: 269 10*3/MM3 (ref 140–450)
PMV BLD AUTO: 10.5 FL (ref 6–12)
POTASSIUM SERPL-SCNC: 3.9 MMOL/L (ref 3.5–5.2)
PROT SERPL-MCNC: 7 G/DL (ref 6–8.5)
RBC # BLD AUTO: 5.05 10*6/MM3 (ref 4.14–5.8)
SODIUM SERPL-SCNC: 140 MMOL/L (ref 136–145)
TRIGL SERPL-MCNC: 71 MG/DL (ref 0–150)
VLDLC SERPL-MCNC: 15 MG/DL (ref 5–40)
WBC NRBC COR # BLD AUTO: 7.36 10*3/MM3 (ref 3.4–10.8)

## 2024-07-03 PROCEDURE — 80061 LIPID PANEL: CPT

## 2024-07-03 PROCEDURE — 36415 COLL VENOUS BLD VENIPUNCTURE: CPT

## 2024-07-03 PROCEDURE — 80053 COMPREHEN METABOLIC PANEL: CPT

## 2024-07-03 PROCEDURE — 85027 COMPLETE CBC AUTOMATED: CPT

## 2024-07-03 NOTE — TELEPHONE ENCOUNTER
1) REFRACTORY ANEMIA:NORMOCYTIC NORMOCHROMIC ANEMIA /ANEMIA OF CHRONIC DISEASE/CKD/POSSIBLE LOW-GRADE MDS  -possiblity of low grade MDS on bone marrow biopsy  -transfusion dependent.  -please refer to hematology history above  -anemia of chronic disease associated with CKD ,seronegative rheumatoid arthritis now off remicade(due to CHF concerns and off tociluzumab s/p 1 dose due to cytopenias)  -stool occult positive and is now s/p EGD with identification of gastric ulcer (biopsy negative for malignancy),internal hemorroids  -patient currently getting supportive transfusions.  Patient is currently planned on 2nd opinion at [facility] in the interim will continue the patient with supportive transfusion  -will also plan on bone marrow biopsy.  Currently patient is scheduled for bone marrow biopsy on 06/18/2021  --06/18/2021 repeat bone marrow biopsy consistent with normocellular bone marrow approximately 35% cellularity with erythroid predominant trilineage hematopoiesis with mild disc erythropoiesis and mild to moderate dysmegakaryopoiesis erythropoiesis without an increase in blast, less than 1% marrow cells without flow cytometric evidence of plasma cell monoclonality.  CYTOGENETICS PENDING .  Flow cytometry consistent with normal size B lymphocytes without monoclonal T minute population of plasma cells without monoclonal LD normal size T lymphocyte and insufficient amount of blast to warrant further analysis.  Differential include low-grade myelodysplastic syndrome. Although these changes could be due to drug/toxin effect or underlying infection/inflammation, a cytologically relatively subtle myelodysplastic syndrome (without an increase in blasts) is also in the differential diagnosis.   -06/25/2021 1 patient was administered darbepoetin and planned on a close follow-up.  Patient was also transfused 1 unit of PRBC on 07/01/2021.  -on 07/08/2021 patient receive darbepoetin  -on 07/21/2021 patient  Called pt to inform him that his new rx has been sent to the requested pharmacy and he is able to  2 more weeks of samples until his rx arrives.   receive darbepoetin no transfusion offered for hemoglobin of 7.7.  WBC of 3.4 platelet at 141.  -on 2021 patient follows up and was noted to have white cell count of 2.2, hemoglobin of 7.1 MCV of 108.8 and platelet count of 134.  Patient will be a candidate to receive darbepoetin and transfusion for symptomatic anemia  -2021 patient received erythropoietin for hemoglobin of 7.9.  Furthermore CBC indicative of white cell count of 3.4 platelet count of 187.  Patient was noted to have a negative fecal occult testing  -10/15/21 patient received darbepoeitin and on 10/29/2021  -2021 patient to proceed with darbepoetin   -With multiple angiodysplastic lesion identified and noted to be bleeding plan to initiate patient on maximum med support and will plan for ordered intravenous iron and vitamin B12 and folate supplementation.  -on 2021 patient underwent balloon enteroscopy for multiple angiodysplastic lesion of small bowel with active bleeding and patient underwent argon plasma coagulation at multiple sites  For since the endoscopy is patient's hemoglobin has steadily increased and on 2022 and on 2022 patient's hemoglobin was noted to be 11.1.  -2022 patient noted to have hemoglobin of 10.5 and to proceed with Darbepoeitin 500 mcg  -patient follows up on 2022 possible darbepoetin patient's hemoglobin has ranged between 9 and 10 occasionally 11 since 2022.  Patient overall seems to be tolerating darbepoetin with desired effect of hemoglobin between 10 and 11.    -2022 patient presents for erythropoietin.  Hemoglobin was noted to be 10.6 patient has continue taking iron supplement.  Patient was noted to be hypertensive in clinic with systolic blood pressure of 190 and she had a  to attend erythropoietin was held on 2022 with a plan to follow-up in 2 weeks time in order to resume  -on 01/10/2023 patient continued on darbepoetin 500 mcg subcutaneous Q  four-week regimen.  Patient ensures B12 and folic acid and oral iron supplementation at home.    -06/20/2023 patient received darbepoetin and will receive in 2 weeks again with IV iron.    -07/27/2023 patient noted to have hemoglobin of 9.7 with some improvement noted serially while intravenous iron infusion.  Discussed with patient and patient's daughter-in-law present at the clinic visit today regarding anemia of chronic kidney disease as well as possibility of myelodysplastic syndrome identified on the biopsy initially and plan to consider biopsy again patient was to stop responding to erythropoietin stimulating agent in future or any abnormal forms identified in the peripheral circulation with concern of low-grade myelodysplastic syndrome evolving to high-grade MDS.    -08/10/2023 hemoglobin noted to be 9.9 currently improving however to consider bone marrow biopsy if not improving despite.  Will hold off from consideration of bone marrow biopsy currently however always a possibility of evolving MDS  -09/07/2023 patient hemoglobin continues currently with some improvement in her symptoms.  Hold off biopsy repeat currently and will continue with current management.  Patient agreeable with the plan  --10/19/2023 patient has good response to intravenous iron therapy with ongoing darbepoetin support.  Again discussed with the patient possibility of low-grade myelodysplastic syndrome evolving into high-grade however currently patient would like to hold off from more invasive testing in form of bone marrow biopsy.  -03/01/2024 patient proceeded with darbepoetin plan to resume darbepoetin mm week basis after a hiatus of holding darbepoetin for 4 weeks in setting of hyertension.  -05/20/2024 hemoglobin was noted to be 12.1 and darbepoetin was held  -06/05/2024 hemoglobin was noted to be 11.7 and darbepoetin was held  -07/03/2024 patient noted to have hemoglobin of 10.6 and darbepoetin was given to the patient     2)CKD    -usg 10/15/19:Kidneys demonstrate bilateral corticomedullary thinning worse on the right than the left.The right kidney measures 10.9 cm and the left 11.3 cm in length.The renal echogenicity is diffusely increased in keeping with the chronic renal parenchymal disease.   -renal function seems to have improved patient also reports that she is taking her furosemide dose at 20 mg prn based on body weight.   -on labs dated 02/17/2010 the renal function has worsened Ms. estimated GFR of 28   - most recent complete renal system ultrasound from 5/19/20 showed No evidence of hydronephrosis.Bilateral renal cortical thinning, right greater than left. Mild echogenic appearance of the renal cortex, consistent with underlying medical renal disease.   -continue follow-up with Nephrology  -patient has a rise in creatinine on 06/07/2021 with creatinine of 1.82 and estimated GFR of 27  -estimated GFR on 07/08/2021 improved to 39 with serum creatinine of 1.35     3)AGE APPROPRIATE SCREENING/preventive oncology   Colon:colonoscopy March 14, 2014 (5 year follow-up/ Dr. Vargas).colonoscopy at same time as on 3/18/2019.she reports it normal.repeat on 5 year shahida.Moderate left-sided diverticulosis,Small sliding hiatal hernia Mild antral gastritis   Lung:not eligible.quit smoking 51 years ago   Mammogram; patient had mammogram on February 12 2020 which is consistent with BI-RADS category 1 benign and negative for any malignancy.2/12/2020:   Colonoscopy and EGD in 3/18/2019 showed External hemorrhoids. Moderate left-sided diverticulosis. Small sliding hiatal hernia. Mild antral gastritis. Repeat colonoscopy in 5 years. Will be due 3/2024     4) HYPERCALCEMIA:  -hypercalcemia workup with intact parathormone at 52 within normal limits ruling out primary hyperthyroidism   -vitamin D 125 dihydroxy at 35.4 within normal limits   -right timing D 25 hydroxy cholecalciferol 59.3 within normal limit   -PTHrp is 2.8 wnl   -calcium on 1/10/2020 is  10.3.encouraged fluids.   -Off vitamin d supplement and calcium has reverted back to normal range currently   - Concern for multiple myeloma versus MGUS.   -patient has a skeletal survey dated 01/24/2020 and it is negative for any osteolytic or blastic osseous lesion without any active periosteal reaction of acute fracture. Noted degenerative changes .   -after discontinuation of vitamin-D and calcium supplement patient has normal serum calcium levels, normal today 6/19/2020.-   -serum calcium noted to be 9.7 on 3/19/2021  -serum calcium 9.2 on 06/11/2021 with albumin 3.4  -serum calcium 9.2 with albumin of 2.7 on 07/08/2021  -serum calcium 9.3 on 9/9//2021  -discussed hydration and work up as hypercalcemia at this point attrebuted to CKD and calcium supplementation.    -12/06/2022 calcium was noted to be 10.6 discussed with patient oral hydration.    -06/28/2023 calcium noted to be normal at 9.7 with albumin of 3 with corrected calcium of 10.2 upper limit of normal     5)MGUS: IgG kappa   -on 12/30/2019 patient had serum electrophoresis which was positive for a monoclonal spike at 0.3. Of note patient in past did not have a positive monoclonal spike on last electrophoresis is 2014.   -new monoclonal spike detected on recent serum electrophoresis IgG kappa monoclonal protein identified   -elevated free kappa light chains at 6.44 and free lambda light chain at 3.94 with normal kappa lambda ratio of 1.63   -skeletal survey negative on 01/24/2020.   -patient does have anemia however which can be attributed due to chronic kidney disease and therefore will not plan for bone marrow biopsy as yet until and unless monoclonal spike was to change on 3 monthly follow-up   -repeat lab work on 02/17/2020 suggestive of monoclonal protein spike of 0.3 g IgG kappa.   -kappa chains at 7.20 free lambda chains at 4.43 with kappa lambda ratio of 1.63 within normal limits in setting of renal dysfunction with is   - Repeat SPEP/REGINA from  6/12/20 was reviewed and showed stable findings, M protein was 0.2 gm/dL   -with stable myeloma workup currently will hold off from bone marrow biopsy especially when IgG kappa monoclonal protein is less than 1.5 gram/dL and will continue to monitor the patient on 3 monthly basis with myeloma workup   In absence of non IgG M protein,IgG m protein<1.5 gm,normal kappa:lambda ratio and anemia which can be attributed due to CKD ,absence of lytic bone lesion and hypercalcemia will hold of from bone marrow biopsy currently. However patient has developed anemia which is refractory and concern for disease progression and will request for bone biopsy on 10/15/2020   -patient had bone marrow biopsy on 10/23/2020 shows 4-5% polytypic plasma cell  -serum electrophoresis on 04/30/2021 indicative of faint monoclonal protein however elevated free light chain with abnormal kappa lambda ratio of 1.98 in setting of renal dysfunction  -repeat bone marrow biopsy requested on 06/18/2021<1% bone marrow plasma cells, without flow cytometric evidence of plasma cell monoclonality..    -03/22/2023 patient has repeat serum electrophoresis which shows absence of M protein and normal serum electrophoretic pattern and patient noted to have faint IgG kappa monoclonal protein present in the beta region which is not detected by serum protein electrophoresis.     6) NECK SWELLING   Right neck swelling palpated.   USG on 01/24/2020 indicative clear sonography of right posterior neck which did not show any discrete mass of fluid collection in patient has been recommended on CT scan with contrast   - Avoid contrast with renal dysfunction   -patient is planned on nerve ablation c2,c3,c4,c5 by dr Sales.     7) BALANCE ISSUES :IMPROVING  - She notes arthritis pains to her small joints which impedes her ability to stay balanced/steady when walking. She periodically uses a cane to steady herself for ambulating far distances. Patient declined a fall  assessment or PT/OT referral at this time, but will consider this if she seeing worsening of her balance issues.   - Discussed fall precautions with her, advised to stay as physically active as tolerated.   - Driving disability paperwork was completed and signed, she has notable issues with mobility secondary to arthritis and shortness of breath which is impacted by her anemia and is requesting parking disability.   -referral placed for PT/OT to evaluate for walker  3/19/2021 patient is using a quad cane.  --b12 1015 on 4/2021 ruling out SACD  -on 9/9/2021 is using walker and cane on occsion.    -06/20/2023 patient reports she has a fall and a referral was given to physical therapy for gait training and fall prevention      8) RHEUMATOID ARTHRITIS  -patient was on Remicade in past however it was held due to concerns for CHF and was initiated on tocilizumab however tociluzumab was discontinued after 1 dose due to severe neutropenia.  -in absence of positive bone marrow finding likely chronic inflammatory and autoimmune processes contributing to the thrombocytopenia and leukopenia.  -leukopenia improving after holding tociluzumab and ANC on 05/27/2021 was noted to be 0.5 will continue to monitor closely  -on 9/9/2021 patient is on leflunamide and allopruinol   -on allopurinol and leflunamide on 9/2/22  -takes lefunamide and allopurinol currently on 1/10/23     9) TOE INFECTION:  Resolved  -patient on last visit was seen by nurse practitioner and was noted to have a scab on the 2 which was removed and following which patient was started on oral antibiotics.  -on 06/11/2021 her infection has not healed and patient will be started on doxycycline today pertinent soft tissue infection.  -referral to podiatry service for to infection was placed today  -on 06/16/2021 after taking doxycycline no improvement in the infection with sparing infection and patient has been initiated on clindamycin and a stat podiatry consult has  been requested  -s/p on toe amputation.healing well on doxycycline.  -walk with walker at home and patient is off doxycycline.  -on 07/21/2021 patient has completed antibiotics and is no longer on doxycycline and reports good healing at the amputation site  -on 9/9/2021 toe is healing well.  -resolved on 9/2/2022.    -07/13/2023 patient reports she is planning on hammertoe surgery  -scheduled for surgey in end of September 2023 for hammer toes  -Scheduled for surgery on 10/30/23     10) WEIGHT LOSS  -08/10/2020 patient has lost about 13 lb since January of 2023.    -also reports being more active participating in water exercises currently.    -monitor.    -08/10/2023 weight was 214 lb   -08/24/2023 patient has weight of 210 lb.  Does report feeling depressed currently has an upcoming appointment with [facility] services currently patient is on Zoloft  -3/1/24 stable around 205 lbs        11) PULMONARY NODULES  -ct 2/23/24: The vast majority of the patient's pulmonary nodules are consistent with  benign granulomas and are stable since 2021. However there are at least 2  new small subcentimeter nodules. Consider follow-up CT of the chest in 6 to  12 months if clinically indicated           PLAN:  -proceede with darbepoeitin today   -follow up in 4 weeks with cbc /cmp   with md for  possible darbepoeitin with md

## 2024-07-08 ENCOUNTER — OFFICE VISIT (OUTPATIENT)
Dept: CARDIOLOGY | Facility: CLINIC | Age: 79
End: 2024-07-08
Payer: COMMERCIAL

## 2024-07-08 VITALS
HEIGHT: 70 IN | WEIGHT: 218 LBS | DIASTOLIC BLOOD PRESSURE: 66 MMHG | BODY MASS INDEX: 31.21 KG/M2 | HEART RATE: 77 BPM | SYSTOLIC BLOOD PRESSURE: 148 MMHG

## 2024-07-08 DIAGNOSIS — I10 HYPERTENSION, ESSENTIAL: Chronic | ICD-10-CM

## 2024-07-08 DIAGNOSIS — Z00.00 ANNUAL PHYSICAL EXAM: ICD-10-CM

## 2024-07-08 DIAGNOSIS — I25.10 CORONARY ARTERY DISEASE INVOLVING NATIVE CORONARY ARTERY OF NATIVE HEART WITHOUT ANGINA PECTORIS: Primary | Chronic | ICD-10-CM

## 2024-07-08 DIAGNOSIS — E78.2 MIXED HYPERLIPIDEMIA: Chronic | ICD-10-CM

## 2024-07-08 PROCEDURE — 99213 OFFICE O/P EST LOW 20 MIN: CPT | Performed by: INTERNAL MEDICINE

## 2024-07-08 NOTE — ASSESSMENT & PLAN NOTE
Blood pressure reasonably well-controlled.  Continue metoprolol and valsartan/HCTZ at the current dose.  Recent labs showed normal electrolytes and renal functions.

## 2024-07-08 NOTE — ASSESSMENT & PLAN NOTE
He is currently stable with no angina.  Continue aspirin, statin beta-blocker at the current dose.

## 2024-07-08 NOTE — PROGRESS NOTES
CARDIOLOGY FOLLOW-UP PROGRESS NOTE        Chief Complaint  Coronary Artery Disease, Hypertension, Hyperlipidemia, and Follow-up (Pt would like to know if you want him to continue all current RX)    Subjective            Deacon Neil presents to [facility] CARDIOLOGY  History of Present Illness      Mr Neil is here for annual follow-up visit.  He denies any new complaints.  He is very active at baseline.  He is exercising in the gym 3 times a week.  After his hip replacement in , he is more active.  No recent episodes of chest pain, shortness of breath, palpitations or dizziness.  He is taking all the medications as prescribed.      Past History:    1) Coronary artery disease. Cardiac catheterization on 2013 showed 80-85% stenosis of the mid LAD, 70-75% lesion of the diagonal 1 branch, and 40% stenosis of the proximal left circumflex artery. Medical management was advised at that point; 2) Hyperlipidemia; 3) Hypertension; 4) Psoriasis     Medical History:  Past Medical History:   Diagnosis Date    2014    Anesthesia complication     heart rate dropped     Arthritis     hips    Back pain     Bladder tumor     MYOFIBROBLASTIC TUMOR    BPH (benign prostatic hyperplasia)     Coronary artery disease involving native heart without angina pectoris 2013     Coronary artery disease. Cardiac catheterization on 2013 showed 80-85% stenosis of the mid LAD, 70-75% lesion of the diagonal 1 branch, and 40% stenosis of the proximal left circumflex artery. Medical management was advised at that point;     Hyperlipidemia     Hyperlipidemia     Hypertension, essential     Psoriasis     Urinary incontinence     uti    Urinary tract infection        Surgical History: has a past surgical history that includes Cataract extraction; Circumcision, non-; Cystoscopy; Tonsillectomy; Vasectomy; Colonoscopy (N/A, 02/15/2022); Total hip arthroplasty (Right, 2022);  Bladder surgery (2009); Cardiac catheterization (6/2013); and Joint replacement (Right hip total replacement).     Family History: family history includes Hypertension in his mother.     Social History: reports that he quit smoking about 11 years ago. His smoking use included cigarettes. He started smoking about 66 years ago. He has a 82.5 pack-year smoking history. He has never used smokeless tobacco. He reports that he does not currently use alcohol. He reports that he does not use drugs.    Allergies: Pseudoephedrine and Lisinopril    Current Outpatient Medications on File Prior to Visit   Medication Sig    Aspirin Low Dose 81 MG EC tablet TAKE 1 TABLET BY MOUTH EVERY DAY    coenzyme Q10 100 MG capsule Take 2 capsules by mouth Every Morning. INST PER ANESTHESIA PROTOCOL    Cosentyx Sensoready, 300 MG, 150 MG/ML solution auto-injector 2 mL Every 30 (Thirty) Days. EVERY 4TH WEEK/TAKES FOR PLAQUE PSORIASIS    desloratadine (CLARINEX) 5 MG tablet Take 1 tablet by mouth Every Night for 360 days.    desonide (DESOWEN) 0.05 % cream APPLY TOPICALLY TO GROIN TWICE DAILY AS NEEDED FOR PSORIASIS    ezetimibe (ZETIA) 10 MG tablet TAKE 1 TABLET BY MOUTH DAILY    Gemtesa 75 MG tablet     metoprolol succinate XL (TOPROL-XL) 25 MG 24 hr tablet TAKE 1 TABLET BY MOUTH DAILY    rosuvastatin (CRESTOR) 10 MG tablet TAKE 1 TABLET BY MOUTH DAILY    tamsulosin (FLOMAX) 0.4 MG capsule 24 hr capsule Take 1 capsule by mouth Daily for 360 days.    valsartan-hydrochlorothiazide (DIOVAN-HCT) 320-12.5 MG per tablet Take 0.5 tablets by mouth Daily.    acetaminophen (TYLENOL) 500 MG tablet Take 1 tablet by mouth Every 6 (Six) Hours As Needed for Mild Pain.    econazole nitrate (SPECTAZOLE) 1 % cream econazole 1 % topical cream apply to the affected and surrounding areas of skin by topical route once daily   Active (Patient not taking: Reported on 7/8/2024)    guaiFENesin 100 MG pack Take 400 mg by mouth Every 4 (Four) Hours As Needed. (Patient  "not taking: Reported on 7/8/2024)     No current facility-administered medications on file prior to visit.          Review of Systems   Respiratory:  Negative for cough, shortness of breath and wheezing.    Cardiovascular:  Negative for chest pain, palpitations and leg swelling.   Gastrointestinal:  Negative for nausea and vomiting.   Neurological:  Negative for dizziness and syncope.        Objective     /66 (BP Location: Right arm)   Pulse 77   Ht 177.8 cm (70\")   Wt 98.9 kg (218 lb)   BMI 31.28 kg/m²       Physical Exam    General : Alert, awake, no acute distress  Neck : Supple, no carotid bruit, no jugular venous distention  CVS : Regular rate and rhythm, no murmur, rubs or gallops  Lungs: Clear to auscultation bilaterally, no crackles or rhonchi  Abdomen: Soft, nontender, bowel sounds heard in all 4 quadrants  Extremities: Warm, well-perfused, no pedal edema    Result Review :     The following data was reviewed by: Randell Albert MD on 07/08/2024:    CMP          7/3/2024    07:20   CMP   Glucose 114    BUN 17    Creatinine 1.03    EGFR 73.4    Sodium 140    Potassium 3.9    Chloride 103    Calcium 9.3    Total Protein 7.0    Albumin 4.3    Globulin 2.7    Total Bilirubin 0.4    Alkaline Phosphatase 73    AST (SGOT) 14    ALT (SGPT) 10    Albumin/Globulin Ratio 1.6    BUN/Creatinine Ratio 16.5    Anion Gap 10.9      CBC          7/3/2024    07:20   CBC   WBC 7.36    RBC 5.05    Hemoglobin 14.1    Hematocrit 43.2    MCV 85.5    MCH 27.9    MCHC 32.6    RDW 13.6    Platelets 269        Lipid Panel          7/3/2024    07:20   Lipid Panel   Total Cholesterol 111    Triglycerides 71    HDL Cholesterol 36    VLDL Cholesterol 15    LDL Cholesterol  60    LDL/HDL Ratio 1.69                    Assessment and Plan        Diagnoses and all orders for this visit:    1. Coronary artery disease involving native coronary artery of native heart without angina pectoris (Primary)  Assessment & Plan:  He is " currently stable with no angina.  Continue aspirin, statin beta-blocker at the current dose.    Orders:  -     CBC (No Diff); Future    2. Hypertension, essential  Assessment & Plan:  Blood pressure reasonably well-controlled.  Continue metoprolol and valsartan/HCTZ at the current dose.  Recent labs showed normal electrolytes and renal functions.      3. Mixed hyperlipidemia  Assessment & Plan:  LDL is 60, at goal.  Continue rosuvastatin 10 mg daily along with Zetia 10 mg daily.    Orders:  -     Comprehensive Metabolic Panel; Future  -     Lipid Panel; Future    4. Annual physical exam  -     Hemoglobin A1c; Future              Follow Up     Return in about 1 year (around 7/8/2025) for Next scheduled follow up.    Patient was given instructions and counseling regarding his condition or for health maintenance advice. Please see specific information pulled into the AVS if appropriate.

## 2024-07-17 ENCOUNTER — OFFICE VISIT (OUTPATIENT)
Dept: UROLOGY | Facility: CLINIC | Age: 79
End: 2024-07-17
Payer: COMMERCIAL

## 2024-07-17 VITALS
HEIGHT: 70 IN | DIASTOLIC BLOOD PRESSURE: 55 MMHG | BODY MASS INDEX: 31.41 KG/M2 | WEIGHT: 219.4 LBS | HEART RATE: 75 BPM | SYSTOLIC BLOOD PRESSURE: 140 MMHG

## 2024-07-17 DIAGNOSIS — Z91.09 ENVIRONMENTAL ALLERGIES: ICD-10-CM

## 2024-07-17 DIAGNOSIS — R35.0 URINARY FREQUENCY: Primary | ICD-10-CM

## 2024-07-17 DIAGNOSIS — N13.8 BPH WITH OBSTRUCTION/LOWER URINARY TRACT SYMPTOMS: ICD-10-CM

## 2024-07-17 DIAGNOSIS — N40.1 BPH WITH OBSTRUCTION/LOWER URINARY TRACT SYMPTOMS: ICD-10-CM

## 2024-07-17 DIAGNOSIS — N32.81 OVERACTIVE BLADDER: ICD-10-CM

## 2024-07-17 LAB
BILIRUB BLD-MCNC: NEGATIVE MG/DL
CLARITY, POC: CLEAR
COLOR UR: YELLOW
EXPIRATION DATE: NORMAL
GLUCOSE UR STRIP-MCNC: NEGATIVE MG/DL
KETONES UR QL: NEGATIVE
LEUKOCYTE EST, POC: NEGATIVE
Lab: NORMAL
NITRITE UR-MCNC: NEGATIVE MG/ML
PH UR: 5.5 [PH] (ref 5–8)
PROT UR STRIP-MCNC: NEGATIVE MG/DL
RBC # UR STRIP: NEGATIVE /UL
SP GR UR: 1.02 (ref 1–1.03)
UROBILINOGEN UR QL: NORMAL

## 2024-07-17 RX ORDER — VIBEGRON 75 MG/1
75 TABLET, FILM COATED ORAL DAILY
Qty: 90 TABLET | Refills: 1 | Status: SHIPPED | OUTPATIENT
Start: 2024-07-17 | End: 2025-01-13

## 2024-07-17 RX ORDER — TAMSULOSIN HYDROCHLORIDE 0.4 MG/1
1 CAPSULE ORAL DAILY
Qty: 90 CAPSULE | Refills: 3 | Status: SHIPPED | OUTPATIENT
Start: 2024-07-17 | End: 2025-07-12

## 2024-07-17 RX ORDER — DESLORATADINE 5 MG/1
5 TABLET ORAL NIGHTLY
Qty: 90 TABLET | Refills: 3 | Status: SHIPPED | OUTPATIENT
Start: 2024-07-17 | End: 2025-07-12

## 2024-07-17 NOTE — PROGRESS NOTES
"Chief Complaint  Urinary Frequency (6 month follow up, doing well on gemtesa. Needs refill on tamsulosin and desloratadine last PSA 23 )    BPH with obstructive uropathy    Subjective no acute distress        Deacon Neil presents to [facility] UROLOGY  History of Present Illness    79-year-old white male is here for evaluation of his prostate and continues to have tamsulosin 0.4 mg PCHS which helps his urination.    Patient also has overactive bladder for which she takes Gemtesa 75 mg daily.  Patient has no dysuria or gross hematuria.  No family history of prostate cancer.  Patient does not want to have PSA done    Objective no acute distress  Vital Signs:   /55 (BP Location: Left arm, Patient Position: Sitting, Cuff Size: Adult)   Pulse 75   Ht 177.8 cm (70\")   Wt 99.5 kg (219 lb 6.4 oz)   BMI 31.48 kg/m²     Allergies   Allergen Reactions    Pseudoephedrine Anaphylaxis    Lisinopril Cough      Past medical history:  has a past medical history of Allergic (), Anesthesia complication, Arthritis, Back pain, Bladder tumor, BPH (benign prostatic hyperplasia), Coronary artery disease involving native heart without angina pectoris (2013), Hyperlipidemia, Hyperlipidemia, Hypertension, essential, Psoriasis, Urinary incontinence (), and Urinary tract infection ().   Past surgical history:  has a past surgical history that includes Cataract extraction; Circumcision, non-; Cystoscopy; Tonsillectomy; Vasectomy; Colonoscopy (N/A, 02/15/2022); Total hip arthroplasty (Right, 2022); Bladder surgery (); Cardiac catheterization (2013); and Joint replacement (Right hip total replacement).  Personal history: family history includes Hypertension in his mother.  Social history:  reports that he quit smoking about 11 years ago. His smoking use included cigarettes. He started smoking about 66 years ago. He has a 82.5 pack-year smoking history. He has been exposed " to tobacco smoke. He has never used smokeless tobacco. He reports that he does not currently use alcohol. He reports that he does not use drugs.    Review of Systems    Please see past medical and surgical history    Physical Exam  Constitutional:       General: He is not in acute distress.     Appearance: Normal appearance. He is obese. He is not ill-appearing or toxic-appearing.   HENT:      Head: Normocephalic and atraumatic.      Ears:      Comments: No loss of hearing  Pulmonary:      Effort: Pulmonary effort is normal. No respiratory distress.   Abdominal:      Palpations: Abdomen is soft. There is no mass.      Tenderness: There is no abdominal tenderness. There is no right CVA tenderness or left CVA tenderness.   Genitourinary:     Penis: Normal.       Testes: Normal.      Comments: No LEO at his request  Musculoskeletal:         General: No swelling. Normal range of motion.   Skin:     General: Skin is warm.      Coloration: Skin is not jaundiced.   Neurological:      General: No focal deficit present.      Mental Status: He is alert and oriented to person, place, and time.      Motor: No weakness.      Gait: Gait normal.   Psychiatric:         Mood and Affect: Mood normal.         Behavior: Behavior normal.         Thought Content: Thought content normal.         Judgment: Judgment normal.        Result Review :                 Assessment and Plan    Diagnoses and all orders for this visit:    1. Urinary frequency (Primary)  -     POC Urinalysis Dipstick, Automated  -     Gemtesa 75 MG tablet; Take 1 tablet by mouth Daily for 180 days.  Dispense: 90 tablet; Refill: 1    2. BPH with obstruction/lower urinary tract symptoms  -     tamsulosin (FLOMAX) 0.4 MG capsule 24 hr capsule; Take 1 capsule by mouth Daily for 360 days.  Dispense: 90 capsule; Refill: 3  -     Gemtesa 75 MG tablet; Take 1 tablet by mouth Daily for 180 days.  Dispense: 90 tablet; Refill: 1    3. Overactive bladder  -     Gemtesa 75 MG  tablet; Take 1 tablet by mouth Daily for 180 days.  Dispense: 90 tablet; Refill: 1    4. Environmental allergies  -     desloratadine (CLARINEX) 5 MG tablet; Take 1 tablet by mouth Every Night for 360 days.  Dispense: 90 tablet; Refill: 3    Will continue tamsulosin and Gemtesa.  I am going to refer him to Kristen Perdomo for follow-up of overactive bladder.     Brief Urine Lab Results  (Last result in the past 365 days)        Color   Clarity   Blood   Leuk Est   Nitrite   Protein   CREAT   Urine HCG        07/17/24 1149 Yellow   Clear   Negative   Negative   Negative   Negative                    Follow Up   No follow-ups on file.  Patient was given instructions and counseling regarding his condition or for health maintenance advice. Please see specific information pulled into the AVS if appropriate.     Crispin Zendejas MD

## 2024-07-18 DIAGNOSIS — I10 HYPERTENSION, ESSENTIAL: Chronic | ICD-10-CM

## 2024-07-18 RX ORDER — VALSARTAN AND HYDROCHLOROTHIAZIDE 320; 12.5 MG/1; MG/1
0.5 TABLET, FILM COATED ORAL DAILY
Qty: 45 TABLET | Refills: 3 | Status: SHIPPED | OUTPATIENT
Start: 2024-07-18

## 2024-07-29 RX ORDER — EZETIMIBE 10 MG/1
10 TABLET ORAL DAILY
Qty: 90 TABLET | Refills: 3 | Status: SHIPPED | OUTPATIENT
Start: 2024-07-29

## 2024-08-14 DIAGNOSIS — I10 HYPERTENSION, ESSENTIAL: Chronic | ICD-10-CM

## 2024-08-14 RX ORDER — VALSARTAN AND HYDROCHLOROTHIAZIDE 320; 12.5 MG/1; MG/1
0.5 TABLET, FILM COATED ORAL DAILY
Qty: 45 TABLET | Refills: 3 | Status: SHIPPED | OUTPATIENT
Start: 2024-08-14

## 2024-08-26 DIAGNOSIS — I10 HYPERTENSION, ESSENTIAL: Chronic | ICD-10-CM

## 2024-08-26 RX ORDER — METOPROLOL SUCCINATE 25 MG/1
25 TABLET, EXTENDED RELEASE ORAL DAILY
Qty: 90 TABLET | Refills: 3 | Status: SHIPPED | OUTPATIENT
Start: 2024-08-26

## 2024-09-03 ENCOUNTER — OFFICE VISIT (OUTPATIENT)
Dept: UROLOGY | Facility: CLINIC | Age: 79
End: 2024-09-03
Payer: COMMERCIAL

## 2024-09-03 VITALS
DIASTOLIC BLOOD PRESSURE: 95 MMHG | SYSTOLIC BLOOD PRESSURE: 144 MMHG | WEIGHT: 220.8 LBS | RESPIRATION RATE: 14 BRPM | HEIGHT: 70 IN | BODY MASS INDEX: 31.61 KG/M2 | HEART RATE: 73 BPM

## 2024-09-03 DIAGNOSIS — N13.8 BPH WITH OBSTRUCTION/LOWER URINARY TRACT SYMPTOMS: ICD-10-CM

## 2024-09-03 DIAGNOSIS — N32.81 OVERACTIVE BLADDER: ICD-10-CM

## 2024-09-03 DIAGNOSIS — N40.1 BPH WITH OBSTRUCTION/LOWER URINARY TRACT SYMPTOMS: ICD-10-CM

## 2024-09-03 DIAGNOSIS — R39.15 URINARY URGENCY: ICD-10-CM

## 2024-09-03 DIAGNOSIS — R35.0 URINARY FREQUENCY: Primary | ICD-10-CM

## 2024-09-03 LAB
BILIRUB BLD-MCNC: NEGATIVE MG/DL
CLARITY, POC: CLEAR
COLOR UR: YELLOW
EXPIRATION DATE: NORMAL
GLUCOSE UR STRIP-MCNC: NEGATIVE MG/DL
KETONES UR QL: NEGATIVE
LEUKOCYTE EST, POC: NEGATIVE
Lab: NORMAL
NITRITE UR-MCNC: NEGATIVE MG/ML
PH UR: 6 [PH] (ref 5–8)
PROT UR STRIP-MCNC: NEGATIVE MG/DL
RBC # UR STRIP: NEGATIVE /UL
SP GR UR: 1.02 (ref 1–1.03)
URINE VOLUME: 248
UROBILINOGEN UR QL: NORMAL

## 2024-09-03 RX ORDER — AMOXICILLIN 500 MG/1
CAPSULE ORAL
COMMUNITY
Start: 2024-08-13

## 2024-09-03 NOTE — PROGRESS NOTES
Chief Complaint: overactive bladder and Benign Prostatic Hypertrophy (Pt here as a new pt.  Pt is taking Gemtesa and Tamsulosin.  Pt states medications are controlling his symptoms.)    Subjective         History of Present Illness  Deacon Neil is a 79 y.o. male presents to [facility] UROLOG[facility] to be seen for OAB and BPH.    Patient previously established with Dr. Zendejas.    Patient is on tamsulosin 0.4 mg daily and Gemtesa 75 mg daily however was referred here to us for OAB symptoms refractory to current modalities.    He has been on the gemtesa for about 6 months.    Patient's PVR today is 248. No documented baseline.     Nocturia x 0    Stream good.     Denies postvoid dribble.     Urgency/ frequency well controlled on gemtesa.    Denies family hx of  malignancies. Personal hx of fibroepithelial bladder tumor in 2009.        Objective     Past Medical History:   Diagnosis Date    Allergic 2014    Anesthesia complication     heart rate dropped     Arthritis     hips    Back pain     Bladder tumor     MYOFIBROBLASTIC TUMOR    BPH (benign prostatic hyperplasia)     Coronary artery disease involving native heart without angina pectoris 06/21/2013     Coronary artery disease. Cardiac catheterization on 06/21/2013 showed 80-85% stenosis of the mid LAD, 70-75% lesion of the diagonal 1 branch, and 40% stenosis of the proximal left circumflex artery. Medical management was advised at that point;     Hyperlipidemia     Hyperlipidemia     Hypertension, essential     Psoriasis     Urinary incontinence 2009    uti    Urinary tract infection 2009       Past Surgical History:   Procedure Laterality Date    BLADDER SURGERY  2009    benign tumor    CARDIAC CATHETERIZATION  6/2013    CATARACT EXTRACTION      CIRCUMCISION      COLONOSCOPY N/A 02/15/2022    Procedure: COLONOSCOPY;  Surgeon: Roni Pedraza MD;  Location: [facility] ENDOSCOPY;  Service: Gastroenterology;  Laterality: N/A;   HEMORRHOIDS  DIVERTICULOSIS    CYSTOSCOPY      JOINT REPLACEMENT  Right hip total replacement    2022    TONSILLECTOMY      TOTAL HIP ARTHROPLASTY Right 03/18/2022    Procedure: TOTAL HIP ARTHROPLASTY ANTERIOR right;  Surgeon: Bhavesh Anderson MD;  Location: [facility] MAIN OR;  Service: Orthopedics;  Laterality: Right;    VASECTOMY           Current Outpatient Medications:     acetaminophen (TYLENOL) 500 MG tablet, Take 1 tablet by mouth Every 6 (Six) Hours As Needed for Mild Pain., Disp: , Rfl:     amoxicillin (AMOXIL) 500 MG capsule, TK FOUR CS PO 1 HOUR B DAPP, Disp: , Rfl:     Aspirin Low Dose 81 MG EC tablet, TAKE 1 TABLET BY MOUTH EVERY DAY, Disp: 90 tablet, Rfl: 3    coenzyme Q10 100 MG capsule, Take 2 capsules by mouth Every Morning. INST PER ANESTHESIA PROTOCOL, Disp: , Rfl:     Cosentyx Sensoready, 300 MG, 150 MG/ML solution auto-injector, 2 mL Every 30 (Thirty) Days. EVERY 4TH WEEK/TAKES FOR PLAQUE PSORIASIS, Disp: , Rfl:     desloratadine (CLARINEX) 5 MG tablet, Take 1 tablet by mouth Every Night for 360 days., Disp: 90 tablet, Rfl: 3    desonide (DESOWEN) 0.05 % cream, APPLY TOPICALLY TO GROIN TWICE DAILY AS NEEDED FOR PSORIASIS, Disp: , Rfl:     econazole nitrate (SPECTAZOLE) 1 % cream, , Disp: , Rfl:     ezetimibe (ZETIA) 10 MG tablet, TAKE 1 TABLET BY MOUTH DAILY, Disp: 90 tablet, Rfl: 3    Gemtesa 75 MG tablet, Take 1 tablet by mouth Daily for 180 days., Disp: 90 tablet, Rfl: 1    guaiFENesin 100 MG pack, Take 400 mg by mouth Every 4 (Four) Hours As Needed., Disp: , Rfl:     metoprolol succinate XL (TOPROL-XL) 25 MG 24 hr tablet, TAKE 1 TABLET BY MOUTH DAILY, Disp: 90 tablet, Rfl: 3    rosuvastatin (CRESTOR) 10 MG tablet, TAKE 1 TABLET BY MOUTH DAILY, Disp: 90 tablet, Rfl: 3    tamsulosin (FLOMAX) 0.4 MG capsule 24 hr capsule, Take 1 capsule by mouth Daily for 360 days., Disp: 90 capsule, Rfl: 3    valsartan-hydrochlorothiazide (DIOVAN-HCT) 320-12.5 MG per tablet, TAKE ONE-HALF TABLET BY MOUTH DAILY,  "Disp: 45 tablet, Rfl: 3    Allergies   Allergen Reactions    Pseudoephedrine Anaphylaxis    Lisinopril Cough        Family History   Problem Relation Age of Onset    Hypertension Mother          in sleep, age 90    Colon cancer Neg Hx     Malig Hyperthermia Neg Hx        Social History     Socioeconomic History    Marital status:    Tobacco Use    Smoking status: Former     Current packs/day: 0.00     Average packs/day: 1.5 packs/day for 55.0 years (82.5 ttl pk-yrs)     Types: Cigarettes     Start date: 1958     Quit date: 2013     Years since quittin.3     Passive exposure: Past    Smokeless tobacco: Never    Tobacco comments:     enjoyment, then addiction, lastly distaste   Vaping Use    Vaping status: Never Used   Substance and Sexual Activity    Alcohol use: Not Currently    Drug use: Never    Sexual activity: Not Currently       Vital Signs:   /95 (BP Location: Left arm, Patient Position: Sitting)   Pulse 73   Resp 14   Ht 177.8 cm (70\")   Wt 100 kg (220 lb 12.8 oz)   BMI 31.68 kg/m²      Physical Exam     Result Review :   The following data was reviewed by: ARUN Chester on 2024:  Results for orders placed or performed in visit on 24   Bladder Scan   Result Value Ref Range    Urine Volume 248    POC Urinalysis Dipstick, Automated    Specimen: Urine   Result Value Ref Range    Color Yellow Yellow, Straw, Dark Yellow, Randa    Clarity, UA Clear Clear    Specific Gravity  1.020 1.005 - 1.030    pH, Urine 6.0 5.0 - 8.0    Leukocytes Negative Negative    Nitrite, UA Negative Negative    Protein, POC Negative Negative mg/dL    Glucose, UA Negative Negative mg/dL    Ketones, UA Negative Negative    Urobilinogen, UA Normal Normal, 0.2 E.U./dL    Bilirubin Negative Negative    Blood, UA Negative Negative    Lot Number [id number]     Expiration Date        Bladder Scan interpretation 2024    Estimation of residual urine via BVI 3000 Verathon Bladder " Scan  MA/nurse performing: dylan kamara   Residual Urine: 248 ml  Indication: Urinary frequency    BPH with obstruction/lower urinary tract symptoms    Overactive bladder    Urinary urgency   Position: Supine  Examination: Incremental scanning of the suprapubic area using 2.0 MHz transducer using copious amounts of acoustic gel.   Findings: An anechoic area was demonstrated which represented the bladder, with measurement of residual urine as noted. I inspected this myself. In that the residual urine was stable or insignificant, refer to plan for treatment and plan necessary at this time.            Procedures        Assessment and Plan    Diagnoses and all orders for this visit:    1. Urinary frequency (Primary)  -     POC Urinalysis Dipstick, Automated  -     Bladder Scan    2. BPH with obstruction/lower urinary tract symptoms  -     POC Urinalysis Dipstick, Automated  -     Bladder Scan    3. Overactive bladder  -     POC Urinalysis Dipstick, Automated  -     Bladder Scan    4. Urinary urgency  -     POC Urinalysis Dipstick, Automated  -     Bladder Scan      Patient's lower urinary tract symptoms and OAB symptoms are well-controlled at this time however given increased PVR I am little concerned that this may be related to the Gemtesa.    I do recommend repeating a PVR in about 3 months and we will follow-up to discuss further evaluation and treatment of his OAB symptoms should his PVR continue to be elevated.    May consider taking him off of Gemtesa and increasing his tamsulosin dosage or adding finasteride to his plan of care.          I spent 15 minutes caring for Deacon on this date of service. This time includes time spent by me in the following activities:reviewing tests, obtaining and/or reviewing a separately obtained history, performing a medically appropriate examination and/or evaluation , counseling and educating the patient/family/caregiver, ordering medications, tests, or procedures, and documenting  information in the medical record  Follow Up   Return in about 3 months (around 12/3/2024) for f/u oab with PVR .  Patient was given instructions and counseling regarding his condition or for health maintenance advice. Please see specific information pulled into the AVS if appropriate.         This document has been electronically signed by ARUN Chester  September 3, 2024 11:39 EDT

## 2024-10-03 ENCOUNTER — OFFICE VISIT (OUTPATIENT)
Dept: ORTHOPEDIC SURGERY | Facility: CLINIC | Age: 79
End: 2024-10-03
Payer: COMMERCIAL

## 2024-10-03 VITALS — OXYGEN SATURATION: 92 % | HEIGHT: 70 IN | WEIGHT: 220 LBS | HEART RATE: 104 BPM | BODY MASS INDEX: 31.5 KG/M2

## 2024-10-03 DIAGNOSIS — M25.551 BILATERAL HIP PAIN: Primary | ICD-10-CM

## 2024-10-03 DIAGNOSIS — M25.552 BILATERAL HIP PAIN: Primary | ICD-10-CM

## 2024-10-03 NOTE — PROGRESS NOTES
"Chief Complaint  Pain and Follow-up of the Right Hip and Follow-up and Pain of the Left Hip     Subjective      Deacon Neil presents to [facility] for follow up of bilateral hips.  He has history of left hip pain and osteoarthritis and right total hip arthroplasty performed on 3/18/2022. He had a fall 2 weeks ago.  He landed on his bottom and has pain in the right hip and right shoulder and upper arm.  He was active at [employer] but had to stop it as the right hip cannot bear weight.  He is also having pain in the right shoulder.      Allergies   Allergen Reactions    Pseudoephedrine Anaphylaxis    Lisinopril Cough        Social History     Socioeconomic History    Marital status:    Tobacco Use    Smoking status: Former     Current packs/day: 0.00     Average packs/day: 1.5 packs/day for 55.0 years (82.5 ttl pk-yrs)     Types: Cigarettes     Start date: 1958     Quit date: 2013     Years since quittin.4     Passive exposure: Past    Smokeless tobacco: Never    Tobacco comments:     enjoyment, then addiction, lastly distaste   Vaping Use    Vaping status: Never Used   Substance and Sexual Activity    Alcohol use: Not Currently    Drug use: Never    Sexual activity: Not Currently        I reviewed the patient's chief complaint, history of present illness, review of systems, past medical history, surgical history, family history, social history, medications, and allergy list.     Review of Systems     Constitutional: Denies fevers, chills, weight loss  Cardiovascular: Denies chest pain, shortness of breath  Skin: Denies rashes, acute skin changes  Neurologic: Denies headache, loss of consciousness        Vital Signs:   Pulse 104   Ht 177.8 cm (70\")   Wt 99.8 kg (220 lb)   SpO2 92%   BMI 31.57 kg/m²          Physical Exam  General: Alert. No acute distress    Ortho Exam        Bilateral hips: Skin is warm, dry, and intact.  Well-healed surgical scar noted " to the right hip.  No tenderness to palpation of greater trochanters.  No pain with passive internal or external rotation of the hips.  Good strength to hip flexors, extensors, abductors, and adductors.  Full flexion and extension of the knees.  Full plantarflexion dorsiflexion of the ankles.  Sensation and distal neurovascular intact.  Smooth sit to stand transition.  Patient fully weightbearing with nonantalgic gait.       Procedures      Imaging Results (Most Recent)       Procedure Component Value Units Date/Time    XR Hips Bilateral With or Without Pelvis 3-4 View [[id number]] Resulted: 10/03/24 0941     Updated: 10/03/24 0947             Result Review :     X-Ray Report:  Bilateral  hip X-Ray  Indication: Evaluation of bilateral hips  AP/Lateral view(s)  Findings: Intact right total hip arthroplasty No signs of loosening, subsidence or periprosthetic fracture. Mild to moderate arthritis of the left hip.   Prior studies available for comparison: yes             Assessment and Plan     Diagnoses and all orders for this visit:    1. Bilateral hip pain (Primary)  -     XR Hips Bilateral With or Without Pelvis 3-4 View        Discussed the treatment plan with the patient. I reviewed the X-rays that were obtained today with the patient.     HEP exercises.        Call or return if worsening symptoms.    Follow Up     4-6 weeks to assess if pain has decreased.        Patient was given instructions and counseling regarding his condition or for health maintenance advice. Please see specific information pulled into the AVS if appropriate.     Scribed for Bhavesh Anderson MD by Monique Tamez MA.  10/03/24   09:39 EDT    I have personally performed the services described in this document as scribed by the above individual and it is both accurate and complete. Bhavesh Anderson MD 10/03/24

## 2024-12-05 ENCOUNTER — OFFICE VISIT (OUTPATIENT)
Dept: UROLOGY | Age: 79
End: 2024-12-05
Payer: COMMERCIAL

## 2024-12-05 VITALS
WEIGHT: 220 LBS | DIASTOLIC BLOOD PRESSURE: 76 MMHG | SYSTOLIC BLOOD PRESSURE: 135 MMHG | BODY MASS INDEX: 31.5 KG/M2 | HEART RATE: 85 BPM | OXYGEN SATURATION: 95 % | HEIGHT: 70 IN

## 2024-12-05 DIAGNOSIS — N40.1 BPH WITH OBSTRUCTION/LOWER URINARY TRACT SYMPTOMS: ICD-10-CM

## 2024-12-05 DIAGNOSIS — N13.8 BPH WITH OBSTRUCTION/LOWER URINARY TRACT SYMPTOMS: ICD-10-CM

## 2024-12-05 DIAGNOSIS — N32.81 OVERACTIVE BLADDER: ICD-10-CM

## 2024-12-05 DIAGNOSIS — R35.0 URINARY FREQUENCY: Primary | ICD-10-CM

## 2024-12-05 LAB
BILIRUB BLD-MCNC: NEGATIVE MG/DL
CLARITY, POC: CLEAR
COLOR UR: NORMAL
EXPIRATION DATE: NORMAL
GLUCOSE UR STRIP-MCNC: NEGATIVE MG/DL
KETONES UR QL: NEGATIVE
LEUKOCYTE EST, POC: NEGATIVE
Lab: NORMAL
NITRITE UR-MCNC: NEGATIVE MG/ML
PH UR: 6 [PH] (ref 5–8)
PROT UR STRIP-MCNC: NEGATIVE MG/DL
RBC # UR STRIP: NEGATIVE /UL
SP GR UR: 1.02 (ref 1–1.03)
URINE VOLUME: NORMAL
UROBILINOGEN UR QL: NORMAL

## 2024-12-05 RX ORDER — VIBEGRON 75 MG/1
75 TABLET, FILM COATED ORAL DAILY
Qty: 90 TABLET | Refills: 3 | Status: SHIPPED | OUTPATIENT
Start: 2024-12-05

## 2024-12-05 RX ORDER — VIBEGRON 75 MG/1
75 TABLET, FILM COATED ORAL DAILY
Qty: 90 TABLET | Refills: 3 | Status: SHIPPED | OUTPATIENT
Start: 2024-12-05 | End: 2024-12-05 | Stop reason: SDUPTHER

## 2024-12-05 NOTE — PROGRESS NOTES
Chief Complaint: Follow-up (Patient presents today for a follow up on his Urinary frequency, urgency, OAB, and his BPH. He states that the Gemtesa has really helped his OAB symptoms. He denies any urinary symptoms today and has no concerns or questions at this moment. )    Subjective         History of Present Illness  Deacon Neil is a 79 y.o. male presents to [facility] to be seen for OAB and BPH.    Nocturia x 0    Stream good.     Denies postvoid dribble.     Urgency/ frequency remains well controlled on gemtesa.    PVR today is 173.      Previous:     Patient previously established with Dr. Zendejas.    Patient is on tamsulosin 0.4 mg daily and Gemtesa 75 mg daily however was referred here to us for OAB symptoms refractory to current modalities.    He has been on the gemtesa for about 6 months.    Patient's PVR today is 248. No documented baseline.     Nocturia x 0    Stream good.     Denies postvoid dribble.     Urgency/ frequency well controlled on gemtesa.    Denies family hx of  malignancies. Personal hx of fibroepithelial bladder tumor in 2009.        Objective     Past Medical History:   Diagnosis Date    Allergic 2014    Anesthesia complication     heart rate dropped     Arthritis     hips    Back pain     Bladder tumor     MYOFIBROBLASTIC TUMOR    BPH (benign prostatic hyperplasia)     Coronary artery disease involving native heart without angina pectoris 06/21/2013     Coronary artery disease. Cardiac catheterization on 06/21/2013 showed 80-85% stenosis of the mid LAD, 70-75% lesion of the diagonal 1 branch, and 40% stenosis of the proximal left circumflex artery. Medical management was advised at that point;     Hyperlipidemia     Hyperlipidemia     Hypertension, essential     Psoriasis     Urinary incontinence 2009    uti    Urinary tract infection 2009       Past Surgical History:   Procedure Laterality Date    BLADDER SURGERY  2009    benign tumor    CARDIAC  CATHETERIZATION  6/2013    CATARACT EXTRACTION      CIRCUMCISION      COLONOSCOPY N/A 02/15/2022    Procedure: COLONOSCOPY;  Surgeon: Roni Pedraza MD;  Location: [facility] ENDOSCOPY;  Service: Gastroenterology;  Laterality: N/A;  HEMORRHOIDS  DIVERTICULOSIS    CYSTOSCOPY      JOINT REPLACEMENT  Right hip total replacement    2022    TONSILLECTOMY      TOTAL HIP ARTHROPLASTY Right 03/18/2022    Procedure: TOTAL HIP ARTHROPLASTY ANTERIOR right;  Surgeon: Bhavesh Anderson MD;  Location: [facility] MAIN OR;  Service: Orthopedics;  Laterality: Right;    VASECTOMY           Current Outpatient Medications:     acetaminophen (TYLENOL) 500 MG tablet, Take 1 tablet by mouth Every 6 (Six) Hours As Needed for Mild Pain., Disp: , Rfl:     amoxicillin (AMOXIL) 500 MG capsule, TK FOUR CS PO 1 HOUR B DAPP, Disp: , Rfl:     Aspirin Low Dose 81 MG EC tablet, TAKE 1 TABLET BY MOUTH EVERY DAY, Disp: 90 tablet, Rfl: 3    coenzyme Q10 100 MG capsule, Take 2 capsules by mouth Every Morning. INST PER ANESTHESIA PROTOCOL, Disp: , Rfl:     Cosentyx Sensoready, 300 MG, 150 MG/ML solution auto-injector, 2 mL Every 30 (Thirty) Days. EVERY 4TH WEEK/TAKES FOR PLAQUE PSORIASIS, Disp: , Rfl:     desloratadine (CLARINEX) 5 MG tablet, Take 1 tablet by mouth Every Night for 360 days., Disp: 90 tablet, Rfl: 3    desonide (DESOWEN) 0.05 % cream, APPLY TOPICALLY TO GROIN TWICE DAILY AS NEEDED FOR PSORIASIS, Disp: , Rfl:     econazole nitrate (SPECTAZOLE) 1 % cream, , Disp: , Rfl:     ezetimibe (ZETIA) 10 MG tablet, TAKE 1 TABLET BY MOUTH DAILY, Disp: 90 tablet, Rfl: 3    Gemtesa 75 MG tablet, Take 1 tablet by mouth Daily., Disp: 90 tablet, Rfl: 3    guaiFENesin 100 MG pack, Take 400 mg by mouth Every 4 (Four) Hours As Needed., Disp: , Rfl:     metoprolol succinate XL (TOPROL-XL) 25 MG 24 hr tablet, TAKE 1 TABLET BY MOUTH DAILY, Disp: 90 tablet, Rfl: 3    rosuvastatin (CRESTOR) 10 MG tablet, TAKE 1 TABLET BY MOUTH DAILY, Disp: 90 tablet, Rfl: 3     "tamsulosin (FLOMAX) 0.4 MG capsule 24 hr capsule, Take 1 capsule by mouth Daily for 360 days., Disp: 90 capsule, Rfl: 3    valsartan-hydrochlorothiazide (DIOVAN-HCT) 320-12.5 MG per tablet, TAKE ONE-HALF TABLET BY MOUTH DAILY, Disp: 45 tablet, Rfl: 3    Allergies   Allergen Reactions    Pseudoephedrine Anaphylaxis    Lisinopril Cough        Family History   Problem Relation Age of Onset    Hypertension Mother          in sleep, age 90    Colon cancer Neg Hx     Malig Hyperthermia Neg Hx        Social History     Socioeconomic History    Marital status:    Tobacco Use    Smoking status: Former     Current packs/day: 0.00     Average packs/day: 1.5 packs/day for 55.0 years (82.5 ttl pk-yrs)     Types: Cigarettes     Start date: 1958     Quit date: 2013     Years since quittin.5     Passive exposure: Past    Smokeless tobacco: Never    Tobacco comments:     enjoyment, then addiction, lastly distaste   Vaping Use    Vaping status: Never Used   Substance and Sexual Activity    Alcohol use: Not Currently    Drug use: Never    Sexual activity: Not Currently       Vital Signs:   /76 (BP Location: Left arm, Patient Position: Sitting, Cuff Size: Adult)   Pulse 85   Ht 177.8 cm (70\")   Wt 99.8 kg (220 lb)   SpO2 95%   BMI 31.57 kg/m²      Physical Exam     Result Review :   The following data was reviewed by: ARUN Chester on 2024:  Results for orders placed or performed in visit on 24   POC Urinalysis Dipstick, Automated    Collection Time: 24 11:23 AM    Specimen: Urine   Result Value Ref Range    Color Dark Yellow Yellow, Straw, Dark Yellow, Randa    Clarity, UA Clear Clear    Specific Gravity  1.020 1.005 - 1.030    pH, Urine 6.0 5.0 - 8.0    Leukocytes Negative Negative    Nitrite, UA Negative Negative    Protein, POC Negative Negative mg/dL    Glucose, UA Negative Negative mg/dL    Ketones, UA Negative Negative    Urobilinogen, UA 0.2 E.U./dL Normal, 0.2 " E.U./dL    Bilirubin Negative Negative    Blood, UA Negative Negative    Lot Number [id number]     Expiration Date 10/2,025    Bladder Scan    Collection Time: 12/05/24 11:23 AM   Result Value Ref Range    Urine Volume 173ml       Bladder Scan interpretation 12/05/2024    Estimation of residual urine via BVI 3000 Verathon Bladder Scan  MA/nurse performing: cecilia amato Ma   Residual Urine: 173 ml  Indication: Urinary frequency    BPH with obstruction/lower urinary tract symptoms    Overactive bladder   Position: Supine  Examination: Incremental scanning of the suprapubic area using 2.0 MHz transducer using copious amounts of acoustic gel.   Findings: An anechoic area was demonstrated which represented the bladder, with measurement of residual urine as noted. I inspected this myself. In that the residual urine was stable or insignificant, refer to plan for treatment and plan necessary at this time.            Procedures        Assessment and Plan    Diagnoses and all orders for this visit:    1. Urinary frequency (Primary)  -     POC Urinalysis Dipstick, Automated  -     Cancel: POC Urinalysis Dipstick, Automated  -     Bladder Scan  -     Discontinue: Gemtesa 75 MG tablet; Take 1 tablet by mouth Daily.  Dispense: 90 tablet; Refill: 3  -     Gemtesa 75 MG tablet; Take 1 tablet by mouth Daily.  Dispense: 90 tablet; Refill: 3    2. BPH with obstruction/lower urinary tract symptoms  -     Discontinue: Gemtesa 75 MG tablet; Take 1 tablet by mouth Daily.  Dispense: 90 tablet; Refill: 3  -     Gemtesa 75 MG tablet; Take 1 tablet by mouth Daily.  Dispense: 90 tablet; Refill: 3    3. Overactive bladder  -     Discontinue: Gemtesa 75 MG tablet; Take 1 tablet by mouth Daily.  Dispense: 90 tablet; Refill: 3  -     Gemtesa 75 MG tablet; Take 1 tablet by mouth Daily.  Dispense: 90 tablet; Refill: 3      Patient's lower urinary tract symptoms and OAB symptoms are well-controlled at this time however I am still going to monitor his PVR  closely to ensure he is going to continue to empty his bladder well. This is a common side effect of gemtesa therefore if this continues to worsen we will need to stop this and try another modality.    Discussed that the concern for incomplete emptying is that he will risk upper urinary tract deterioration.    Continue Gemtesa as well as tamsulosin as previously prescribed    I spent 10 minutes caring for Deacon on this date of service. This time includes time spent by me in the following activities:reviewing tests, obtaining and/or reviewing a separately obtained history, performing a medically appropriate examination and/or evaluation , counseling and educating the patient/family/caregiver, ordering medications, tests, or procedures, and documenting information in the medical record  Follow Up   Return in about 6 months (around 6/5/2025) for f/u oab with pvr .  Patient was given instructions and counseling regarding his condition or for health maintenance advice. Please see specific information pulled into the AVS if appropriate.         This document has been electronically signed by ARUN Chester  December 5, 2024 11:48 EST

## 2024-12-18 DIAGNOSIS — N40.1 BPH WITH OBSTRUCTION/LOWER URINARY TRACT SYMPTOMS: ICD-10-CM

## 2024-12-18 DIAGNOSIS — N13.8 BPH WITH OBSTRUCTION/LOWER URINARY TRACT SYMPTOMS: ICD-10-CM

## 2024-12-18 RX ORDER — TAMSULOSIN HYDROCHLORIDE 0.4 MG/1
1 CAPSULE ORAL DAILY
Qty: 90 CAPSULE | Refills: 3 | Status: SHIPPED | OUTPATIENT
Start: 2024-12-18

## 2025-01-03 ENCOUNTER — TELEPHONE (OUTPATIENT)
Dept: FAMILY MEDICINE CLINIC | Facility: CLINIC | Age: 80
End: 2025-01-03

## 2025-01-03 ENCOUNTER — TELEPHONE (OUTPATIENT)
Dept: FAMILY MEDICINE CLINIC | Facility: CLINIC | Age: 80
End: 2025-01-03
Payer: COMMERCIAL

## 2025-01-03 NOTE — TELEPHONE ENCOUNTER
"Relay     \"[facility] ON WIFE'S VOICEMAIL TO SCHEDULE HIS PHYSICAL AND ALSO SENT A MYCHART MESSAGE\"                "

## 2025-01-03 NOTE — TELEPHONE ENCOUNTER
"Name: Deacon Neil \"Tra\"    Relationship: Self    Best Callback Number: [phone]    HUB PROVIDED THE RELAY MESSAGE FROM THE OFFICE   PATIENT SCHEDULED AS REQUESTED    ADDITIONAL INFORMATION:    "

## 2025-01-03 NOTE — TELEPHONE ENCOUNTER
"Caller: Deacon Neil \"Tra\"    Relationship: Self    Best call back number: [phone]    What orders are you requesting (i.e. lab or imaging): LUNG CANCER SCREENING     Additional notes: PATIENT IS CHECKING TO SEE IF HE IS DUE FOR LUNG CANCER SCREENING AND WHEN HE CAN BE SCHEDULED FOR IT.     "

## 2025-03-16 DIAGNOSIS — E78.5 HYPERLIPIDEMIA, UNSPECIFIED HYPERLIPIDEMIA TYPE: Chronic | ICD-10-CM

## 2025-03-17 RX ORDER — ROSUVASTATIN CALCIUM 10 MG/1
10 TABLET, COATED ORAL DAILY
Qty: 90 TABLET | Refills: 3 | Status: SHIPPED | OUTPATIENT
Start: 2025-03-17

## 2025-03-25 ENCOUNTER — OFFICE VISIT (OUTPATIENT)
Dept: ORTHOPEDIC SURGERY | Facility: CLINIC | Age: 80
End: 2025-03-25
Payer: COMMERCIAL

## 2025-03-25 VITALS
HEART RATE: 85 BPM | DIASTOLIC BLOOD PRESSURE: 74 MMHG | SYSTOLIC BLOOD PRESSURE: 165 MMHG | HEIGHT: 70 IN | WEIGHT: 220 LBS | BODY MASS INDEX: 31.5 KG/M2 | OXYGEN SATURATION: 94 %

## 2025-03-25 DIAGNOSIS — M25.552 BILATERAL HIP PAIN: ICD-10-CM

## 2025-03-25 DIAGNOSIS — M25.551 BILATERAL HIP PAIN: ICD-10-CM

## 2025-03-25 DIAGNOSIS — Z96.641 HISTORY OF TOTAL HIP ARTHROPLASTY, RIGHT: Primary | ICD-10-CM

## 2025-03-25 PROCEDURE — 99213 OFFICE O/P EST LOW 20 MIN: CPT

## 2025-03-25 NOTE — PROGRESS NOTES
"Chief Complaint  Follow-up of the Right Hip and Follow-up of the Left Hip    Subjective      Deacon Neil presents to [facility]     History of Present Illness  The patient is here today for following up on his bilateral hips. He has a history of right total hip arthroplasty on 03/18/2022 and a history of left hip pain and osteoarthritis that he has tried to manage conservatively. He was last seen by Dr. Anderson on 10/03/2024 to continue his home exercises.    He reports satisfactory progress with his right hip, experiencing only mild stiffness or soreness upon awakening, which is effectively managed with a 1000 mg dose of acetaminophen. He does not report any recent falls. However, he does experience numbness and tingling sensations on the lateral aspect of his hip. He recalls a fall down a flight of basement stairs several months ago, which he reported during his last visit with Dr. Anderson. He does not report any chronic symptoms or measurable pain, rating his pain level as 0 on a scale of 10. He has temporarily suspended his [employer] membership, where he used to engage in upper and lower body exercises three times a week, due to the aforementioned fall. He is considering resuming physical exercise.    His left hip condition remains stable, with mild to moderate pain levels.    MEDICATIONS  Current: acetaminophen, amoxicillin      Allergies   Allergen Reactions    Pseudoephedrine Anaphylaxis    Lisinopril Cough       Objective     Vital Signs:   Vitals:    03/25/25 0911   BP: 165/74   Pulse: 85   SpO2: 94%   Weight: 99.8 kg (220 lb)   Height: 177.8 cm (70\")     Body mass index is 31.57 kg/m².    I reviewed the patient's chief complaint, history of present illness, review of systems, past medical history, surgical history, family history, social history, medications, and allergy list.     Ortho Exam  Hip   General: Alert. No acute distress.  Gait: Nonantalgic gait.    Bilateral " hip:  No pain with passive hip ROM.  Intact active hip ROM with no pain. Non tender over the thigh or knee distally. Demonstrates intact active ankle dorsiflexion and plantarflexion. Demonstrates intact sensation over dorsal and plantar foot.  Calf soft, nontender. Neurovascular intact distally. Palpable pedal pulses.               Imaging Results (Most Recent)       Procedure Component Value Units Date/Time    XR Hips Bilateral With or Without Pelvis 2 View [[id number]] Resulted: 03/25/25 1123     Updated: 03/25/25 1123    Narrative:      X-Ray Report:  Study: X-rays ordered, taken in the office, and reviewed today  Site: Bilateral hip xray  Indication: Status post right total hip arthroplasty follow-up and left   hip evaluation  View: AP, frog lateral bilateral hip view(s)  Findings: Stable and intact right total hip arthroplasty.  No evidence of   hardware complication or loosening.  No periprosthetic fractures are   visualized.  Moderately advanced left hip osteoarthritis. No acute   fractures  Prior studies available for comparison: yes                     Assessment and Plan   Diagnoses and all orders for this visit:    1. History of total hip arthroplasty, right (Primary)  Comments:  Dr. Anderson on 3/18/2022    2. Bilateral hip pain  -     XR Hips Bilateral With or Without Pelvis 2 View       Assessment & Plan  1. Post-operative status following right total hip arthroplasty.  The right hip is demonstrating excellent recovery with robust bone formation around the implant, providing stability. The stiffness experienced is considered normal given the hardware in place. He was advised to maintain an active lifestyle and avoid prolonged periods of immobility to prevent stiffness. He was also encouraged to resume physical exercise, including weight resistance training, to support bone health. The use of prophylactic antibiotics prior to dental procedures, as recommended by Dr. Anderson, was discussed. He will continue  taking amoxicillin, 4 pills one hour before dental procedures. Should he experience a fall impacting the right hip, radiographic imaging will be obtained.    2. Left hip pain and osteoarthritis.  The left hip condition has slightly progressed since the last evaluation, with x-rays indicating moderately advanced to severe osteoarthritis. Despite this, he reports no significant pain or discomfort. The presence of a cyst in the femoral head, likely secondary to arthritis, was noted. He was advised to continue monitoring the condition. If the left hip begins to cause significant discomfort, a steroid injection into the hip joint may be considered as a conservative measure before contemplating surgical intervention.    PROCEDURE  The patient underwent right total hip arthroplasty on 03/18/2022.      Follow up as needed        Tobacco Use: Medium Risk (3/25/2025)    Patient History     Smoking Tobacco Use: Former     Smokeless Tobacco Use: Never     Passive Exposure: Past     Patient reports they have a history of tobacco use; encouraged continued tobacco cessation for further health benefits.            Follow Up   No follow-ups on file.  There are no Patient Instructions on file for this visit.    Patient was given instructions and counseling regarding his condition or for health maintenance advice. Please see specific information pulled into the AVS if appropriate.     Patient or patient representative verbalized consent for the use of Ambient Listening during the visit with  ARUN Babin for chart documentation. 3/25/2025  12:22 EDT    Dictated Utilizing Dragon Dictation. Please note that portions of this note were completed with a voice recognition program. Part of this note may be an electronic transcription/translation of spoken language to printed text using the Dragon Dictation System.

## 2025-06-05 ENCOUNTER — OFFICE VISIT (OUTPATIENT)
Dept: UROLOGY | Age: 80
End: 2025-06-05
Payer: COMMERCIAL

## 2025-06-05 VITALS — RESPIRATION RATE: 14 BRPM | HEIGHT: 70 IN | WEIGHT: 220 LBS | BODY MASS INDEX: 31.5 KG/M2

## 2025-06-05 DIAGNOSIS — N13.8 BPH WITH OBSTRUCTION/LOWER URINARY TRACT SYMPTOMS: ICD-10-CM

## 2025-06-05 DIAGNOSIS — N40.1 BPH WITH OBSTRUCTION/LOWER URINARY TRACT SYMPTOMS: ICD-10-CM

## 2025-06-05 DIAGNOSIS — R35.0 URINARY FREQUENCY: Primary | ICD-10-CM

## 2025-06-05 PROBLEM — K62.5 RECTAL BLEEDING: Status: RESOLVED | Noted: 2022-02-10 | Resolved: 2025-06-05

## 2025-06-05 PROBLEM — D49.4 BLADDER TUMOR: Status: RESOLVED | Noted: 2021-07-19 | Resolved: 2025-06-05

## 2025-06-05 LAB
BILIRUB BLD-MCNC: NEGATIVE MG/DL
CLARITY, POC: CLEAR
COLOR UR: YELLOW
EXPIRATION DATE: ABNORMAL
GLUCOSE UR STRIP-MCNC: NEGATIVE MG/DL
KETONES UR QL: ABNORMAL
LEUKOCYTE EST, POC: NEGATIVE
Lab: ABNORMAL
NITRITE UR-MCNC: NEGATIVE MG/ML
PH UR: 5 [PH] (ref 5–8)
PROT UR STRIP-MCNC: ABNORMAL MG/DL
RBC # UR STRIP: NEGATIVE /UL
SP GR UR: 1.03 (ref 1–1.03)
URINE VOLUME: 0
UROBILINOGEN UR QL: ABNORMAL

## 2025-06-05 NOTE — PROGRESS NOTES
Chief Complaint: Urinary Frequency and Benign Prostatic Hypertrophy    Subjective         Urinary Frequency  Associated symptoms: frequency    Benign Prostatic Hypertrophy  Irritative symptoms include frequency.     Deacon Neil is a 80 y.o. male presents to [facility] to be seen for f/u OAB and BPH.    At last visit we continued Gemtesa and tamsulosin as previously prescribed.    He is here today to monitor his PVR as his postvoid residuals have been greater than 150 at last several visits.    PVR in office today is 0.    Nocturia x 0    Good stream.    No straining some hesitancy at times.    Denies postvoid dribble.    Urinary urgency and frequency remains well-controlled on Gemtesa.      Previous:    Nocturia x 0    Stream good.     Denies postvoid dribble.     Urgency/ frequency remains well controlled on gemtesa.    PVR today is 173.      Previous:     Patient previously established with Dr. Zendejas.    Patient is on tamsulosin 0.4 mg daily and Gemtesa 75 mg daily however was referred here to us for OAB symptoms refractory to current modalities.    He has been on the gemtesa for about 6 months.    Patient's PVR today is 248. No documented baseline.     Nocturia x 0    Stream good.     Denies postvoid dribble.     Urgency/ frequency well controlled on gemtesa.    Denies family hx of  malignancies. Personal hx of fibroepithelial bladder tumor in 2009.        Objective     Past Medical History:   Diagnosis Date    Allergic 2014    Anesthesia complication     heart rate dropped     Arthritis     hips    Back pain     Bladder tumor     MYOFIBROBLASTIC TUMOR    BPH (benign prostatic hyperplasia)     Coronary artery disease involving native heart without angina pectoris 06/21/2013     Coronary artery disease. Cardiac catheterization on 06/21/2013 showed 80-85% stenosis of the mid LAD, 70-75% lesion of the diagonal 1 branch, and 40% stenosis of the proximal left circumflex artery.  Medical management was advised at that point;     Hyperlipidemia     Hyperlipidemia     Hypertension, essential     Psoriasis     Urinary incontinence 2009    uti    Urinary tract infection 2009       Past Surgical History:   Procedure Laterality Date    BLADDER SURGERY  2009    benign tumor    CARDIAC CATHETERIZATION  6/2013    CATARACT EXTRACTION      CIRCUMCISION      COLONOSCOPY N/A 02/15/2022    Procedure: COLONOSCOPY;  Surgeon: Roni Pedraza MD;  Location: [facility] END[facility];  Service: Gastroenterology;  Laterality: N/A;  HEMORRHOIDS  DIVERTICULOSIS    CYSTOSCOPY      JOINT REPLACEMENT  Right hip total replacement    2022    TONSILLECTOMY      TOTAL HIP ARTHROPLASTY Right 03/18/2022    Procedure: TOTAL HIP ARTHROPLASTY ANTERIOR right;  Surgeon: Bhavesh Anderson MD;  Location: [facility] MA[facility];  Service: Orthopedics;  Laterality: Right;    VASECTOMY           Current Outpatient Medications:     acetaminophen (TYLENOL) 500 MG tablet, Take 1 tablet by mouth Every 6 (Six) Hours As Needed for Mild Pain., Disp: , Rfl:     Aspirin Low Dose 81 MG EC tablet, TAKE 1 TABLET BY MOUTH EVERY DAY, Disp: 90 tablet, Rfl: 3    coenzyme Q10 100 MG capsule, Take 2 capsules by mouth Every Morning. INST PER ANESTHESIA PROTOCOL, Disp: , Rfl:     Cosentyx Sensoready, 300 MG, 150 MG/ML solution auto-injector, 2 mL Every 30 (Thirty) Days. EVERY 4TH WEEK/TAKES FOR PLAQUE PSORIASIS, Disp: , Rfl:     desloratadine (CLARINEX) 5 MG tablet, Take 1 tablet by mouth Every Night for 360 days., Disp: 90 tablet, Rfl: 3    desonide (DESOWEN) 0.05 % cream, APPLY TOPICALLY TO GROIN TWICE DAILY AS NEEDED FOR PSORIASIS, Disp: , Rfl:     econazole nitrate (SPECTAZOLE) 1 % cream, , Disp: , Rfl:     ezetimibe (ZETIA) 10 MG tablet, TAKE 1 TABLET BY MOUTH DAILY, Disp: 90 tablet, Rfl: 3    Gemtesa 75 MG tablet, Take 1 tablet by mouth Daily., Disp: 90 tablet, Rfl: 3    guaiFENesin 100 MG pack, Take 400 mg by mouth Every 4 (Four) Hours As Needed., Disp: ,  "Rfl:     metoprolol succinate XL (TOPROL-XL) 25 MG 24 hr tablet, TAKE 1 TABLET BY MOUTH DAILY, Disp: 90 tablet, Rfl: 3    rosuvastatin (CRESTOR) 10 MG tablet, TAKE 1 TABLET BY MOUTH DAILY, Disp: 90 tablet, Rfl: 3    tamsulosin (FLOMAX) 0.4 MG capsule 24 hr capsule, TAKE 1 CAPSULE BY MOUTH DAILY, Disp: 90 capsule, Rfl: 3    valsartan-hydrochlorothiazide (DIOVAN-HCT) 320-12.5 MG per tablet, TAKE ONE-HALF TABLET BY MOUTH DAILY, Disp: 45 tablet, Rfl: 3    Allergies   Allergen Reactions    Pseudoephedrine Anaphylaxis    Lisinopril Cough        Family History   Problem Relation Age of Onset    Hypertension Mother          in sleep, age 90    Colon cancer Neg Hx     Malig Hyperthermia Neg Hx        Social History     Socioeconomic History    Marital status:    Tobacco Use    Smoking status: Former     Current packs/day: 0.00     Average packs/day: 1.5 packs/day for 55.0 years (82.5 ttl pk-yrs)     Types: Cigarettes     Start date: 1958     Quit date: 2013     Years since quittin.0     Passive exposure: Past    Smokeless tobacco: Never    Tobacco comments:     enjoyment, then addiction, lastly distaste   Vaping Use    Vaping status: Never Used   Substance and Sexual Activity    Alcohol use: Not Currently    Drug use: Never    Sexual activity: Not Currently       Vital Signs:   Resp 14   Ht 177.8 cm (70\")   Wt 99.8 kg (220 lb)   BMI 31.57 kg/m²      Physical Exam     Result Review :   The following data was reviewed by: ARUN Chester on 2025:  Results for orders placed or performed in visit on 25   Bladder Scan    Collection Time: 25 11:33 AM   Result Value Ref Range    Urine Volume 0    POC Urinalysis Dipstick, Automated    Collection Time: 25 11:40 AM    Specimen: Urine   Result Value Ref Range    Color Yellow Yellow, Straw, Dark Yellow, Randa    Clarity, UA Clear Clear    Specific Gravity  1.030 1.005 - 1.030    pH, Urine 5.0 5.0 - 8.0    Leukocytes Negative " Negative    Nitrite, UA Negative Negative    Protein, POC 30 mg/dL (A) Negative mg/dL    Glucose, UA Negative Negative mg/dL    Ketones, UA Trace (A) Negative    Urobilinogen, UA 0.2 E.U./dL Normal, 0.2 E.U./dL    Bilirubin Negative Negative    Blood, UA Negative Negative    Lot Number [id number]     Expiration Date 9/2,025       Bladder Scan interpretation 06/05/2025    Estimation of residual urine via BVI 3000 Verathon Bladder Scan  MA/nurse performing: cecilia amato Ma   Residual Urine: 0 ml  Indication: Urinary frequency    BPH with obstruction/lower urinary tract symptoms   Position: Supine  Examination: Incremental scanning of the suprapubic area using 2.0 MHz transducer using copious amounts of acoustic gel.   Findings: An anechoic area was demonstrated which represented the bladder, with measurement of residual urine as noted. I inspected this myself. In that the residual urine was stable or insignificant, refer to plan for treatment and plan necessary at this time.            Procedures        Assessment and Plan    Diagnoses and all orders for this visit:    1. Urinary frequency (Primary)  -     POC Urinalysis Dipstick, Automated  -     Bladder Scan    2. BPH with obstruction/lower urinary tract symptoms  -     POC Urinalysis Dipstick, Automated  -     Bladder Scan      He is with better Pvr today.     Symptoms remained controlled.    We will have him continue with gemtesa and tamsulosin.      I spent 10 minutes caring for Deacon on this date of service. This time includes time spent by me in the following activities:reviewing tests, obtaining and/or reviewing a separately obtained history, performing a medically appropriate examination and/or evaluation , counseling and educating the patient/family/caregiver, ordering medications, tests, or procedures, and documenting information in the medical record  Follow Up   Return in about 6 months (around 12/5/2025) for f/u oab/bph with PVR .  Patient was given  instructions and counseling regarding his condition or for health maintenance advice. Please see specific information pulled into the AVS if appropriate.         This document has been electronically signed by ARUN Chester  June 5, 2025 11:45 EDT

## 2025-06-19 ENCOUNTER — OFFICE VISIT (OUTPATIENT)
Dept: ORTHOPEDIC SURGERY | Facility: CLINIC | Age: 80
End: 2025-06-19
Payer: COMMERCIAL

## 2025-06-19 VITALS
HEIGHT: 70 IN | HEART RATE: 77 BPM | BODY MASS INDEX: 31.5 KG/M2 | WEIGHT: 220 LBS | DIASTOLIC BLOOD PRESSURE: 73 MMHG | SYSTOLIC BLOOD PRESSURE: 139 MMHG | OXYGEN SATURATION: 92 %

## 2025-06-19 DIAGNOSIS — M16.12 OSTEOARTHRITIS OF LEFT HIP, UNSPECIFIED OSTEOARTHRITIS TYPE: ICD-10-CM

## 2025-06-19 DIAGNOSIS — Z96.641 HISTORY OF TOTAL HIP ARTHROPLASTY, RIGHT: Primary | ICD-10-CM

## 2025-06-19 NOTE — PROGRESS NOTES
"Chief Complaint  Follow-up of the Right Hip and Follow-up of the Left Hip    Subjective      Deacon Neil presents to [facility]     History of Present Illness  Patient is here today following up on his bilateral hips.  He has a history of a right total hip arthroplasty performed by Dr. Anderson on 03/18/2022 and left hip pain and osteoarthritis, which he has been managing conservatively with home exercises.    Persistent pain in the right hip has not improved since the last visit in 03/2025. There is no pain at rest or during sleep, but discomfort is noted when rising from bed or ascending and descending stairs, requiring the use of both handrails. He describes a sensation of instability in his legs, as if they might give way. Despite this, no falls have occurred within the past year. Pain does not radiate into the groin area. Generalized sensitivity in the area is reported, which cannot be localized to a specific point. Gym exercises have been discontinued due to the lack of improvement in pain. Pain management includes acetaminophen 500 mg every 6 hours as needed, providing relief for approximately one hour after administration.    Low back pain is also reported, and imaging studies are being considered to further investigate this issue.    The left hip remains unchanged since the last visit. Initially mild to moderate, the condition has progressed to a moderate stage, albeit slowly      Allergies   Allergen Reactions    Pseudoephedrine Anaphylaxis    Lisinopril Cough       Objective     Vital Signs:   Vitals:    06/19/25 1021   BP: 139/73   Pulse: 77   SpO2: 92%   Weight: 99.8 kg (220 lb)   Height: 177.8 cm (70\")     Body mass index is 31.57 kg/m².    I reviewed the patient's chief complaint, history of present illness, review of systems, past medical history, surgical history, family history, social history, medications, and allergy list.     Physical " Exam  Musculoskeletal:  Right hip: No pain on passive movement. No pain elicited with external and internal rotation. No pain on palpation. Discomfort with active hip movement, especially forward flexion.             Imaging Results (Most Recent)       None             Results  Imaging   - X-ray of right hip: 06/19/2025, No change, good bone connection and health.   - X-ray of left hip: 06/19/2025, Marginal increase in arthritis, still in the moderate category.         Assessment and Plan   Diagnoses and all orders for this visit:    1. History of total hip arthroplasty, right (Primary)  -     XR Hips Bilateral With or Without Pelvis 2 View; Future    2. Osteoarthritis of left hip, unspecified osteoarthritis type  -     XR Hips Bilateral With or Without Pelvis 2 View; Future         Assessment & Plan  Right hip pain: The patient's symptoms suggest a potential issue with the deep internal hip flexor, tendon, and ligament on the right side. The pain is likely due to irritation when the leg is engaged for lifting. Prolonged walking may have led to compensatory mechanisms, resulting in bursitis or similar conditions due to altered gait. The possibility of the pain originating from the lower back cannot be ruled out. A leg length discrepancy was noted, with the right leg being shorter than the left, which could contribute to the hip and back pain over time. This discrepancy could be addressed through physical therapy or by measuring the legs and providing a lift for the shoe. If the left hip becomes more problematic, a replacement could be considered to equalize leg length. He was advised to continue his current regimen and gradually reintroduce gym activities, focusing on low-impact exercises and weight management. He was also encouraged to maintain mobility through biking and other activities. An x-ray of both hips was ordered for further evaluation. If his condition worsens despite returning to the gym, a spinal  examination will be considered to rule out any contributing factors.    Low back pain: The possibility that the hip pain could be stemming from the low back was discussed. If symptoms persist or worsen, a lower spine MRI may be considered to further investigate the cause of the pain.    Left hip osteoarthritis: The patient has left hip osteoarthritis, which has been managed conservatively with home exercises. An x-ray of both hips was ordered to monitor the progression of the arthritis. He was advised to continue with low-impact exercises and strength-building activities to manage the condition.    Follow-up: The patient will follow up in 6 months if needed. Otherwise, follow up for annual evaluation where xrays are needed          Tobacco Use: Medium Risk (6/19/2025)    Patient History     Smoking Tobacco Use: Former     Smokeless Tobacco Use: Never     Passive Exposure: Past     Patient reports they have a history of tobacco use; encouraged continued tobacco cessation for further health benefits.     BMI is >= 30 and <35. (Class 1 Obesity). The following options were offered after discussion;: Follow up with PCP      Follow Up   Return in about 1 year (around 6/19/2026).  There are no Patient Instructions on file for this visit.    Patient was given instructions and counseling regarding his condition or for health maintenance advice. Please see specific information pulled into the AVS if appropriate.     Patient or patient representative verbalized consent for the use of Ambient Listening during the visit with  ARUN Babin for chart documentation. 6/20/2025  20:00 EDT    Dictated Utilizing Dragon Dictation. Please note that portions of this note were completed with a voice recognition program. Part of this note may be an electronic transcription/translation of spoken language to printed text using the Dragon Dictation System.

## 2025-07-01 RX ORDER — EZETIMIBE 10 MG/1
10 TABLET ORAL DAILY
Qty: 90 TABLET | Refills: 3 | Status: SHIPPED | OUTPATIENT
Start: 2025-07-01

## 2025-07-01 NOTE — TELEPHONE ENCOUNTER
Rx Refill Note  Requested Prescriptions     Pending Prescriptions Disp Refills    ezetimibe (ZETIA) 10 MG tablet [Pharmacy Med Name: EZETIMIBE 10MG TABLETS] 90 tablet 3     Sig: TAKE 1 TABLET BY MOUTH DAILY        LAST OFFICE VISIT:  07/08/2024     NEXT OFFICE VISIT:  07/10/2025     Does the medication requests match the last office note:    [x] Yes   [] No    Does this refill request meet protocol details for MA to approve:     [x] Yes   [] No   [] No Protocols Provided

## 2025-07-10 ENCOUNTER — OFFICE VISIT (OUTPATIENT)
Dept: CARDIOLOGY | Facility: CLINIC | Age: 80
End: 2025-07-10
Payer: COMMERCIAL

## 2025-07-10 VITALS
WEIGHT: 215 LBS | SYSTOLIC BLOOD PRESSURE: 142 MMHG | BODY MASS INDEX: 30.78 KG/M2 | DIASTOLIC BLOOD PRESSURE: 81 MMHG | HEIGHT: 70 IN | HEART RATE: 78 BPM

## 2025-07-10 DIAGNOSIS — I10 HYPERTENSION, ESSENTIAL: Chronic | ICD-10-CM

## 2025-07-10 DIAGNOSIS — I25.10 CORONARY ARTERY DISEASE INVOLVING NATIVE CORONARY ARTERY OF NATIVE HEART WITHOUT ANGINA PECTORIS: Primary | Chronic | ICD-10-CM

## 2025-07-10 DIAGNOSIS — E78.2 MIXED HYPERLIPIDEMIA: Chronic | ICD-10-CM

## 2025-07-10 PROCEDURE — 99214 OFFICE O/P EST MOD 30 MIN: CPT | Performed by: INTERNAL MEDICINE

## 2025-07-10 NOTE — ASSESSMENT & PLAN NOTE
Blood pressure reasonably well-controlled.  Continue valsartan/hydrochlorothiazide and metoprolol at current dose.  Will do CBC and CMP now.

## 2025-07-10 NOTE — ASSESSMENT & PLAN NOTE
LDL previously at goal, no recent lipid panel available for review.  He is unable to tolerate higher doses of rosuvastatin.  Will continue rosuvastatin 10 mg daily with Zetia 10 mg daily.  He would like to cut down on the number of pills, hence stopping coq.10.  Will do CMP and lipid panel now.

## 2025-07-10 NOTE — PROGRESS NOTES
CARDIOLOGY FOLLOW-UP PROGRESS NOTE        Chief Complaint  Follow-up and Coronary Artery Disease    Subjective            Deacon Neil presents to [facility] CARDIOLOG[facility]  History of Present Illness    Mr Neil is here for annual follow-up visit.  He recently had a fall and reports some soreness of the right thigh.  He has not gone to gym for the past few days.  Otherwise feeling fine.  No chest pain, shortness of breath or palpitations.  Continues to be active at baseline.    Past History:    1) Coronary artery disease. Cardiac catheterization on 06/21/2013 showed 80-85% stenosis of the mid LAD, 70-75% lesion of the diagonal 1 branch, and 40% stenosis of the proximal left circumflex artery. Medical management was advised at that point; 2) Hyperlipidemia; 3) Hypertension; 4) Psoriasis     Medical History:  Past Medical History:   Diagnosis Date    Allergic 2014    Anesthesia complication     heart rate dropped     Arthritis     hips    Back pain     Bladder tumor     MYOFIBROBLASTIC TUMOR    BPH (benign prostatic hyperplasia)     Coronary artery disease involving native heart without angina pectoris 06/21/2013     Coronary artery disease. Cardiac catheterization on 06/21/2013 showed 80-85% stenosis of the mid LAD, 70-75% lesion of the diagonal 1 branch, and 40% stenosis of the proximal left circumflex artery. Medical management was advised at that point;     Hyperlipidemia     Hyperlipidemia     Hypertension, essential     Psoriasis     Urinary incontinence 2009    uti    Urinary tract infection 2009       Family History: family history includes Hypertension in his mother.     Social History: reports that he quit smoking about 12 years ago. His smoking use included cigarettes. He started smoking about 67 years ago. He has a 82.5 pack-year smoking history. He has been exposed to tobacco smoke. He has never used smokeless tobacco. He reports that he does not currently use alcohol. He reports  "that he does not use drugs.    Allergies: Pseudoephedrine and Lisinopril    Current Outpatient Medications on File Prior to Visit   Medication Sig    acetaminophen (TYLENOL) 500 MG tablet Take 1 tablet by mouth Every 6 (Six) Hours As Needed for Mild Pain.    Aspirin Low Dose 81 MG EC tablet TAKE 1 TABLET BY MOUTH EVERY DAY    Cosentyx Sensoready, 300 MG, 150 MG/ML solution auto-injector 2 mL Every 30 (Thirty) Days. EVERY 4TH WEEK/TAKES FOR PLAQUE PSORIASIS    desloratadine (CLARINEX) 5 MG tablet Take 1 tablet by mouth Every Night for 360 days.    desonide (DESOWEN) 0.05 % cream APPLY TOPICALLY TO GROIN TWICE DAILY AS NEEDED FOR PSORIASIS    econazole nitrate (SPECTAZOLE) 1 % cream     ezetimibe (ZETIA) 10 MG tablet TAKE 1 TABLET BY MOUTH DAILY    Gemtesa 75 MG tablet Take 1 tablet by mouth Daily.    guaiFENesin 100 MG pack Take 400 mg by mouth Every 4 (Four) Hours As Needed.    metoprolol succinate XL (TOPROL-XL) 25 MG 24 hr tablet TAKE 1 TABLET BY MOUTH DAILY    rosuvastatin (CRESTOR) 10 MG tablet TAKE 1 TABLET BY MOUTH DAILY    tamsulosin (FLOMAX) 0.4 MG capsule 24 hr capsule TAKE 1 CAPSULE BY MOUTH DAILY    valsartan-hydrochlorothiazide (DIOVAN-HCT) 320-12.5 MG per tablet TAKE ONE-HALF TABLET BY MOUTH DAILY    [DISCONTINUED] coenzyme Q10 100 MG capsule Take 2 capsules by mouth Every Morning. INST PER ANESTHESIA PROTOCOL     No current facility-administered medications on file prior to visit.          Review of Systems   Respiratory:  Negative for cough, shortness of breath and wheezing.    Cardiovascular:  Negative for chest pain, palpitations and leg swelling.   Gastrointestinal:  Negative for nausea and vomiting.   Neurological:  Negative for dizziness and syncope.        Objective     /81   Pulse 78   Ht 177.8 cm (70\")   Wt 97.5 kg (215 lb)   BMI 30.85 kg/m²       Physical Exam    General : Alert, awake, no acute distress  Neck : Supple, no carotid bruit, no jugular venous distention  CVS : Regular " rate and rhythm, no murmur, rubs or gallops  Lungs: Clear to auscultation bilaterally, no crackles or rhonchi  Abdomen: Soft, nontender, bowel sounds heard in all 4 quadrants  Extremities: Warm, well-perfused, no pedal edema    Result Review :     The following data was reviewed by: Randell Albert MD on 07/10/2025:    No recent labs available for review.                 Assessment and Plan        Diagnoses and all orders for this visit:    1. Coronary artery disease involving native coronary artery of native heart without angina pectoris (Primary)  Assessment & Plan:  He is currently chest pain-free.  Will continue aspirin, statins and beta-blockers.    Orders:  -     CBC (No Diff); Future  -     Hemoglobin A1c; Future    2. Hypertension, essential  Assessment & Plan:  Blood pressure reasonably well-controlled.  Continue valsartan/hydrochlorothiazide and metoprolol at current dose.  Will do CBC and CMP now.    Orders:  -     Comprehensive Metabolic Panel; Future  -     Lipid Panel; Future    3. Mixed hyperlipidemia  Assessment & Plan:  LDL previously at goal, no recent lipid panel available for review.  He is unable to tolerate higher doses of rosuvastatin.  Will continue rosuvastatin 10 mg daily with Zetia 10 mg daily.  He would like to cut down on the number of pills, hence stopping coq.10.  Will do CMP and lipid panel now.                Follow Up     Return in about 1 year (around 7/10/2026) for Next scheduled follow up.    Patient was given instructions and counseling regarding his condition or for health maintenance advice. Please see specific information pulled into the AVS if appropriate.

## 2025-07-14 DIAGNOSIS — I10 HYPERTENSION, ESSENTIAL: Chronic | ICD-10-CM

## 2025-07-15 ENCOUNTER — LAB (OUTPATIENT)
Facility: HOSPITAL | Age: 80
End: 2025-07-15
Payer: COMMERCIAL

## 2025-07-15 DIAGNOSIS — I10 HYPERTENSION, ESSENTIAL: Chronic | ICD-10-CM

## 2025-07-15 DIAGNOSIS — I25.10 CORONARY ARTERY DISEASE INVOLVING NATIVE CORONARY ARTERY OF NATIVE HEART WITHOUT ANGINA PECTORIS: Chronic | ICD-10-CM

## 2025-07-15 LAB
ALBUMIN SERPL-MCNC: 4.4 G/DL (ref 3.5–5.2)
ALBUMIN/GLOB SERPL: 1.4 G/DL
ALP SERPL-CCNC: 83 U/L (ref 39–117)
ALT SERPL W P-5'-P-CCNC: 18 U/L (ref 1–41)
ANION GAP SERPL CALCULATED.3IONS-SCNC: 10 MMOL/L (ref 5–15)
AST SERPL-CCNC: 19 U/L (ref 1–40)
BILIRUB SERPL-MCNC: 0.5 MG/DL (ref 0–1.2)
BUN SERPL-MCNC: 16 MG/DL (ref 8–23)
BUN/CREAT SERPL: 11.6 (ref 7–25)
CALCIUM SPEC-SCNC: 9.2 MG/DL (ref 8.6–10.5)
CHLORIDE SERPL-SCNC: 106 MMOL/L (ref 98–107)
CHOLEST SERPL-MCNC: 103 MG/DL (ref 0–200)
CO2 SERPL-SCNC: 26 MMOL/L (ref 22–29)
CREAT SERPL-MCNC: 1.38 MG/DL (ref 0.76–1.27)
DEPRECATED RDW RBC AUTO: 42.7 FL (ref 37–54)
EGFRCR SERPLBLD CKD-EPI 2021: 51.4 ML/MIN/1.73
ERYTHROCYTE [DISTWIDTH] IN BLOOD BY AUTOMATED COUNT: 13.1 % (ref 12.3–15.4)
GLOBULIN UR ELPH-MCNC: 3.1 GM/DL
GLUCOSE SERPL-MCNC: 97 MG/DL (ref 65–99)
HBA1C MFR BLD: 5.7 % (ref 4.8–5.6)
HCT VFR BLD AUTO: 42.5 % (ref 37.5–51)
HDLC SERPL-MCNC: 29 MG/DL (ref 40–60)
HGB BLD-MCNC: 14.2 G/DL (ref 13–17.7)
LDLC SERPL CALC-MCNC: 56 MG/DL (ref 0–100)
LDLC/HDLC SERPL: 1.9 {RATIO}
MCH RBC QN AUTO: 29.7 PG (ref 26.6–33)
MCHC RBC AUTO-ENTMCNC: 33.4 G/DL (ref 31.5–35.7)
MCV RBC AUTO: 88.9 FL (ref 79–97)
PLATELET # BLD AUTO: 245 10*3/MM3 (ref 140–450)
PMV BLD AUTO: 11.1 FL (ref 6–12)
POTASSIUM SERPL-SCNC: 4.3 MMOL/L (ref 3.5–5.2)
PROT SERPL-MCNC: 7.5 G/DL (ref 6–8.5)
RBC # BLD AUTO: 4.78 10*6/MM3 (ref 4.14–5.8)
SODIUM SERPL-SCNC: 142 MMOL/L (ref 136–145)
TRIGL SERPL-MCNC: 95 MG/DL (ref 0–150)
VLDLC SERPL-MCNC: 18 MG/DL (ref 5–40)
WBC NRBC COR # BLD AUTO: 7.75 10*3/MM3 (ref 3.4–10.8)

## 2025-07-15 PROCEDURE — 80053 COMPREHEN METABOLIC PANEL: CPT

## 2025-07-15 PROCEDURE — 36415 COLL VENOUS BLD VENIPUNCTURE: CPT

## 2025-07-15 PROCEDURE — 83036 HEMOGLOBIN GLYCOSYLATED A1C: CPT

## 2025-07-15 PROCEDURE — 85027 COMPLETE CBC AUTOMATED: CPT

## 2025-07-15 PROCEDURE — 80061 LIPID PANEL: CPT

## 2025-07-18 ENCOUNTER — RESULTS FOLLOW-UP (OUTPATIENT)
Dept: CARDIOLOGY | Facility: CLINIC | Age: 80
End: 2025-07-18
Payer: COMMERCIAL

## 2025-07-18 DIAGNOSIS — I10 HYPERTENSION, ESSENTIAL: Primary | ICD-10-CM

## 2025-07-18 RX ORDER — VALSARTAN AND HYDROCHLOROTHIAZIDE 320; 12.5 MG/1; MG/1
0.5 TABLET, FILM COATED ORAL DAILY
Qty: 45 TABLET | Refills: 3 | Status: SHIPPED | OUTPATIENT
Start: 2025-07-18

## 2025-07-18 NOTE — PROGRESS NOTES
Labs done recently reviewed.  Cholesterol levels are well-controlled and at goal.  Blood counts are normal.  Liver enzymes, sodium and potassium are normal.  Creatinine, marker of kidney function is higher than previous levels.  There is mild kidney dysfunction.    We recommend to continue all the current medications.  Increase fluid intake.  BMP to be repeated in 3 months (Dx : HTN) .       Electronically signed by Randell Albert MD, 07/18/25, 2:20 PM EDT.

## 2025-07-18 NOTE — TELEPHONE ENCOUNTER
STORMY patient. Went over results and recommendations. Patient verbalized understanding and appreciation. Lab order has been placed.

## 2025-07-28 DIAGNOSIS — I10 HYPERTENSION, ESSENTIAL: Chronic | ICD-10-CM

## 2025-07-28 RX ORDER — METOPROLOL SUCCINATE 25 MG/1
25 TABLET, EXTENDED RELEASE ORAL DAILY
Qty: 90 TABLET | Refills: 3 | OUTPATIENT
Start: 2025-07-28

## 2025-08-08 ENCOUNTER — TRANSCRIBE ORDERS (OUTPATIENT)
Dept: ADMINISTRATIVE | Facility: HOSPITAL | Age: 80
End: 2025-08-08
Payer: COMMERCIAL

## 2025-08-08 ENCOUNTER — LAB (OUTPATIENT)
Facility: HOSPITAL | Age: 80
End: 2025-08-08
Payer: COMMERCIAL

## 2025-08-08 DIAGNOSIS — L40.0 PSORIASIS VULGARIS: Primary | ICD-10-CM

## 2025-08-08 DIAGNOSIS — L40.0 PSORIASIS VULGARIS: ICD-10-CM

## 2025-08-08 PROCEDURE — 36415 COLL VENOUS BLD VENIPUNCTURE: CPT

## 2025-08-08 PROCEDURE — 86480 TB TEST CELL IMMUN MEASURE: CPT

## 2025-08-12 LAB
GAMMA INTERFERON BACKGROUND BLD IA-ACNC: 0.04 IU/ML
M TB IFN-G BLD-IMP: NEGATIVE
M TB IFN-G CD4+ BCKGRND COR BLD-ACNC: 0.05 IU/ML
M TB IFN-G CD4+CD8+ BCKGRND COR BLD-ACNC: 0.09 IU/ML
MITOGEN IGNF BCKGRD COR BLD-ACNC: >10 IU/ML
QUANTIFERON INCUBATION: NORMAL
SERVICE CMNT-IMP: NORMAL

## (undated) DEVICE — 450 ML BOTTLE OF 0.05% CHLORHEXIDINE GLUCONATE IN 99.95% STERILE WATER FOR IRRIGATION, USP AND APPLICATOR.: Brand: IRRISEPT ANTIMICROBIAL WOUND LAVAGE

## (undated) DEVICE — LIGHT SLEEVE: Brand: DEVON

## (undated) DEVICE — TOWEL,OR,DSP,ST,BLUE,STD,4/PK,20PK/CS: Brand: MEDLINE

## (undated) DEVICE — SLV SCD KN/LEN ADJ EXPRSS BLENDED MD 1P/U

## (undated) DEVICE — PENCL E/S SMOKEEVAC W/TELESCP CANN

## (undated) DEVICE — GAUZE,SPONGE,4"X4",16PLY,STRL,LF,10/TRAY: Brand: MEDLINE

## (undated) DEVICE — CVR LEG BOOTLEG F/R NOSKID 33IN

## (undated) DEVICE — GLV SURG SENSICARE SLT PF LF 6.5 STRL

## (undated) DEVICE — KT PT POSITION SUPINE HANA/PROFX TABL

## (undated) DEVICE — GLV SURG SENSICARE SLT PF LF 8.5 STRL

## (undated) DEVICE — Device

## (undated) DEVICE — COLON KIT: Brand: MEDLINE INDUSTRIES, INC.

## (undated) DEVICE — PEEL-AWAY TOGA, 2X LARGE: Brand: FLYTE

## (undated) DEVICE — GLV SURG BIOGEL LTX PF 8 1/2

## (undated) DEVICE — SUT VIC 2/0 CT1 36IN

## (undated) DEVICE — BIPOLAR SEALER 23-112-1 AQM 6.0: Brand: AQUAMANTYS™

## (undated) DEVICE — Device: Brand: DEFENDO AIR/WATER/SUCTION AND BIOPSY VALVE

## (undated) DEVICE — MEDI-VAC NON-CONDUCTIVE SUCTION TUBING: Brand: CARDINAL HEALTH

## (undated) DEVICE — SOL IRRG H2O PL/BG 1000ML STRL

## (undated) DEVICE — STRYKER PERFORMANCE SERIES SAGITTAL BLADE: Brand: STRYKER PERFORMANCE SERIES

## (undated) DEVICE — APPL CHLORAPREP HI/LITE 26ML ORNG

## (undated) DEVICE — ANTIBACTERIAL VIOLET BRAIDED (POLYGLACTIN 910), SYNTHETIC ABSORBABLE SURGICAL SUTURE: Brand: COATED VICRYL

## (undated) DEVICE — PROXIMATE RH ROTATING HEAD SKIN STAPLERS (35 WIDE) CONTAINS 35 STAINLESS STEEL STAPLES: Brand: PROXIMATE

## (undated) DEVICE — TOTAL ANTERIOR HIP-LF: Brand: MEDLINE INDUSTRIES, INC.

## (undated) DEVICE — GLV SURG SENSICARE SLT PF LF 7 STRL

## (undated) DEVICE — PULLOVER TOGA, 2X LARGE: Brand: FLYTE, SURGICOOL

## (undated) DEVICE — MAT FLR ABS W/BLU/LINER 56X72IN WHT

## (undated) DEVICE — GLV SURG SENSICARE PI PF LF 7 GRN STRL